# Patient Record
Sex: MALE | Race: WHITE | NOT HISPANIC OR LATINO | Employment: FULL TIME | URBAN - METROPOLITAN AREA
[De-identification: names, ages, dates, MRNs, and addresses within clinical notes are randomized per-mention and may not be internally consistent; named-entity substitution may affect disease eponyms.]

---

## 2018-01-26 ENCOUNTER — APPOINTMENT (EMERGENCY)
Dept: RADIOLOGY | Facility: HOSPITAL | Age: 58
End: 2018-01-26
Payer: COMMERCIAL

## 2018-01-26 ENCOUNTER — HOSPITAL ENCOUNTER (OUTPATIENT)
Facility: HOSPITAL | Age: 58
Setting detail: OBSERVATION
Discharge: LEFT BEFORE DISCHARGE | End: 2018-01-27
Attending: EMERGENCY MEDICINE | Admitting: HOSPITALIST
Payer: COMMERCIAL

## 2018-01-26 ENCOUNTER — APPOINTMENT (EMERGENCY)
Dept: CT IMAGING | Facility: HOSPITAL | Age: 58
End: 2018-01-26
Payer: COMMERCIAL

## 2018-01-26 DIAGNOSIS — R55 SYNCOPE: Primary | ICD-10-CM

## 2018-01-26 DIAGNOSIS — R55 SYNCOPE AND COLLAPSE: ICD-10-CM

## 2018-01-26 PROBLEM — Z72.0 TOBACCO ABUSE: Status: ACTIVE | Noted: 2018-01-26

## 2018-01-26 LAB
ALBUMIN SERPL BCP-MCNC: 4.2 G/DL (ref 3.5–5)
ALP SERPL-CCNC: 63 U/L (ref 46–116)
ALT SERPL W P-5'-P-CCNC: 25 U/L (ref 12–78)
ANION GAP SERPL CALCULATED.3IONS-SCNC: 8 MMOL/L (ref 4–13)
APAP SERPL-MCNC: <2 UG/ML (ref 10–30)
APTT PPP: 28 SECONDS (ref 23–35)
AST SERPL W P-5'-P-CCNC: 39 U/L (ref 5–45)
ATRIAL RATE: 88 BPM
BASOPHILS # BLD AUTO: 0.01 THOUSANDS/ΜL (ref 0–0.1)
BASOPHILS NFR BLD AUTO: 0 % (ref 0–1)
BILIRUB SERPL-MCNC: 0.8 MG/DL (ref 0.2–1)
BUN SERPL-MCNC: 14 MG/DL (ref 5–25)
CALCIUM SERPL-MCNC: 8.5 MG/DL (ref 8.3–10.1)
CHLORIDE SERPL-SCNC: 105 MMOL/L (ref 100–108)
CO2 SERPL-SCNC: 29 MMOL/L (ref 21–32)
CREAT SERPL-MCNC: 1 MG/DL (ref 0.6–1.3)
EOSINOPHIL # BLD AUTO: 0.03 THOUSAND/ΜL (ref 0–0.61)
EOSINOPHIL NFR BLD AUTO: 1 % (ref 0–6)
ERYTHROCYTE [DISTWIDTH] IN BLOOD BY AUTOMATED COUNT: 13.7 % (ref 11.6–15.1)
ETHANOL SERPL-MCNC: <3 MG/DL (ref 0–3)
GFR SERPL CREATININE-BSD FRML MDRD: 84 ML/MIN/1.73SQ M
GLUCOSE SERPL-MCNC: 100 MG/DL (ref 65–140)
HCT VFR BLD AUTO: 39.3 % (ref 36.5–49.3)
HGB BLD-MCNC: 14.2 G/DL (ref 12–17)
INR PPP: 1.17 (ref 0.86–1.16)
LYMPHOCYTES # BLD AUTO: 0.69 THOUSANDS/ΜL (ref 0.6–4.47)
LYMPHOCYTES NFR BLD AUTO: 11 % (ref 14–44)
MCH RBC QN AUTO: 31.3 PG (ref 26.8–34.3)
MCHC RBC AUTO-ENTMCNC: 36.1 G/DL (ref 31.4–37.4)
MCV RBC AUTO: 87 FL (ref 82–98)
MONOCYTES # BLD AUTO: 0.57 THOUSAND/ΜL (ref 0.17–1.22)
MONOCYTES NFR BLD AUTO: 9 % (ref 4–12)
NEUTROPHILS # BLD AUTO: 5.11 THOUSANDS/ΜL (ref 1.85–7.62)
NEUTS SEG NFR BLD AUTO: 79 % (ref 43–75)
P AXIS: 70 DEGREES
PLATELET # BLD AUTO: 165 THOUSANDS/UL (ref 149–390)
PMV BLD AUTO: 10.2 FL (ref 8.9–12.7)
POTASSIUM SERPL-SCNC: 3.8 MMOL/L (ref 3.5–5.3)
PR INTERVAL: 182 MS
PROLACTIN SERPL-MCNC: 51.9 NG/ML (ref 2.5–17.4)
PROT SERPL-MCNC: 7.7 G/DL (ref 6.4–8.2)
PROTHROMBIN TIME: 14.7 SECONDS (ref 12.1–14.4)
QRS AXIS: 67 DEGREES
QRSD INTERVAL: 100 MS
QT INTERVAL: 398 MS
QTC INTERVAL: 481 MS
RBC # BLD AUTO: 4.53 MILLION/UL (ref 3.88–5.62)
SALICYLATES SERPL-MCNC: 3.1 MG/DL (ref 3–20)
SODIUM SERPL-SCNC: 142 MMOL/L (ref 136–145)
T WAVE AXIS: 65 DEGREES
TROPONIN I SERPL-MCNC: <0.02 NG/ML
VENTRICULAR RATE: 88 BPM
WBC # BLD AUTO: 6.41 THOUSAND/UL (ref 4.31–10.16)

## 2018-01-26 PROCEDURE — 85025 COMPLETE CBC W/AUTO DIFF WBC: CPT | Performed by: EMERGENCY MEDICINE

## 2018-01-26 PROCEDURE — 84146 ASSAY OF PROLACTIN: CPT | Performed by: EMERGENCY MEDICINE

## 2018-01-26 PROCEDURE — 36415 COLL VENOUS BLD VENIPUNCTURE: CPT | Performed by: EMERGENCY MEDICINE

## 2018-01-26 PROCEDURE — 80053 COMPREHEN METABOLIC PANEL: CPT | Performed by: EMERGENCY MEDICINE

## 2018-01-26 PROCEDURE — 93010 ELECTROCARDIOGRAM REPORT: CPT | Performed by: INTERNAL MEDICINE

## 2018-01-26 PROCEDURE — 85610 PROTHROMBIN TIME: CPT | Performed by: EMERGENCY MEDICINE

## 2018-01-26 PROCEDURE — 85730 THROMBOPLASTIN TIME PARTIAL: CPT | Performed by: EMERGENCY MEDICINE

## 2018-01-26 PROCEDURE — 96360 HYDRATION IV INFUSION INIT: CPT

## 2018-01-26 PROCEDURE — 80320 DRUG SCREEN QUANTALCOHOLS: CPT | Performed by: EMERGENCY MEDICINE

## 2018-01-26 PROCEDURE — 71045 X-RAY EXAM CHEST 1 VIEW: CPT

## 2018-01-26 PROCEDURE — 70450 CT HEAD/BRAIN W/O DYE: CPT

## 2018-01-26 PROCEDURE — 80329 ANALGESICS NON-OPIOID 1 OR 2: CPT | Performed by: EMERGENCY MEDICINE

## 2018-01-26 PROCEDURE — 96361 HYDRATE IV INFUSION ADD-ON: CPT

## 2018-01-26 PROCEDURE — 84484 ASSAY OF TROPONIN QUANT: CPT | Performed by: EMERGENCY MEDICINE

## 2018-01-26 PROCEDURE — 99220 PR INITIAL OBSERVATION CARE/DAY 70 MINUTES: CPT | Performed by: HOSPITALIST

## 2018-01-26 PROCEDURE — 99285 EMERGENCY DEPT VISIT HI MDM: CPT

## 2018-01-26 PROCEDURE — 93005 ELECTROCARDIOGRAM TRACING: CPT

## 2018-01-26 PROCEDURE — 84484 ASSAY OF TROPONIN QUANT: CPT | Performed by: HOSPITALIST

## 2018-01-26 RX ORDER — THIAMINE MONONITRATE (VIT B1) 100 MG
100 TABLET ORAL ONCE
Status: COMPLETED | OUTPATIENT
Start: 2018-01-26 | End: 2018-01-26

## 2018-01-26 RX ORDER — NICOTINE 21 MG/24HR
1 PATCH, TRANSDERMAL 24 HOURS TRANSDERMAL DAILY
Status: DISCONTINUED | OUTPATIENT
Start: 2018-01-27 | End: 2018-01-27 | Stop reason: HOSPADM

## 2018-01-26 RX ORDER — LORAZEPAM 2 MG/ML
1 INJECTION INTRAMUSCULAR ONCE
Status: COMPLETED | OUTPATIENT
Start: 2018-01-26 | End: 2018-01-26

## 2018-01-26 RX ORDER — LORAZEPAM 2 MG/ML
1 INJECTION INTRAMUSCULAR EVERY 4 HOURS PRN
Status: DISCONTINUED | OUTPATIENT
Start: 2018-01-26 | End: 2018-01-27 | Stop reason: HOSPADM

## 2018-01-26 RX ADMIN — SODIUM CHLORIDE 1000 ML: 0.9 INJECTION, SOLUTION INTRAVENOUS at 15:00

## 2018-01-26 RX ADMIN — LORAZEPAM 1 MG: 2 INJECTION INTRAMUSCULAR; INTRAVENOUS at 17:41

## 2018-01-26 RX ADMIN — Medication 100 MG: at 17:38

## 2018-01-26 NOTE — H&P
H&P- Aimee Mcgrath 1960, 65 y o  male MRN: [id number]    Unit/Bed#: [address] [place] [address] Encounter: [id number]    Primary Care Provider: Celeste Patel MD   Date and time admitted to hospital: 1/26/2018  2:56 PM        Tobacco abuse   Assessment & Plan    -nicotine 14 mg patch  -patient counseled for 5 minutes on smoking cessation        * Syncope and collapse   Assessment & Plan    -monitor on telemetry  -trend troponin  -consult Cardiology  -check echocardiogram  -based on the history this does not look like a seizure but I will order an EEG          VTE Prophylaxis: Enoxaparin (Lovenox)  / sequential compression device   Code Status: FULL  POLST: POLST is not applicable to this patient      Anticipated Length of Stay:  Patient will be admitted on an Observation basis with an anticipated length of stay of  < 2 midnights  Justification for Hospital Stay: syncope    Total Time for Visit, including Counseling / Coordination of Care: 30 minutes  Greater than 50% of this total time spent on direct patient counseling and coordination of care  Chief Complaint:   Syncope and collapse    History of Present Illness:    Aimee Mcgrath is a 65 y o  male who presents with a syncopal episode earlier today  The patient was at work  He had no prodrome will symptoms  Patient passed out for approximately 5 minutes  Witnesses noted some twitching but no gross tonic-clonic activity  There was no bowel or bladder incontinence  Patient denies any chest pain or shortness of breath  He denies palpitations  He does note that last night after sleeping he woke up and had bitten his tongue  Patient denies cough, nausea, vomiting, diarrhea, abdominal pain, fevers, chills, night sweats, headache, visual disturbances, neck stiffness, new focal neurologic deficit, rash, dysuria, heat or cold intolerance, significant weight gain or loss        Review of Systems:    Review of Systems   All other systems reviewed and are negative  Past Medical and Surgical History:     Past Medical History:   Diagnosis Date    Cancer [facility])        Past Surgical History:   Procedure Laterality Date    RECTAL BIOPSY      RECTAL SURGERY         Meds/Allergies:    Prior to Admission medications    Not on File     I have reviewed home medications with patient personally  Allergies: No Known Allergies    Social History:     Marital Status:      Substance Use History:   History   Alcohol Use    Yes     Comment: states about 2 dozen between every saturday and sunday     History   Smoking Status    Current Every Day Smoker    Packs/day: 1 50   Smokeless Tobacco    Never Used     History   Drug Use No       Family History:    non-contributory    Physical Exam:     Vitals:   Blood Pressure: 140/76 (01/26/18 1800)  Pulse: 73 (01/26/18 1800)  Temperature: 99 6 °F (37 6 °C) (01/26/18 1505)  Temp Source: Oral (01/26/18 1505)  Respirations: 22 (01/26/18 1800)  Height: 5' 6" (167 6 cm) (01/26/18 1505)  Weight - Scale: 85 7 kg (189 lb) (01/26/18 1505)  SpO2: 100 % (01/26/18 1800)    Physical Exam    Gen: NAD, AAOx3, well developed, well nourished  Eyes: EOMI, PERRLA, no scleral icterus  ENMT:  Oropharynx clear of erythema or exudates, no nasal discharge, no otic discharge, moist mucous membranes  Neck:  Supple  Lymph:  No anterior or posterior cervical or supraclavicular lymphadenopathy  Cardiovascular:  Regular rate and rhythm, normal S1-S2, no murmurs, rubs, or gallops  Lungs:  Clear to auscultation bilaterally, no wheezes, or rales, or rhonchi  Abdomen:  Positive bowel sounds, soft, nontender, nondistended, no palpable organomegaly   Skin:  Intact, no obvious lesions or rashes, no edema  Neuro: Cranial nerves 2-12 are intact, non-focal, 5/5 strength in all 4 extremities      Additional Data:     Lab Results: I have personally reviewed pertinent reports          Results from last 7 days  Lab Units 01/26/18  1612   WBC Thousand/uL 6 41 HEMOGLOBIN g/dL 14 2   HEMATOCRIT % 39 3   PLATELETS Thousands/uL 165   NEUTROS PCT % 79*   LYMPHS PCT % 11*   MONOS PCT % 9   EOS PCT % 1       Results from last 7 days  Lab Units 01/26/18  1612   SODIUM mmol/L 142   POTASSIUM mmol/L 3 8   CHLORIDE mmol/L 105   CO2 mmol/L 29   BUN mg/dL 14   CREATININE mg/dL 1 00   CALCIUM mg/dL 8 5   TOTAL PROTEIN g/dL 7 7   BILIRUBIN TOTAL mg/dL 0 80   ALK PHOS U/L 63   ALT U/L 25   AST U/L 39   GLUCOSE RANDOM mg/dL 100       Results from last 7 days  Lab Units 01/26/18  1612   INR  1 17*       Imaging: I have personally reviewed pertinent reports  (read by me)    XR chest 1 view portable   Final Result by Lisseth Butterfield MD (01/26 1631)      No active pulmonary disease  Workstation performed: [id number]         CT head without contrast   Final Result by Lisseth Butterfield MD (01/26 1630)      No acute intracranial abnormality  Mild chronic small vessel ischemic changes  Workstation performed: [id number]             EKG, Pathology, and Other Studies Reviewed on Admission:   · EKG:  Read by me  Sinus rhythm at a rate of 88, normal axis and intervals, no acute ST or T-wave abnormalities  Allscripts / Epic Records Reviewed: Yes     ** Please Note: This note has been constructed using a voice recognition system   **

## 2018-01-26 NOTE — ED PROVIDER NOTES
History  Chief Complaint   Patient presents with    Syncope     Pt presents to ED via EMS with syncope  Pt passed out 40 mins ago at work and was seen "twitching " The episode lasted 5 mins  Pt is awake, alert, and oriented at this time  This is a 65year old man who presents for evaluation a syncopal episode that happened while he was at work today he does not remember the incident  There is no incontinence urine or stool he did wake and was oriented right after the episode  There was some generalize twitching of his body noted by bystanders without any definite seizure  He denies any headache chest pain shortness of breath or abdominal pain  Currently he is awake alert orient x3 with a [place] coma Scale of 15 and NIH stroke scale of 0  He does smoke denies any alcohol or drug use  He did bite his tongue last evening before this episode but does not remember how that happened  The episode today lasted for approximately 5 minutes  History provided by:  Patient and relative  Syncope   Episode history:  Single  Most recent episode: Today  Duration:  5 minutes  Chronicity:  New  Context comment:  Standing at work  Witnessed: yes    Associated symptoms: no confusion, no difficulty breathing, no headaches, no seizures and no shortness of breath        None       Past Medical History:   Diagnosis Date    Cancer [facility])        Past Surgical History:   Procedure Laterality Date    RECTAL BIOPSY      RECTAL SURGERY         History reviewed  No pertinent family history  I have reviewed and agree with the history as documented  Social History   Substance Use Topics    Smoking status: Current Every Day Smoker     Packs/day: 2 00    Smokeless tobacco: Never Used    Alcohol use Not on file        Review of Systems   Respiratory: Negative for shortness of breath  Cardiovascular: Positive for syncope  Neurological: Positive for syncope  Negative for seizures and headaches     Psychiatric/Behavioral: Negative for confusion  All other systems reviewed and are negative  Physical Exam  ED Triage Vitals   Temperature Pulse Respirations Blood Pressure SpO2   01/26/18 1505 01/26/18 1455 01/26/18 1500 01/26/18 1455 01/26/18 1500   99 6 °F (37 6 °C) 80 (!) 29 164/85 98 %      Temp Source Heart Rate Source Patient Position - Orthostatic VS BP Location FiO2 (%)   01/26/18 1505 01/26/18 1500 01/26/18 1455 01/26/18 1455 --   Oral Monitor Lying - Orthostatic VS Right arm       Pain Score       01/26/18 1505       No Pain           Orthostatic Vital Signs  Vitals:    01/26/18 1630 01/26/18 1645 01/26/18 1700 01/26/18 1715   BP: (!) 174/83 159/91 163/84    Pulse: 75 74 72 76   Patient Position - Orthostatic VS:  Sitting         Physical Exam   Constitutional: He is oriented to person, place, and time  He appears well-developed and well-nourished  No distress  HENT:   Head: Normocephalic and atraumatic  Right Ear: External ear normal    Left Ear: External ear normal    Bite boby to the left lateral side of his tongue   Eyes: Conjunctivae and EOM are normal  Pupils are equal, round, and reactive to light  No scleral icterus  Neck: Normal range of motion  Neck supple  No JVD present  No tracheal deviation present  Cardiovascular: Normal rate, regular rhythm and intact distal pulses  Exam reveals no gallop and no friction rub  No murmur heard  Pulmonary/Chest: Effort normal and breath sounds normal  No stridor  No respiratory distress  He has no wheezes  He has no rales  Abdominal: Soft  Bowel sounds are normal  He exhibits no distension  There is no tenderness  Musculoskeletal: Normal range of motion  He exhibits no edema, tenderness or deformity  Neurological: He is alert and oriented to person, place, and time  No cranial nerve deficit or sensory deficit  He exhibits normal muscle tone  Coordination normal    Skin: Skin is warm and dry  No rash noted  He is not diaphoretic     Psychiatric: He has a normal mood and affect  His behavior is normal  Thought content normal    Nursing note and vitals reviewed  ED Medications  Medications   thiamine (VITAMIN B1) tablet 100 mg (not administered)   LORazepam (ATIVAN) 2 mg/mL injection 1 mg (not administered)   sodium chloride 0 9 % bolus 1,000 mL (0 mL Intravenous Stopped 1/26/18 1700)       Diagnostic Studies  Results Reviewed     Procedure Component Value Units Date/Time    Acetaminophen level [[id number]]  (Abnormal) Collected:  01/26/18 1612    Lab Status:  Final result Specimen:  Blood from Arm, Left Updated:  01/26/18 1644     Acetaminophen Level <2 (L) ug/mL     Comprehensive metabolic panel [[id number]] Collected:  01/26/18 1612    Lab Status:  Final result Specimen:  Blood from Arm, Left Updated:  01/26/18 1643     Sodium 142 mmol/L      Potassium 3 8 mmol/L      Chloride 105 mmol/L      CO2 29 mmol/L      Anion Gap 8 mmol/L      BUN 14 mg/dL      Creatinine 1 00 mg/dL      Glucose 100 mg/dL      Calcium 8 5 mg/dL      AST 39 U/L      ALT 25 U/L      Alkaline Phosphatase 63 U/L      Total Protein 7 7 g/dL      Albumin 4 2 g/dL      Total Bilirubin 0 80 mg/dL      eGFR 84 ml/min/1 73sq m     Narrative:         National Kidney Disease Education Program recommendations are as follows:  GFR calculation is accurate only with a steady state creatinine  Chronic Kidney disease less than 60 ml/min/1 73 sq  meters  Kidney failure less than 15 ml/min/1 73 sq  meters      Salicylate level [[id number]]  (Normal) Collected:  01/26/18 1612    Lab Status:  Final result Specimen:  Blood from Arm, Left Updated:  40/05/57 5690     Salicylate Lvl 3 1 mg/dL     Troponin I [[id number]]  (Normal) Collected:  01/26/18 1612    Lab Status:  Final result Specimen:  Blood from Arm, Left Updated:  01/26/18 1641     Troponin I <0 02 ng/mL     Narrative:         [employer] Chemistry analyzer 99% cutoff is > 0 04 ng/mL in network labs    o cTnI 99% cutoff is useful only when applied to patients in the clinical setting of myocardial ischemia  o cTnI 99% cutoff should be interpreted in the context of clinical history, ECG findings and possibly cardiac imaging to establish correct diagnosis  o cTnI 99% cutoff may be suggestive but clearly not indicative of a coronary event without the clinical setting of myocardial ischemia  Protime-INR [[id number]]  (Abnormal) Collected:  01/26/18 1612    Lab Status:  Final result Specimen:  Blood from Arm, Left Updated:  01/26/18 1635     Protime 14 7 (H) seconds      INR 1 17 (H)    APTT [[id number]]  (Normal) Collected:  01/26/18 1612    Lab Status:  Final result Specimen:  Blood from Arm, Left Updated:  01/26/18 1635     PTT 28 seconds     Narrative: Therapeutic Heparin Range = 60-90 seconds    Ethanol [[id number]]  (Normal) Collected:  01/26/18 1612    Lab Status:  Final result Specimen:  Blood from Arm, Left Updated:  01/26/18 1634     Ethanol Lvl <3 mg/dL     CBC and differential [[id number]]  (Abnormal) Collected:  01/26/18 1612    Lab Status:  Final result Specimen:  Blood from Arm, Left Updated:  01/26/18 1624     WBC 6 41 Thousand/uL      RBC 4 53 Million/uL      Hemoglobin 14 2 g/dL      Hematocrit 39 3 %      MCV 87 fL      MCH 31 3 pg      MCHC 36 1 g/dL      RDW 13 7 %      MPV 10 2 fL      Platelets 650 Thousands/uL      Neutrophils Relative 79 (H) %      Lymphocytes Relative 11 (L) %      Monocytes Relative 9 %      Eosinophils Relative 1 %      Basophils Relative 0 %      Neutrophils Absolute 5 11 Thousands/µL      Lymphocytes Absolute 0 69 Thousands/µL      Monocytes Absolute 0 57 Thousand/µL      Eosinophils Absolute 0 03 Thousand/µL      Basophils Absolute 0 01 Thousands/µL     Prolactin [[id number]] Collected:  01/26/18 1612    Lab Status:   In process Specimen:  Blood from Arm, Left Updated:  01/26/18 1619    POCT urinalysis dipstick [[id number]]     Lab Status:  No result Specimen:  Urine     Rapid drug screen, urine [[id number]]     Lab Status:  No result Specimen:  Urine                  XR chest 1 view portable   Final Result by Janine Calderon MD (01/26 1631)      No active pulmonary disease  Workstation performed: [id number]         CT head without contrast   Final Result by Janine Calderon MD (01/26 1630)      No acute intracranial abnormality  Mild chronic small vessel ischemic changes  Workstation performed: [id number]                    Procedures  ECG 12 Lead Documentation  Date/Time: 1/26/2018 4:56 PM  Performed by: Isaak Sue  Authorized by: Isaak Sue     ECG reviewed by me, the ED Provider: yes    Patient location:  ED  Rhythm:     Rhythm: sinus rhythm    T waves:     T waves: normal    Other findings:     Other findings: prolonged qTc interval             Phone Contacts  ED Phone Contact    ED Course  ED Course                                MDM  Number of Diagnoses or Management Options  Diagnosis management comments:  Loss of consciousness differential includes syncopal episode versus seizure will check CT scan labs and EKG for further evaluation       Amount and/or Complexity of Data Reviewed  Clinical lab tests: ordered  Tests in the radiology section of CPT®: ordered      CritCare Time    Disposition  Final diagnoses:   Syncope - vasovagal versus seizure activity or alcohol withdrawal     Time reflects when diagnosis was documented in both MDM as applicable and the Disposition within this note     Time User Action Codes Description Comment    1/26/2018  5:29 PM Courtney Whittaker [R55] Syncope     1/26/2018  5:29 PM Rodger Molina [R55] Syncope vasovagal versus seizure activity or alcohol withdrawal      ED Disposition     ED Disposition Condition Comment    Admit  Case was discussed with *Dr Samuel Hill** and the patient's admission status was agreed to be Admission Status: observation status to the service of Dr Samuel Hill           Follow-up Information    None       Patient's Medications    No medications on file     No discharge procedures on file      ED Provider  Electronically Signed by           Jennifer Frias DO  01/26/18 6829

## 2018-01-26 NOTE — ED NOTES
Family at the bedside  Patient is awake and sitting up in bed  [place] provided and lights dimmed to promote comfort  No change in LOC or speech  Pt is cooperative and calm        Jack Gross RN  01/26/18 9801

## 2018-01-26 NOTE — ED NOTES
Pt appears dazed and agitated  Pt feels nauseous  Pt staring straight ahead and slow to respond  Pt took off blood pressure cuff  Family at the bedside  Discussed pt's condition with family       Panda Olivo RN  01/26/18 480 Yamilet Quezada,Chadwick Serra, RN  01/26/18 0414

## 2018-01-26 NOTE — ASSESSMENT & PLAN NOTE
-monitor on telemetry  -trend troponin  -consult Cardiology  -check echocardiogram  -based on the history this does not look like a seizure but I will order an EEG

## 2018-01-26 NOTE — ED NOTES
Spoke to pt about medications to be administered  Asked pt if he remembered feeling nauseous or taking off his BP cuff  Pt replied "I don't remember   I don't remember that at all"     Romi Harman RN  01/26/18 5871

## 2018-01-26 NOTE — ED NOTES
Family no longer at bedside  Pt is sleeping    Family left contacts    Daughter De[employer]: [phone]  Beena Forte [phone]     [facility]  01/26/18 2502

## 2018-01-27 VITALS
WEIGHT: 182 LBS | SYSTOLIC BLOOD PRESSURE: 166 MMHG | RESPIRATION RATE: 18 BRPM | OXYGEN SATURATION: 97 % | HEIGHT: 66 IN | TEMPERATURE: 98.1 F | BODY MASS INDEX: 29.25 KG/M2 | HEART RATE: 63 BPM | DIASTOLIC BLOOD PRESSURE: 91 MMHG

## 2018-01-27 LAB
AMPHETAMINES SERPL QL SCN: NEGATIVE
BARBITURATES UR QL: NEGATIVE
BENZODIAZ UR QL: NEGATIVE
COCAINE UR QL: NEGATIVE
METHADONE UR QL: NEGATIVE
OPIATES UR QL SCN: NEGATIVE
PCP UR QL: NEGATIVE
THC UR QL: POSITIVE
TROPONIN I SERPL-MCNC: <0.02 NG/ML

## 2018-01-27 PROCEDURE — 99244 OFF/OP CNSLTJ NEW/EST MOD 40: CPT | Performed by: INTERNAL MEDICINE

## 2018-01-27 PROCEDURE — 84484 ASSAY OF TROPONIN QUANT: CPT | Performed by: HOSPITALIST

## 2018-01-27 PROCEDURE — 99217 PR OBSERVATION CARE DISCHARGE MANAGEMENT: CPT | Performed by: INTERNAL MEDICINE

## 2018-01-27 PROCEDURE — 80307 DRUG TEST PRSMV CHEM ANLYZR: CPT | Performed by: EMERGENCY MEDICINE

## 2018-01-27 RX ORDER — NICOTINE 21 MG/24HR
1 PATCH, TRANSDERMAL 24 HOURS TRANSDERMAL DAILY
Qty: 28 PATCH | Refills: 0 | Status: SHIPPED | OUTPATIENT
Start: 2018-01-28

## 2018-01-27 RX ADMIN — ENOXAPARIN SODIUM 40 MG: 40 INJECTION SUBCUTANEOUS at 08:15

## 2018-01-27 RX ADMIN — Medication 400 MG: at 14:00

## 2018-01-27 NOTE — PROGRESS NOTES
Pt  Requesting to leave AMA  SLIM, Dr Dae Wagner notified of Pt  Wanting to leave AMA  Awaiting SLIM to talk to Pt  Pt  Was found earlier at the elevator approx  20 mins  Ago trying to leave  PCA & RN re-directed Pt  Back to room  Pt 's mother and friend also present in room and RN explained AMA process to all present  AMA paper to be signed by Pt  & WALESKA

## 2018-01-27 NOTE — PLAN OF CARE
Problem: Potential for Falls  Goal: Patient will remain free of falls  INTERVENTIONS:  - Assess patient frequently for physical needs  -  Identify cognitive and physical deficits and behaviors that affect risk of falls  -  [place] fall precautions as indicated by assessment   - Educate patient/family on patient safety including physical limitations  - Instruct patient to call for assistance with activity based on assessment  - Modify environment to reduce risk of injury  - Consider OT/PT consult to assist with strengthening/mobility   Outcome: Progressing      Problem: PAIN - ADULT  Goal: Verbalizes/displays adequate comfort level or baseline comfort level  Interventions:  - Encourage patient to monitor pain and request assistance  - Assess pain using appropriate pain scale  - Administer analgesics based on type and severity of pain and evaluate response  - Implement non-pharmacological measures as appropriate and evaluate response  - Consider cultural and social influences on pain and pain management  - Notify physician/advanced practitioner if interventions unsuccessful or patient reports new pain  Outcome: Progressing      Problem: SAFETY ADULT  Goal: Patient will remain free of falls  INTERVENTIONS:  - Assess patient frequently for physical needs  -  Identify cognitive and physical deficits and behaviors that affect risk of falls    -  [place] fall precautions as indicated by assessment   - Educate patient/family on patient safety including physical limitations  - Instruct patient to call for assistance with activity based on assessment  - Modify environment to reduce risk of injury  - Consider OT/PT consult to assist with strengthening/mobility   Outcome: Progressing      Problem: DISCHARGE PLANNING  Goal: Discharge to home or other facility with appropriate resources  INTERVENTIONS:  - Identify barriers to discharge w/patient and caregiver  - Arrange for needed discharge resources and transportation as appropriate  - Identify discharge learning needs (meds, wound care, etc )  - Arrange for interpretive services to assist at discharge as needed  - Refer to Case Management Department for coordinating discharge planning if the patient needs post-hospital services based on physician/advanced practitioner order or complex needs related to functional status, cognitive ability, or social support system  Outcome: Progressing      Problem: Knowledge Deficit  Goal: Patient/family/caregiver demonstrates understanding of disease process, treatment plan, medications, and discharge instructions  Complete learning assessment and assess knowledge base  Interventions:  - Provide teaching at level of understanding  - Provide teaching via preferred learning methods  Outcome: Progressing      Problem: NEUROSENSORY - ADULT  Goal: Achieves stable or improved neurological status  INTERVENTIONS  - Monitor and report changes in neurological status  - Initiate measures to prevent increased intracranial pressure  - Maintain blood pressure and fluid volume within ordered parameters to optimize cerebral perfusion  - Monitor temperature, glucose, and sodium or any other associated labs   Initiate appropriate interventions as ordered  - Monitor for seizure activity   - Administer anti-seizure medications as ordered  Outcome: Progressing    Goal: Absence of seizures  INTERVENTIONS  - Monitor for seizure activity  - Administer anti-seizure medications as ordered  - Monitor neurological status  Outcome: Progressing      Problem: CARDIOVASCULAR - ADULT  Goal: Maintains optimal cardiac output and hemodynamic stability  INTERVENTIONS:  - Monitor I/O, vital signs and rhythm  - Monitor for S/S and trends of decreased cardiac output i e  bleeding, hypotension  - Administer and titrate ordered vasoactive medications to optimize hemodynamic stability  - Assess quality of pulses, skin color and temperature  - Assess for signs of decreased coronary artery perfusion - ex   Angina  - Instruct patient to report change in severity of symptoms  Outcome: Progressing    Goal: Absence of cardiac dysrhythmias or at baseline rhythm  INTERVENTIONS:  - Continuous cardiac monitoring, monitor vital signs, obtain 12 lead EKG if indicated  - Administer antiarrhythmic and heart rate control medications as ordered  - Monitor electrolytes and administer replacement therapy as ordered  Outcome: Progressing      Problem: SKIN/TISSUE INTEGRITY - ADULT  Goal: Skin integrity remains intact  INTERVENTIONS  - Identify patients at risk for skin breakdown  - Assess and monitor skin integrity  - Assess and monitor nutrition and hydration status  - Monitor labs (i e  albumin)  - Assess for incontinence   - Turn and reposition patient  - Assist with mobility/ambulation  - Relieve pressure over bony prominences  - Avoid friction and shearing  - Provide appropriate hygiene as needed including keeping skin clean and dry  - Evaluate need for skin moisturizer/barrier cream  - Collaborate with interdisciplinary team (i e  Nutrition, Rehabilitation, etc )   - Patient/family teaching  Outcome: Progressing      Problem: MUSCULOSKELETAL - ADULT  Goal: Maintain or return mobility to safest level of function  INTERVENTIONS:  - Assess patient's ability to carry out ADLs; assess patient's baseline for ADL function and identify physical deficits which impact ability to perform ADLs (bathing, care of mouth/teeth, toileting, grooming, dressing, etc )  - Assess/evaluate cause of self-care deficits   - Assess range of motion  - Assess patient's mobility; develop plan if impaired  - Assess patient's need for assistive devices and provide as appropriate  - Encourage maximum independence but intervene and supervise when necessary  - Involve family in performance of ADLs  - Assess for home care needs following discharge   - Request OT consult to assist with ADL evaluation and planning for discharge  - Provide patient education as appropriate  Outcome: Progressing      Problem: SUBSTANCE USE/ABUSE  Goal: By discharge, will develop insight into their chemical dependency and sustain motivation to continue in recovery  INTERVENTIONS:  - Attends all daily group sessions and scheduled AA groups  - Actively practices coping skills through participation in the therapeutic community and adherence to program rules  - Reviews and completes assignments from individual treatment plan  - Assist patient development of understanding of their personal cycle of addiction and relapse triggers  Outcome: Progressing    Goal: By discharge, patient will have ongoing treatment plan addressing chemical dependency  INTERVENTIONS:  - Assist patient with resources and/or appointments for ongoing recovery based living  Outcome: Progressing      Problem: SUBSTANCE USE/ABUSE  Goal: Will have no detox symptoms and will verbalize plan for changing substance-related behavior  INTERVENTIONS:  - Monitor physical status and assess for symptoms of withdrawal  - Administer medication as ordered  - Provide emotional support with 1 on 1 interaction with staff  - Encourage recovery focused program/ addiction education  - Assess for verbalization of changing behaviors related to substance abuse  - Initiate consults and referrals as appropriate (Case Management, Spiritual Care, etc )  Outcome: Progressing

## 2018-01-27 NOTE — CONSULTS
Consultation - Cardiology   Kristin Resendez 65 y o  male MRN: [id number]  Unit/Bed#: [address] [place] [address] [id number] Encounter: [id number]    Assessment/Plan     Assessment:  Syncope      Plan:    Sudden onset syncope with no prodrome  No prior cardiac history and no family history of sudden cardiac death  Recommend echocardiogram  He does have prolong QTc on his EKG today  Repeat EKG tomorrow as well  Further recommendations after echocardiogram      History of Present Illness   Physician Requesting Consult: Ping Brennan MD  Reason for Consult / Principal Problem: Syncope  HPI: Kristin Resendez is a 65y o  year old male who presents with sudden syncope  He was at work and passed out suddenly  No prodrome  He was feeling like his usual self prior to this episode  He was brought to the ER and subsequently admitted  He has been in sinus rhythm on telemetry  He has no current chest pain, no dyspnea, no palpitations  He has no prior history of CAD, CHF, or prior arrhythmia  Consults    Review of Systems   Constitutional: Negative  HENT: Negative  Eyes: Negative  Respiratory: Negative  Cardiovascular: Negative  Gastrointestinal: Negative  Endocrine: Negative  Genitourinary: Negative  Musculoskeletal: Negative  Skin: Negative  Allergic/Immunologic: Negative  Neurological: Positive for syncope  Hematological: Negative  Psychiatric/Behavioral: Negative  Historical Information   Past Medical History:   Diagnosis Date    Cancer [facility])      Past Surgical History:   Procedure Laterality Date    RECTAL BIOPSY      RECTAL SURGERY       History   Alcohol Use    Yes     Comment: states about 2 dozen between every saturday and sunday     History   Drug Use No     History   Smoking Status    Current Every Day Smoker    Packs/day: 1 50   Smokeless Tobacco    Never Used     Family History: History reviewed  No pertinent family history      Meds/Allergies   current meds:   Current Facility-Administered Medications   Medication Dose Route Frequency    enoxaparin (LOVENOX) subcutaneous injection 40 mg  40 mg Subcutaneous Daily    influenza inactivated quadrivalent vaccine (FLULAVAL) IM injection 0 5 mL  0 5 mL Intramuscular Prior to discharge    LORazepam (ATIVAN) 2 mg/mL injection 1 mg  1 mg Intravenous Q4H PRN    nicotine (NICODERM CQ) 14 mg/24hr TD 24 hr patch 1 patch  1 patch Transdermal Daily     No Known Allergies    Objective   Vitals: Blood pressure 166/91, pulse 63, temperature 98 1 °F (36 7 °C), temperature source Oral, resp  rate 18, height 5' 6" (1 676 m), weight 82 6 kg (182 lb), SpO2 97 %  Orthostatic Blood Pressures    Flowsheet Row Most Recent Value   Blood Pressure  166/91 filed at 01/27/2018 0725   Patient Position - Orthostatic VS  Lying filed at 01/27/2018 0725            Intake/Output Summary (Last 24 hours) at 01/27/18 1335  Last data filed at 01/26/18 1700   Gross per 24 hour   Intake             2000 ml   Output                0 ml   Net             2000 ml       Invasive Devices     Peripheral Intravenous Line            Peripheral IV 01/27/18 Right;Upper Forearm less than 1 day                Physical Exam   Constitutional: He is oriented to person, place, and time  No distress  HENT:   Mouth/Throat: No oropharyngeal exudate  Neck: No JVD present  Cardiovascular: Normal rate and regular rhythm  No murmur heard  Pulmonary/Chest: Effort normal and breath sounds normal  No respiratory distress  He has no wheezes  He has no rales  Abdominal: Soft  Bowel sounds are normal  He exhibits no distension  There is no tenderness  There is no rebound  Musculoskeletal: He exhibits no edema  Neurological: He is alert and oriented to person, place, and time  Skin: Skin is warm  He is not diaphoretic  Psychiatric: He has a normal mood and affect  His behavior is normal        Lab Results:   I have personally reviewed pertinent lab results      CBC with diff: Results from last 7 days  Lab Units 01/26/18  1612   WBC Thousand/uL 6 41   RBC Million/uL 4 53   HEMOGLOBIN g/dL 14 2   HEMATOCRIT % 39 3   MCV fL 87   MCH pg 31 3   MCHC g/dL 36 1   RDW % 13 7   MPV fL 10 2   PLATELETS Thousands/uL 165     CMP:   Results from last 7 days  Lab Units 01/26/18  1612   SODIUM mmol/L 142   POTASSIUM mmol/L 3 8   CHLORIDE mmol/L 105   CO2 mmol/L 29   ANION GAP mmol/L 8   BUN mg/dL 14   CREATININE mg/dL 1 00   GLUCOSE RANDOM mg/dL 100   CALCIUM mg/dL 8 5   AST U/L 39   ALT U/L 25   ALK PHOS U/L 63   TOTAL PROTEIN g/dL 7 7   BILIRUBIN TOTAL mg/dL 0 80   EGFR ml/min/1 73sq m 84     Troponin:   0  Lab Value Date/Time   TROPONINI <0 02 01/27/2018 0018   TROPONINI <0 02 01/26/2018 2202   TROPONINI <0 02 01/26/2018 1853   TROPONINI <0 02 01/26/2018 1612     BNP:   Results from last 7 days  Lab Units 01/26/18  1612   SODIUM mmol/L 142   POTASSIUM mmol/L 3 8   CHLORIDE mmol/L 105   CO2 mmol/L 29   ANION GAP mmol/L 8   BUN mg/dL 14   CREATININE mg/dL 1 00   GLUCOSE RANDOM mg/dL 100   CALCIUM mg/dL 8 5   EGFR ml/min/1 73sq m 84     Coags:   Results from last 7 days  Lab Units 01/26/18  1612   PTT seconds 28   INR  1 17*     TSH:     Magnesium:     Lipid Profile:     Imaging: I have personally reviewed pertinent films in PACS  EKG: Normal Sinus Rhythm  Prolong QTc  Code Status: Level 1 - Full Code  Advance Directive and Living Will:      Power of :    POLST:      Counseling / Coordination of Care  Total floor / unit time spent today 45 minutes  Greater than 50% of total time was spent with the patient and / or family counseling and / or coordination of care  A description of the counseling / coordination of care

## 2018-01-27 NOTE — CASE MANAGEMENT
Initial Clinical Review    Admission: Date/Time/Statement: Observation 1/26 @ 1732    Orders Placed This Encounter   Procedures    Place in Observation (expected length of stay for this patient is less than two midnights)     Standing Status:   Standing     Number of Occurrences:   1     Order Specific Question:   Admitting Physician     Answer:   Brant Garcia [[id number]]     Order Specific Question:   Level of Care     Answer:   Med Surg [16]       ED: Date/Time/Mode of Arrival:   ED Arrival Information     Expected Arrival Acuity Means of Arrival Escorted By Service Admission Type    - 1/26/2018 14:53 [facility]ie Deutsch) General Medicine Urgent    Arrival Complaint    POSSIBLE SEIZURE          Chief Complaint:   Chief Complaint   Patient presents with    Syncope     Pt presents to ED via EMS with syncope  Pt passed out 40 mins ago at work and was seen "twitching " The episode lasted 5 mins  Pt is awake, alert, and oriented at this time  History of Illness: 65 y o  male who presents with a syncopal episode earlier today  The patient was at work  He had no prodrome will symptoms  Patient passed out for approximately 5 minutes  Witnesses noted some twitching but no gross tonic-clonic activity  There was no bowel or bladder incontinence  Patient denies any chest pain or shortness of breath  He denies palpitations  He does note that last night after sleeping he woke up and had bitten his tongue  Patient denies cough, nausea, vomiting, diarrhea, abdominal pain, fevers, chills, night sweats, headache, visual disturbances, neck stiffness, new focal neurologic deficit, rash, dysuria, heat or cold intolerance, significant weight gain or loss      ED Vital Signs:   ED Triage Vitals   Temperature Pulse Respirations Blood Pressure SpO2   01/26/18 1505 01/26/18 1455 01/26/18 1500 01/26/18 1455 01/26/18 1500   99 6 °F (37 6 °C) 80 (!) 29 164/85 98 %      Temp Source Heart Rate Source Patient Position - Orthostatic VS BP Location FiO2 (%)   01/26/18 1505 01/26/18 1500 01/26/18 1455 01/26/18 1455 --   Oral Monitor Lying - Orthostatic VS Right arm       Pain Score       01/26/18 1505       No Pain        Wt Readings from Last 1 Encounters:   01/26/18 82 6 kg (182 lb)       Vital Signs (abnormal):   Date/Time  Temp  Pulse  Resp  BP  SpO2  O2 Device  Patient Position - Orthostatic VS     01/26/18 1745  --  74   26  160/91  95 %  --  --   01/26/18 1730  --  77   26  162/86  96 %  --  --     Abnormal Labs:   01/27/18 0018     THC Urine Negative  Positive         Diagnostic Test Results: PCXR - No active pulmonary disease  CT Head - No acute intracranial abnormality      Mild chronic small vessel ischemic changes      ED Treatment:   Medication Administration from 01/26/2018 1453 to 01/26/2018 1822       Date/Time Order Dose Route Action     01/26/2018 1500 sodium chloride 0 9 % bolus 1,000 mL 1,000 mL Intravenous New Bag     01/26/2018 1738 thiamine (VITAMIN B1) tablet 100 mg 100 mg Oral Given     01/26/2018 1741 LORazepam (ATIVAN) 2 mg/mL injection 1 mg 1 mg Intravenous Given          Past Medical/Surgical History:    Active Ambulatory Problems     Diagnosis Date Noted    No Active Ambulatory Problems     Resolved Ambulatory Problems     Diagnosis Date Noted    No Resolved Ambulatory Problems     Past Medical History:   Diagnosis Date    Cancer [facility])        Admitting Diagnosis: Syncope [R55]  Seizure disorder ([facility] [address] ) [G40 909]    Age/Sex: 65 y o  male    Assessment/Plan:   Tobacco abuse   Assessment & Plan     -nicotine 14 mg patch  -patient counseled for 5 minutes on smoking cessation          * Syncope and collapse   Assessment & Plan     -monitor on telemetry  -trend troponin  -consult Cardiology  -check echocardiogram  -based on the history this does not look like a seizure but I will order an EEG             VTE Prophylaxis: Enoxaparin (Lovenox)  / sequential compression device   Code Status: FULL  POLST: POLST is not applicable to this patient      Anticipated Length of Stay:  Patient will be admitted on an Observation basis with an anticipated length of stay of  < 2 midnights     Justification for Hospital Stay: syncope      Admission Orders:  Tele monitoring  Echo  Neurological checks q4h x24h  Cardiology cons    Scheduled Meds:   enoxaparin 40 mg Subcutaneous Daily   nicotine 1 patch Transdermal Daily     Continuous Infusions:    PRN Meds: influenza vaccine    LORazepam

## 2018-01-27 NOTE — PROGRESS NOTES
AMA paper signed with Pt  & SLIM  IV and telemetry removed  Pt  Escorted to elevator with Pt 's mom & friend

## 2018-01-27 NOTE — DISCHARGE SUMMARY
Discharge Summary - Violeta Goodwin 65 y o  male MRN: [id number]  Unit/Bed#: [address] [place] [address] Encounter: [id number]    Admission Date:    1/26/2018   Discharge Date:   01/27/18   Admitting Diagnosis:   Syncope [R55]  Seizure disorder [facility]) [R88 021]  Admitting Provider:   Fidencio Londono MD  Discharge Provider:   Mary Costello MD     Primary Care Physician at Discharge:   Georganna Aschoff, ,[phone]    HPI:   76-year-old male who presented to the emergency department with syncopal episode  Patient was at work and was witnessed to have some twitching  For a detailed HPI please refer to the admission note  Procedures Performed:   Orders Placed This Encounter   Procedures    ED ECG Documentation Only       Hospital Course:   Patient was admitted to med surgical floor on telemetry with syncope under observation status  Patient had serial troponin done which were all negative  Patient blood work was unremarkable  Patient's CT head was also unremarkable  He had echocardiogram and EEG ordered  Patient was seen by Cardiology who recommended that patient needs repeat EKG in a m  He recommended echocardiogram   He started the patient on magnesium  Smoking cessation counseling was given and patient was started on nicotine patch  Patient called RN and stated that he wants to go home  I discussed at length with patient but he is insistent on signing out against medical advice  He was explained at length the risk of signing against medical advice including further seizure episode, arrhythmias, respiratory failure and not possible death  Patient is oriented x3 and verbalized the understanding of the instructions and still signed against medical advice  Patient was given prescription for magnesium and nicotine  He is advised at length to-  Follow-up with PCP ASAP  Follow-up with Neurology as outpatient  Outpatient EEG    Patient needs to get an echocardiogram and follow up with cardiologist ASAP   Return to ER with any alarming symptoms    Consulting Providers   Cardiology    Complications:  None    Labs:   Lab Results   Component Value Date    WBC 6 41 01/26/2018    RBC 4 53 01/26/2018    HGB 14 2 01/26/2018    HCT 39 3 01/26/2018    MCV 87 01/26/2018    MCH 31 3 01/26/2018    RDW 13 7 01/26/2018     01/26/2018     Lab Results   Component Value Date    CREATININE 1 00 01/26/2018    BUN 14 01/26/2018     01/26/2018    K 3 8 01/26/2018     01/26/2018    CO2 29 01/26/2018    GLUCOSE 100 01/26/2018    PROT 7 7 01/26/2018    ALKPHOS 63 01/26/2018    ALT 25 01/26/2018    AST 39 01/26/2018       Treatments:  Nicotine, magnesium oxide, Lovenox, vitamin B1    Discharge Diagnosis:   Principal Problem:    Syncope and collapse  Active Problems:    Tobacco abuse  Resolved Problems:    * No resolved hospital problems  *      Condition at Discharge:   Good     Code Status: Level 1 - Full Code  Advance Directive and Living Will: <no information>  Power of :    POLST:      Discharge instructions/Information to patient and family:   See after visit summary for information provided to patient and family  Provisions for Follow-Up Care:  See after visit summary for information related to follow-up care and any pertinent home health orders  Disposition:   Left against medical advice    Planned Readmission:   No    Discharge Statement   I spent 35 minutes discharging the patient  This time was spent on the day of discharge  I had direct contact with the patient on the day of discharge  Greater than 50% of the total time was spent examining patient, answering all patient questions, arranging and discussing plan of care with patient as well as directly providing post-discharge instructions  Additional time then spent on discharge activities  Discharge Medications:  See after visit summary for reconciled discharge medications provided to patient and family        "This note has been constructed using a voice recognition system"    Leslie Mccarthy MD  1/27/2018,3:28 PM

## 2018-01-27 NOTE — PROGRESS NOTES
Progress Note - Emeka Bowers 65 y o  male MRN: [id number]    Unit/Bed#: [address] [place] [address]02 Encounter: [id number]      Assessment:  syncope    Plan:  Syncope and collapse  Assessment & Plan     -monitor on telemetry  -troponin x3 negative  -awaiting cardiology consult  -awaiting echo echocardiogram  -awaiting EEG  If all negative , possible discharge tomorrow       Tobacco abuse  Assessment & Plan     -nicotine 14 mg patch  -patient counseled for 5 minutes on smoking cessation        dispo --  Possible discharge tomorrow with cardiology consult completed and echo done    Subjective:   Comfortable , denies any complaints    Objective:     Vitals: Blood pressure 166/91, pulse 63, temperature 98 1 °F (36 7 °C), temperature source Oral, resp  rate 18, height 5' 6" (1 676 m), weight 82 6 kg (182 lb), SpO2 97 %  ,Body mass index is 29 38 kg/m²        Intake/Output Summary (Last 24 hours) at 01/27/18 1208  Last data filed at 01/26/18 1700   Gross per 24 hour   Intake             2000 ml   Output                0 ml   Net             2000 ml       Physical Exam: /91 (BP Location: Right arm)   Pulse 63   Temp 98 1 °F (36 7 °C) (Oral)   Resp 18   Ht 5' 6" (1 676 m)   Wt 82 6 kg (182 lb)   SpO2 97%   BMI 29 38 kg/m²     General Appearance:    Alert, cooperative, no distress, appears stated age   Head:    Normocephalic, without obvious abnormality, atraumatic   Eyes:    PERRL, conjunctiva/corneas clear, EOM's intact, fundi     benign, both eyes        Ears:    Normal TM's and external ear canals, both ears   Nose:   Nares normal, septum midline, mucosa normal, no drainage    or sinus tenderness   Throat:   Lips, mucosa, and tongue normal; teeth and gums normal   Neck:   Supple, symmetrical, trachea midline, no adenopathy;        thyroid:  No enlargement/tenderness/nodules; no carotid    bruit or JVD   Back:     Symmetric, no curvature, ROM normal, no CVA tenderness   Lungs:     Clear to auscultation bilaterally, respirations unlabored   Chest wall:    No tenderness or deformity   Heart:    Regular rate and rhythm, S1 and S2 normal, no murmur, rub   or gallop   Abdomen:     Soft, non-tender, bowel sounds active all four quadrants,     no masses, no organomegaly   Genitalia:    Normal male without lesion, discharge or tenderness   Rectal:    Normal tone, normal prostate, no masses or tenderness;    guaiac negative stool   Extremities:   Extremities normal, atraumatic, no cyanosis or edema   Pulses:   2+ and symmetric all extremities   Skin:   Skin color, texture, turgor normal, no rashes or lesions   Lymph nodes:   Cervical, supraclavicular, and axillary nodes normal   Neurologic:   CNII-XII intact  Normal strength, sensation and reflexes       throughout        Invasive Devices     Peripheral Intravenous Line            Peripheral IV 01/27/18 Right;Upper Forearm less than 1 day                Lab, Imaging and other studies: I have personally reviewed pertinent reports      VTE Pharmacologic Prophylaxis: Enoxaparin (Lovenox)  VTE Mechanical Prophylaxis: sequential compression device

## 2020-01-06 ENCOUNTER — TELEPHONE (OUTPATIENT)
Dept: GASTROENTEROLOGY | Facility: CLINIC | Age: 60
End: 2020-01-06

## 2020-01-06 NOTE — TELEPHONE ENCOUNTER
Tried calling listed numbers-home number listed in [facility] disconnected and cell number listed in Epic/[employer] goes to a business   Was unable to reach pt

## 2020-01-08 NOTE — TELEPHONE ENCOUNTER
Called pt's listed pcp, only able to give an alternate work number ([phone]) for pt  Tried calling and line never rang, just dead air  Dr Tarik Rogers, unable to reach pt by phone, ok to send final letter?

## 2021-03-22 NOTE — DISCHARGE INSTRUCTIONS
Follow-up with PCP ASAP  Follow-up with Neurology as outpatient  Outpatient EEG  Patient needs to get an echocardiogram and follow up with cardiologist ASAP    Return to ER with any alarming symptoms Where Do You Want The Question To Include Opioid Counseling Located?: Case Summary Tab

## 2024-10-29 ENCOUNTER — APPOINTMENT (EMERGENCY)
Dept: CT IMAGING | Facility: HOSPITAL | Age: 64
End: 2024-10-29
Payer: COMMERCIAL

## 2024-10-29 ENCOUNTER — APPOINTMENT (EMERGENCY)
Dept: RADIOLOGY | Facility: HOSPITAL | Age: 64
End: 2024-10-29
Payer: COMMERCIAL

## 2024-10-29 ENCOUNTER — HOSPITAL ENCOUNTER (EMERGENCY)
Facility: HOSPITAL | Age: 64
End: 2024-10-29
Attending: EMERGENCY MEDICINE | Admitting: EMERGENCY MEDICINE
Payer: COMMERCIAL

## 2024-10-29 ENCOUNTER — HOSPITAL ENCOUNTER (INPATIENT)
Facility: HOSPITAL | Age: 64
LOS: 8 days | DRG: 064 | End: 2024-11-06
Attending: ANESTHESIOLOGY | Admitting: ANESTHESIOLOGY
Payer: COMMERCIAL

## 2024-10-29 VITALS
OXYGEN SATURATION: 93 % | RESPIRATION RATE: 17 BRPM | DIASTOLIC BLOOD PRESSURE: 58 MMHG | TEMPERATURE: 99.1 F | SYSTOLIC BLOOD PRESSURE: 111 MMHG | BODY MASS INDEX: 32.53 KG/M2 | HEART RATE: 72 BPM | HEIGHT: 66 IN | WEIGHT: 202.38 LBS

## 2024-10-29 DIAGNOSIS — I63.9 CVA (CEREBRAL VASCULAR ACCIDENT) (HCC): ICD-10-CM

## 2024-10-29 DIAGNOSIS — I71.9 PENETRATING ATHEROSCLEROTIC ULCER OF AORTA (HCC): Primary | ICD-10-CM

## 2024-10-29 DIAGNOSIS — N17.9 AKI (ACUTE KIDNEY INJURY) (HCC): ICD-10-CM

## 2024-10-29 DIAGNOSIS — F10.10 ALCOHOL ABUSE: ICD-10-CM

## 2024-10-29 DIAGNOSIS — R29.898 WEAKNESS OF RIGHT LOWER EXTREMITY: Primary | ICD-10-CM

## 2024-10-29 DIAGNOSIS — I71.9 PENETRATING ATHEROSCLEROTIC ULCER OF AORTA (HCC): ICD-10-CM

## 2024-10-29 DIAGNOSIS — S27.892A MEDIASTINAL HEMATOMA, INITIAL ENCOUNTER: ICD-10-CM

## 2024-10-29 DIAGNOSIS — I10 HTN (HYPERTENSION): ICD-10-CM

## 2024-10-29 DIAGNOSIS — R91.1 PULMONARY NODULE: ICD-10-CM

## 2024-10-29 LAB
2HR DELTA HS TROPONIN: 1 NG/L
4HR DELTA HS TROPONIN: -1 NG/L
ALBUMIN SERPL BCG-MCNC: 4.3 G/DL (ref 3.5–5)
ALP SERPL-CCNC: 52 U/L (ref 34–104)
ALT SERPL W P-5'-P-CCNC: 21 U/L (ref 7–52)
ANION GAP SERPL CALCULATED.3IONS-SCNC: 10 MMOL/L (ref 4–13)
APTT PPP: 26 SECONDS (ref 23–34)
AST SERPL W P-5'-P-CCNC: 27 U/L (ref 13–39)
ATRIAL RATE: 68 BPM
ATRIAL RATE: 69 BPM
ATRIAL RATE: 77 BPM
BILIRUB SERPL-MCNC: 1.17 MG/DL (ref 0.2–1)
BUN SERPL-MCNC: 18 MG/DL (ref 5–25)
CALCIUM SERPL-MCNC: 9.1 MG/DL (ref 8.4–10.2)
CARDIAC TROPONIN I PNL SERPL HS: 10 NG/L
CARDIAC TROPONIN I PNL SERPL HS: 11 NG/L
CARDIAC TROPONIN I PNL SERPL HS: 12 NG/L
CHLORIDE SERPL-SCNC: 102 MMOL/L (ref 96–108)
CO2 SERPL-SCNC: 24 MMOL/L (ref 21–32)
CREAT SERPL-MCNC: 1.59 MG/DL (ref 0.6–1.3)
ERYTHROCYTE [DISTWIDTH] IN BLOOD BY AUTOMATED COUNT: 13 % (ref 11.6–15.1)
GFR SERPL CREATININE-BSD FRML MDRD: 45 ML/MIN/1.73SQ M
GLUCOSE SERPL-MCNC: 130 MG/DL (ref 65–140)
GLUCOSE SERPL-MCNC: 132 MG/DL (ref 65–140)
HCT VFR BLD AUTO: 41.2 % (ref 36.5–49.3)
HGB BLD-MCNC: 14.1 G/DL (ref 12–17)
INR PPP: 1.2 (ref 0.85–1.19)
MCH RBC QN AUTO: 29.1 PG (ref 26.8–34.3)
MCHC RBC AUTO-ENTMCNC: 34.2 G/DL (ref 31.4–37.4)
MCV RBC AUTO: 85 FL (ref 82–98)
P AXIS: 69 DEGREES
P AXIS: 70 DEGREES
P AXIS: 70 DEGREES
PLATELET # BLD AUTO: 207 THOUSANDS/UL (ref 149–390)
PMV BLD AUTO: 9.7 FL (ref 8.9–12.7)
POTASSIUM SERPL-SCNC: 4.1 MMOL/L (ref 3.5–5.3)
PR INTERVAL: 204 MS
PR INTERVAL: 206 MS
PR INTERVAL: 212 MS
PROT SERPL-MCNC: 7.3 G/DL (ref 6.4–8.4)
PROTHROMBIN TIME: 15.7 SECONDS (ref 12.3–15)
QRS AXIS: 60 DEGREES
QRS AXIS: 62 DEGREES
QRS AXIS: 67 DEGREES
QRSD INTERVAL: 90 MS
QRSD INTERVAL: 92 MS
QRSD INTERVAL: 98 MS
QT INTERVAL: 384 MS
QT INTERVAL: 430 MS
QT INTERVAL: 440 MS
QTC INTERVAL: 434 MS
QTC INTERVAL: 457 MS
QTC INTERVAL: 471 MS
RBC # BLD AUTO: 4.84 MILLION/UL (ref 3.88–5.62)
SODIUM SERPL-SCNC: 136 MMOL/L (ref 135–147)
T WAVE AXIS: 247 DEGREES
T WAVE AXIS: 252 DEGREES
T WAVE AXIS: 263 DEGREES
VENTRICULAR RATE: 68 BPM
VENTRICULAR RATE: 69 BPM
VENTRICULAR RATE: 77 BPM
WBC # BLD AUTO: 5.12 THOUSAND/UL (ref 4.31–10.16)

## 2024-10-29 PROCEDURE — 70496 CT ANGIOGRAPHY HEAD: CPT

## 2024-10-29 PROCEDURE — 71275 CT ANGIOGRAPHY CHEST: CPT

## 2024-10-29 PROCEDURE — 72128 CT CHEST SPINE W/O DYE: CPT

## 2024-10-29 PROCEDURE — 80053 COMPREHEN METABOLIC PANEL: CPT | Performed by: EMERGENCY MEDICINE

## 2024-10-29 PROCEDURE — 36415 COLL VENOUS BLD VENIPUNCTURE: CPT | Performed by: EMERGENCY MEDICINE

## 2024-10-29 PROCEDURE — 96375 TX/PRO/DX INJ NEW DRUG ADDON: CPT

## 2024-10-29 PROCEDURE — 82948 REAGENT STRIP/BLOOD GLUCOSE: CPT

## 2024-10-29 PROCEDURE — 96365 THER/PROPH/DIAG IV INF INIT: CPT

## 2024-10-29 PROCEDURE — 99245 OFF/OP CONSLTJ NEW/EST HI 55: CPT

## 2024-10-29 PROCEDURE — 93010 ELECTROCARDIOGRAM REPORT: CPT | Performed by: INTERNAL MEDICINE

## 2024-10-29 PROCEDURE — 85027 COMPLETE CBC AUTOMATED: CPT | Performed by: EMERGENCY MEDICINE

## 2024-10-29 PROCEDURE — 93005 ELECTROCARDIOGRAM TRACING: CPT

## 2024-10-29 PROCEDURE — 70498 CT ANGIOGRAPHY NECK: CPT

## 2024-10-29 PROCEDURE — 99291 CRITICAL CARE FIRST HOUR: CPT | Performed by: EMERGENCY MEDICINE

## 2024-10-29 PROCEDURE — 99223 1ST HOSP IP/OBS HIGH 75: CPT | Performed by: PHYSICIAN ASSISTANT

## 2024-10-29 PROCEDURE — 96366 THER/PROPH/DIAG IV INF ADDON: CPT

## 2024-10-29 PROCEDURE — 85610 PROTHROMBIN TIME: CPT | Performed by: EMERGENCY MEDICINE

## 2024-10-29 PROCEDURE — 72131 CT LUMBAR SPINE W/O DYE: CPT

## 2024-10-29 PROCEDURE — 71045 X-RAY EXAM CHEST 1 VIEW: CPT

## 2024-10-29 PROCEDURE — 84484 ASSAY OF TROPONIN QUANT: CPT | Performed by: EMERGENCY MEDICINE

## 2024-10-29 PROCEDURE — 74174 CTA ABD&PLVS W/CONTRAST: CPT

## 2024-10-29 PROCEDURE — 85730 THROMBOPLASTIN TIME PARTIAL: CPT | Performed by: EMERGENCY MEDICINE

## 2024-10-29 PROCEDURE — 99285 EMERGENCY DEPT VISIT HI MDM: CPT

## 2024-10-29 RX ORDER — ASPIRIN 325 MG
325 TABLET ORAL ONCE
Status: COMPLETED | OUTPATIENT
Start: 2024-10-29 | End: 2024-10-29

## 2024-10-29 RX ORDER — LABETALOL HYDROCHLORIDE 5 MG/ML
10 INJECTION, SOLUTION INTRAVENOUS EVERY 4 HOURS PRN
Status: DISCONTINUED | OUTPATIENT
Start: 2024-10-29 | End: 2024-10-30

## 2024-10-29 RX ORDER — CARVEDILOL 6.25 MG/1
6.25 TABLET ORAL 2 TIMES DAILY WITH MEALS
Status: DISCONTINUED | OUTPATIENT
Start: 2024-10-29 | End: 2024-10-30

## 2024-10-29 RX ORDER — LABETALOL HYDROCHLORIDE 5 MG/ML
20 INJECTION, SOLUTION INTRAVENOUS ONCE
Status: COMPLETED | OUTPATIENT
Start: 2024-10-29 | End: 2024-10-29

## 2024-10-29 RX ORDER — ATORVASTATIN CALCIUM 80 MG/1
80 TABLET, FILM COATED ORAL
Status: DISCONTINUED | OUTPATIENT
Start: 2024-10-30 | End: 2024-11-06 | Stop reason: HOSPADM

## 2024-10-29 RX ORDER — CHLORHEXIDINE GLUCONATE ORAL RINSE 1.2 MG/ML
15 SOLUTION DENTAL EVERY 12 HOURS SCHEDULED
Status: DISCONTINUED | OUTPATIENT
Start: 2024-10-29 | End: 2024-11-06 | Stop reason: HOSPADM

## 2024-10-29 RX ORDER — ASPIRIN 325 MG
TABLET, DELAYED RELEASE (ENTERIC COATED) ORAL
Status: DISCONTINUED
Start: 2024-10-29 | End: 2024-10-29 | Stop reason: WASHOUT

## 2024-10-29 RX ORDER — CLOPIDOGREL BISULFATE 75 MG/1
300 TABLET ORAL ONCE
Status: COMPLETED | OUTPATIENT
Start: 2024-10-29 | End: 2024-10-29

## 2024-10-29 RX ORDER — METOPROLOL TARTRATE 1 MG/ML
5 INJECTION, SOLUTION INTRAVENOUS ONCE
Status: COMPLETED | OUTPATIENT
Start: 2024-10-29 | End: 2024-10-29

## 2024-10-29 RX ORDER — CLOPIDOGREL BISULFATE 75 MG/1
TABLET ORAL
Status: COMPLETED
Start: 2024-10-29 | End: 2024-10-29

## 2024-10-29 RX ADMIN — ASPIRIN 325 MG ORAL TABLET 325 MG: 325 PILL ORAL at 15:53

## 2024-10-29 RX ADMIN — IOHEXOL 100 ML: 350 INJECTION, SOLUTION INTRAVENOUS at 16:36

## 2024-10-29 RX ADMIN — CLOPIDOGREL 300 MG: 75 TABLET ORAL at 15:37

## 2024-10-29 RX ADMIN — METOPROLOL TARTRATE 5 MG: 5 INJECTION INTRAVENOUS at 17:42

## 2024-10-29 RX ADMIN — SODIUM CHLORIDE 2 MG/MIN: 0.9 INJECTION, SOLUTION INTRAVENOUS at 17:03

## 2024-10-29 RX ADMIN — CARVEDILOL 6.25 MG: 6.25 TABLET, FILM COATED ORAL at 21:10

## 2024-10-29 RX ADMIN — LABETALOL HYDROCHLORIDE 20 MG: 5 INJECTION, SOLUTION INTRAVENOUS at 16:38

## 2024-10-29 RX ADMIN — NICARDIPINE HYDROCHLORIDE 10 MG/HR: 2.5 INJECTION, SOLUTION INTRAVENOUS at 20:43

## 2024-10-29 RX ADMIN — SODIUM CHLORIDE 4 MG/MIN: 0.9 INJECTION, SOLUTION INTRAVENOUS at 17:28

## 2024-10-29 RX ADMIN — CLOPIDOGREL BISULFATE 300 MG: 75 TABLET ORAL at 15:37

## 2024-10-29 RX ADMIN — SODIUM CHLORIDE 5 MG/HR: 0.9 INJECTION, SOLUTION INTRAVENOUS at 17:04

## 2024-10-29 NOTE — ED PROVIDER NOTES
Time reflects when diagnosis was documented in both MDM as applicable and the Disposition within this note       Time User Action Codes Description Comment    10/29/2024  2:27 PM Marcelino Hyde [R29.898] Weakness of right lower extremity     10/29/2024  6:01 PM Marcelino Hyde [I71.9] Penetrating atherosclerotic ulcer of aorta (HCC)     10/29/2024  6:02 PM Marcelino Hyde [S27.892A] Mediastinal hematoma, initial encounter     10/29/2024  6:02 PM Marcelino Hyde [N17.9] ELZA (acute kidney injury) (HCC)     10/29/2024  6:02 PM Marcelino Hyde [R91.1] Pulmonary nodule           ED Disposition       ED Disposition   Transfer to Another Facility-In Network    Condition   --    Date/Time   Tue Oct 29, 2024  6:01 PM    Comment   Vlad Gregorio should be transferred out to [facility].               Assessment & Plan       Medical Decision Making  Patient presented with isolated right lower extremity weakness.  Initially NIH stroke scale was 0.  However, patient could not get out of bed on his own.  He was also having difficulty ambulating due to right leg weakness.  A stroke alert was called.  Neurology came down and evaluated the patient.  Recommended no tPA.  Recommended admission for stroke pathway.  Neurology ordered Plavix load and aspirin.  As patient did have a history of malignancy, imaging of the back was also done.  Without pain, a spinal cause of his symptoms would be unlikely.  Nevertheless, imaging was done and was negative for any metastasis or cord compression.  Patient had pulses in his leg.  His symptoms were not due to arterial occlusion in his right leg.    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  Unclear if this was the cause of his right leg symptoms.  Consulted with cardiothoracic surgery.  A formal CTA of the chest abdomen pelvis was also done.  No acute  intervention at this time as per cardiothoracic surgery, but recommended transfer to [place] in case patient does deteriorate.  Recommended keeping systolic blood pressure below 120.  Cardene and labetalol drips were ordered.    Consulted with critical care at [place] for transfer.  Accepted by Dr. Bello    Incidental pulmonary nodule was also found on imaging.  Patient was made aware of this and the necessity of follow-up on the chance that this could be malignancy.    Amount and/or Complexity of Data Reviewed  Labs: ordered. Decision-making details documented in ED Course.  Radiology: ordered and independent interpretation performed. Decision-making details documented in ED Course.  ECG/medicine tests: ordered and independent interpretation performed. Decision-making details documented in ED Course.  Discussion of management or test interpretation with external provider(s): Neurology, critical care, cardiothoracic surgery    Risk  Prescription drug management.  Decision regarding hospitalization.             Medications   iohexol (OMNIPAQUE) 350 MG/ML injection (MULTI-DOSE) 100 mL (has no administration in time range)   labetalol (NORMODYNE) 200 mg in sodium chloride 0.9 % 200 mL infusion (4 mg/min Intravenous New Bag 10/29/24 1728)   niCARdipine (CARDENE) 25 mg (STANDARD CONCENTRATION) in sodium chloride 0.9% 250 mL (7.5 mg/hr Intravenous Rate/Dose Change 10/29/24 1752)   aspirin tablet 325 mg (325 mg Oral Given 10/29/24 1553)   clopidogrel (PLAVIX) tablet 300 mg (300 mg Oral Given 10/29/24 1537)   labetalol (NORMODYNE) injection 20 mg (20 mg Intravenous Given 10/29/24 1638)   iohexol (OMNIPAQUE) 350 MG/ML injection (MULTI-DOSE) 100 mL (100 mL Intravenous Given 10/29/24 1636)   metoprolol (LOPRESSOR) injection 5 mg (5 mg Intravenous Given 10/29/24 1742)       ED Risk Strat Scores       Stroke Assessment       Row Name 10/29/24 1809             NIH Stroke Scale    Interval Baseline      Level of Consciousness  "(1a.) 0      LOC Questions (1b.) 0      LOC Commands (1c.) 0      Best Gaze (2.) 0      Visual (3.) 0      Facial Palsy (4.) 0      Motor Arm, Left (5a.) 0      Motor Arm, Right (5b.) 0      Motor Leg, Left (6a.) 0      Motor Leg, Right (6b.) 0      Limb Ataxia (7.) 0      Sensory (8.) 0      Best Language (9.) 0      Dysarthria (10.) 0      Extinction and Inattention (11.) (Formerly Neglect) 0      Total 0                                      SBIRT 22yo+      Flowsheet Row Most Recent Value   Initial Alcohol Screen: US AUDIT-C     1. How often do you have a drink containing alcohol? 0 Filed at: 10/29/2024 1407   2. How many drinks containing alcohol do you have on a typical day you are drinking?  0 Filed at: 10/29/2024 1407   3a. Male UNDER 65: How often do you have five or more drinks on one occasion? 0 Filed at: 10/29/2024 1407   3b. FEMALE Any Age, or MALE 65+: How often do you have 4 or more drinks on one occassion? 0 Filed at: 10/29/2024 1407   Audit-C Score 0 Filed at: 10/29/2024 1407   OMAR: How many times in the past year have you...    Used an illegal drug or used a prescription medication for non-medical reasons? Never Filed at: 10/29/2024 1407                            History of Present Illness       Chief Complaint   Patient presents with    Foot Pain     Pt coming from work d/t right foot feeling heavy. Pt states \"at about lunch time I was walking and all the sudden it felt like my foot was glued to the floor.\" Per EMS co-worker said he was shuffling both feet earlier in the day.       Past Medical History:   Diagnosis Date    Cancer (HCC)       Past Surgical History:   Procedure Laterality Date    RECTAL BIOPSY      RECTAL SURGERY        History reviewed. No pertinent family history.   Social History     Tobacco Use    Smoking status: Every Day     Current packs/day: 1.50     Types: Cigarettes    Smokeless tobacco: Never   Substance Use Topics    Alcohol use: Yes     Comment: states about 2 dozen " between every saturday and sunday    Drug use: No      E-Cigarette/Vaping      E-Cigarette/Vaping Substances      I have reviewed and agree with the history as documented.     Patient is a 64-year-old male.  Around noon he was at work when his right leg felt heavy.  He was having difficulty ambulating.  He denies numbness or paresthesias.  No history of claudication.  No paralysis.  No other extremity involvement.  No speech or visual complaints.  He does have a history of TIAs.  He is a smoker.  He does feel somewhat improved now.  No headache.  No back pain.  Patient has history of rectal cancer.  He had resection about 1 year ago.  Since then he said some degree of fecal incontinence.  Denies vertigo.        Review of Systems   Constitutional:  Negative for chills and fever.   HENT:  Negative for rhinorrhea and sore throat.    Eyes:  Negative for pain, redness and visual disturbance.   Respiratory:  Positive for wheezing. Negative for cough and shortness of breath.    Cardiovascular:  Negative for chest pain and leg swelling.   Gastrointestinal:  Negative for abdominal pain, diarrhea and vomiting.   Endocrine: Negative for polydipsia and polyuria.   Genitourinary:  Negative for dysuria, frequency and hematuria.   Musculoskeletal:  Negative for back pain and neck pain.   Skin:  Negative for rash and wound.   Allergic/Immunologic: Negative for immunocompromised state.   Neurological:  Positive for weakness. Negative for numbness and headaches.   Psychiatric/Behavioral:  Negative for hallucinations and suicidal ideas.    All other systems reviewed and are negative.          Objective       ED Triage Vitals [10/29/24 1407]   Temperature Pulse Blood Pressure Respirations SpO2 Patient Position - Orthostatic VS   99.1 °F (37.3 °C) 78 (!) 181/83 18 96 % Lying      Temp Source Heart Rate Source BP Location FiO2 (%) Pain Score    Oral Monitor Right arm -- No Pain      Vitals      Date and Time Temp Pulse SpO2 Resp BP Pain  Score FACES Pain Rating User   10/29/24 1805 -- 73 93 % 18 119/62 -- -- RN   10/29/24 1800 -- 74 94 % 18 129/63 -- -- RN   10/29/24 1755 -- 75 95 % 18 134/62 -- -- RN   10/29/24 1752 -- 75 -- -- 135/58 -- -- RN   10/29/24 1750 -- 75 95 % 20 135/58 -- -- RN   10/29/24 1745 -- 75 93 % 18 137/68 -- -- RN   10/29/24 1740 -- 74 94 % 18 141/75 -- -- RN   10/29/24 1735 -- 74 94 % 18 134/69 -- -- RN   10/29/24 1730 -- 75 94 % 18 142/69 -- -- RN   10/29/24 1728 -- 75 -- -- 150/72 -- -- RN   10/29/24 1725 -- 74 94 % 20 150/72 -- -- RN   10/29/24 1720 -- 75 95 % 20 153/77 -- -- RN   10/29/24 1718 -- 77 95 % 21 159/75 -- -- RN   10/29/24 1715 -- 72 94 % 22 159/75 -- -- RN   10/29/24 1710 -- 71 95 % 21 170/87 -- -- RN   10/29/24 1703 -- 70 -- -- 169/87 -- -- RN   10/29/24 1655 -- 70 96 % 19 183/85 -- -- RN   10/29/24 1650 -- 71 95 % 18 168/81 -- -- RN   10/29/24 1645 -- 71 95 % 18 171/79 -- -- RN   10/29/24 1640 -- 71 96 % 18 185/99 -- -- RN   10/29/24 1635 -- 71 98 % 19 185/100 -- -- RN   10/29/24 1630 -- 79 97 % 20 184/82 -- -- RN   10/29/24 1625 -- 73 97 % 19 148/77 -- -- RN   10/29/24 1615 -- 76 97 % 20 150/77 -- -- RN   10/29/24 1600 -- 77 98 % 20 182/75 -- -- RN   10/29/24 1545 -- 74 97 % 20 187/95 -- -- RN   10/29/24 1530 -- 76 96 % 18 174/90 -- -- RN   10/29/24 1515 -- 75 96 % 18 176/85 -- -- RN   10/29/24 1500 -- 77 96 % 20 178/82 -- -- RN   10/29/24 1445 -- 74 94 % 20 184/88 -- -- RN   10/29/24 1430 -- 74 96 % 20 176/87 -- -- RN   10/29/24 1407 99.1 °F (37.3 °C) 78 96 % 18 181/83 No Pain -- CM            Physical Exam  Vitals reviewed.   Constitutional:       General: He is not in acute distress.     Appearance: He is not toxic-appearing.   HENT:      Head: Normocephalic and atraumatic.      Nose: Nose normal.      Mouth/Throat:      Mouth: Mucous membranes are moist.   Eyes:      General: No visual field deficit.        Right eye: No discharge.         Left eye: No discharge.      Conjunctiva/sclera: Conjunctivae  normal.   Cardiovascular:      Rate and Rhythm: Normal rate and regular rhythm.      Pulses: Normal pulses.      Heart sounds: Normal heart sounds. No murmur heard.     No friction rub. No gallop.   Pulmonary:      Effort: Pulmonary effort is normal. No respiratory distress.      Breath sounds: No stridor. Wheezing present. No rhonchi or rales.   Abdominal:      General: Bowel sounds are normal. There is no distension.      Palpations: Abdomen is soft.      Tenderness: There is no abdominal tenderness. There is no right CVA tenderness, left CVA tenderness, guarding or rebound.   Musculoskeletal:         General: No swelling, tenderness, deformity or signs of injury. Normal range of motion.      Cervical back: Normal range of motion and neck supple. No rigidity.      Right lower leg: No edema.      Left lower leg: No edema.      Comments: No calf pain or unilateral leg swelling   Skin:     General: Skin is warm and dry.      Coloration: Skin is not jaundiced or pale.      Findings: No bruising, erythema or rash.   Neurological:      General: No focal deficit present.      Mental Status: He is alert and oriented to person, place, and time.      GCS: GCS eye subscore is 4. GCS verbal subscore is 5. GCS motor subscore is 6.      Cranial Nerves: No dysarthria or facial asymmetry.      Sensory: Sensation is intact. No sensory deficit.      Motor: Motor function is intact. No weakness.      Coordination: Finger-Nose-Finger Test normal.      Gait: Gait abnormal.      Comments: Patient has difficulty ambulating.   Psychiatric:         Mood and Affect: Mood normal.         Behavior: Behavior normal.         Results Reviewed       Procedure Component Value Units Date/Time    HS Troponin I 2hr [[id number]]  (Normal) Collected: 10/29/24 1645    Lab Status: Final result Specimen: Blood from Arm, Right Updated: 10/29/24 1718     hs TnI 2hr 12 ng/L      Delta 2hr hsTnI 1 ng/L     HS Troponin I 4hr [[id number]]     Lab Status: No  result Specimen: Blood     UA w Reflex to Microscopic w Reflex to Culture [[id number]]     Lab Status: No result Specimen: Urine     HS Troponin 0hr (reflex protocol) [[id number]]  (Normal) Collected: 10/29/24 1430    Lab Status: Final result Specimen: Blood from Arm, Right Updated: 10/29/24 1501     hs TnI 0hr 11 ng/L     Comprehensive metabolic panel [[id number]]  (Abnormal) Collected: 10/29/24 1430    Lab Status: Final result Specimen: Blood from Arm, Right Updated: 10/29/24 1454     Sodium 136 mmol/L      Potassium 4.1 mmol/L      Chloride 102 mmol/L      CO2 24 mmol/L      ANION GAP 10 mmol/L      BUN 18 mg/dL      Creatinine 1.59 mg/dL      Glucose 130 mg/dL      Calcium 9.1 mg/dL      AST 27 U/L      ALT 21 U/L      Alkaline Phosphatase 52 U/L      Total Protein 7.3 g/dL      Albumin 4.3 g/dL      Total Bilirubin 1.17 mg/dL      eGFR 45 ml/min/1.73sq m     Narrative:      National Kidney Disease Foundation guidelines for Chronic Kidney Disease (CKD):     Stage 1 with normal or high GFR (GFR > 90 mL/min/1.73 square meters)    Stage 2 Mild CKD (GFR = 60-89 mL/min/1.73 square meters)    Stage 3A Moderate CKD (GFR = 45-59 mL/min/1.73 square meters)    Stage 3B Moderate CKD (GFR = 30-44 mL/min/1.73 square meters)    Stage 4 Severe CKD (GFR = 15-29 mL/min/1.73 square meters)    Stage 5 End Stage CKD (GFR <15 mL/min/1.73 square meters)  Note: GFR calculation is accurate only with a steady state creatinine    Protime-INR [[id number]]  (Abnormal) Collected: 10/29/24 1430    Lab Status: Final result Specimen: Blood from Arm, Right Updated: 10/29/24 1451     Protime 15.7 seconds      INR 1.20    Narrative:      INR Therapeutic Range    Indication                                             INR Range      Atrial Fibrillation                                               2.0-3.0  Hypercoagulable State                                    2.0.2.3  Left Ventricular Asist Device                            2.0-3.0  Mechanical Heart  Valve                                  -    Aortic(with afib, MI, embolism, HF, LA enlargement,    and/or coagulopathy)                                     2.0-3.0 (2.5-3.5)     Mitral                                                             2.5-3.5  Prosthetic/Bioprosthetic Heart Valve               2.0-3.0  Venous thromboembolism (VTE: VT, PE        2.0-3.0    APTT [[id number]]  (Normal) Collected: 10/29/24 1430    Lab Status: Final result Specimen: Blood from Arm, Right Updated: 10/29/24 1451     PTT 26 seconds     CBC and Platelet [[id number]]  (Normal) Collected: 10/29/24 1430    Lab Status: Final result Specimen: Blood from Arm, Right Updated: 10/29/24 1437     WBC 5.12 Thousand/uL      RBC 4.84 Million/uL      Hemoglobin 14.1 g/dL      Hematocrit 41.2 %      MCV 85 fL      MCH 29.1 pg      MCHC 34.2 g/dL      RDW 13.0 %      Platelets 207 Thousands/uL      MPV 9.7 fL     Fingerstick Glucose (POCT) [[id number]]  (Normal) Collected: 10/29/24 1432    Lab Status: Final result Specimen: Blood Updated: 10/29/24 1432     POC Glucose 132 mg/dl             CTA dissection protocol chest/abdomen/pelvis   Final Interpretation by Kolton Dodson MD (10/29 1716)      Again noted along the undersurface of the posterior aortic arch is a contour abnormality in the wall as seen on images 605/93 and 606/103 with surrounding fluid most consistent with penetrating ulcer with focal contained rupture. No active extravasation    on the current exam and no worsening since the study from 2 hours prior. No aneurysm or dissection.      7 mm right apical nodule. No prior studies. Based on current Fleischner Society 2017 Guidelines on incidental pulmonary nodule, follow-up non-contrast CT is recommended at 6-12 months from the initial examination and, if stable at that time, an    additional follow-up is recommended for 18-24 months from the initial examination.      Findings consistent with chronic bronchitis.      Soft tissue stranding  surrounding the left adrenal gland and left ureter. This could be posttraumatic versus infection/inflammation.            Does the patient have a history of recent trauma?         Workstation performed: [id number]         CT stroke alert brain   Final Interpretation by Kehinde Boss MD (10/29 5861)      New hypodense areas involving the bilateral basal ganglia, which may be related to prior infarcts, or worsening chronic microangiopathic changes, but more recent infarcts in these regions cannot be excluded. Recommend further evaluation with brain MRI    without contrast.      Encephalomalacia involving the right occipital lobe related to prior infarct. Old thalamic lacunar infarct again demonstrated.      Mild to moderate chronic microangiopathic white matter changes.      Findings were directly discussed with Terrance Bill at approximately 2:55 p.m on 10/29/2024.      Workstation performed: [id number]         CTA stroke alert (head/neck)   Final Interpretation by Kehinde Boss MD (10/29 8195)      Irregularity/outpouching along the undersurface of the aortic arch, just distal to the origin of the left subclavian artery, with eccentric adjacent soft tissue density and stranding, extending anteriorly and medially, adjacent to the outpouching in the    mediastinum, and eccentric soft tissue density extending along the proximal left subclavian artery, and additional stranding extending into the anterior mediastinum. No visible dissection flap is identified. Given the appearance, this is most concerning    for a penetrating atherosclerotic ulcer, with associated rupture given the adjacent mediastinal soft tissue density/hematoma. Recommend dedicated CT angiogram of the aorta with contrast.      No large vessel occlusion, high-grade stenosis, or intracranial aneurysm identified on CT angiogram of the head.      Approximately 40 to 50% stenosis of the right cervical internal carotid artery due to atherosclerotic plaque at the  right carotid bulb/bifurcation. No stenosis of the left cervical internal carotid artery. No stenosis of the bilateral cervical vertebral    arteries.      Multiple pulmonary nodules involving the visualized right lung, including 9 mm right lung apical pulmonary nodule, with adjacent 8 mm irregular pulmonary nodule in the right lung apex and a 6 mm posterior right upper lobe pulmonary nodule. Using 2017    Fleischner guidelines, recommend follow-up low-dose noncontrast chest CT in 3 months time to document stability.         Findings regarding the CT angiogram were directly discussed with Terrance Bill at 3:00 p.m. on 10/29/2024.. Findings were also discussed with Dr. Draper, on 10/29/2024 at 3:37 p.m. including the pulmonary nodules.      Workstation performed: [id number]         X-ray chest 1 view portable   ED Interpretation by Sergio Draper MD (10/29 1258)   No infiltrates.  No CHF.      Final Interpretation by Zachariah Alvarado MD (10/29 0997)      Findings suggestive of interstitial pulmonary edema.            Resident: EMBER River I, the attending radiologist, have reviewed the images and agree with the final report above.      Workstation performed: [id number]         CT spine thoracic and lumbar wo contrast   Final Interpretation by Kolton Villarreal MD (10/29 7027)   Addendum (preliminary) 1 of 1 by Kolton Villarreal MD (10/29 1604)   ADDENDUM:         I personally provided preliminary results of this study with SERGIO DRAPER on 10/29/2024 3:45 PM.            Final         1. Grade 1 anterolisthesis of L5 on S1 secondary to chronic appearing bilateral spondylolysis.   2. Mild scattered multilevel spondylosis without acute fracture.   3. Semisolid right upper lobe nodule versus tree-in-bud nodularity. Surveillance imaging with repeat CT of the chest in 3 months is recommended to assess for resolution. Underlying malignancy is the diagnosis of exclusion.   4. Small amount of secretions in  the right lower lobe bronchus indicative of aspiration.   5. Thickening of the adrenal glands bilaterally possibly on the basis of adenomatous hyperplasia. Further clinical assessment advised.   6. Nonspecific perinephric and periureteral strandy densities on the left without hydronephrosis. Recently passed calculus or nonvisualized distal ureteral calculus in the differential diagnosis as well as left-sided pyelonephritis. Correlation with    urinalysis and clinical and laboratory values advised.            Workstation performed: [id number]             ECG 12 Lead Documentation Only    Date/Time: 10/29/2024 2:34 PM    Performed by: Marcelino Hyde MD  Authorized by: Marcelino Hyde MD    ECG reviewed by me, the ED Provider: yes    Patient location:  ED  Comments:      Normal sinus rhythm with first-degree AV block.  ST and T wave abnormality inferiorly and laterally.  Abnormal EKG.  CriticalCare Time    Date/Time: 10/29/2024 6:04 PM    Performed by: Marcelino Hyde MD  Authorized by: Marcelino Hyde MD    Critical care provider statement:     Critical care time (minutes):  90    Critical care time was exclusive of:  Separately billable procedures and treating other patients    Critical care was necessary to treat or prevent imminent or life-threatening deterioration of the following conditions:  CNS failure or compromise    Critical care was time spent personally by me on the following activities:  Obtaining history from patient or surrogate, development of treatment plan with patient or surrogate, discussions with consultants, evaluation of patient's response to treatment, examination of patient, ordering and performing treatments and interventions, ordering and review of laboratory studies, ordering and review of radiographic studies and re-evaluation of patient's condition    I assumed direction of critical care for this patient from another provider in my specialty: no        ED Medication and Procedure  Management   Prior to Admission Medications   Prescriptions Last Dose Informant Patient Reported? Taking?   magnesium oxide (MAG-OX) 400 mg   No No   Sig: Take 1 tablet (400 mg total) by mouth daily for 30 days   nicotine (NICODERM CQ) 14 mg/24hr TD 24 hr patch   No No   Sig: Place 1 patch on the skin daily      Facility-Administered Medications: None     Patient's Medications   Discharge Prescriptions    No medications on file     No discharge procedures on file.  ED SEPSIS DOCUMENTATION   Time reflects when diagnosis was documented in both MDM as applicable and the Disposition within this note       Time User Action Codes Description Comment    10/29/2024  2:27 PM Marcelino Hyde [R29.898] Weakness of right lower extremity     10/29/2024  6:01 PM Marcelino Hyde [I71.9] Penetrating atherosclerotic ulcer of aorta ([facility])     10/29/2024  6:02 PM Marcelino Hyde [S27.892A] Mediastinal hematoma, initial encounter     10/29/2024  6:02 PM Marcelino Hyde Add [N17.9] ELZA (acute kidney injury) ([facility])     10/29/2024  6:02 PM Marcelino Hyde [R91.1] Pulmonary nodule                  Marcelino Hyde MD  10/29/24 1804

## 2024-10-29 NOTE — EMTALA/ACUTE CARE TRANSFER
[facility] EMERGENCY DEPARTMENT  [address] [facility] MORENOH[facility] [address]  Dept: [phone]      EMTALA TRANSFER CONSENT    NAME Vlad RHODES 1960                              MRN [id number]    I have been informed of my rights regarding examination, treatment, and transfer   by Dr. Marcelino Hyde MD    Benefits: Specialized equipment and/or services available at the receiving facility (Include comment)________________________ (Cardiothoracic surgery)    Risks: Potential for delay in receiving treatment, Potential deterioration of medical condition, Loss of IV, Increased discomfort during transfer, Possible worsening of condition or death during transfer      Consent for Transfer:  I acknowledge that my medical condition has been evaluated and explained to me by the emergency department physician or other qualified medical person and/or my attending physician, who has recommended that I be transferred to the service of  Accepting Physician: Ace at Accepting Facility Name, City & State : [facility]. The above potential benefits of such transfer, the potential risks associated with such transfer, and the probable risks of not being transferred have been explained to me, and I fully understand them.  The doctor has explained that, in my case, the benefits of transfer outweigh the risks.  I agree to be transferred.    I authorize the performance of emergency medical procedures and treatments upon me in both transit and upon arrival at the receiving facility.  Additionally, I authorize the release of any and all medical records to the receiving facility and request they be transported with me, if possible.  I understand that the safest mode of transportation during a medical emergency is an ambulance and that the Hospital advocates the use of this mode of transport. Risks of traveling to the receiving facility by car, including  absence of medical control, life sustaining equipment, such as oxygen, and medical personnel has been explained to me and I fully understand them.    (DARLENE CORRECT BOX BELOW)  [  ]  I consent to the stated transfer and to be transported by ambulance/helicopter.  [  ]  I consent to the stated transfer, but refuse transportation by ambulance and accept full responsibility for my transportation by car.  I understand the risks of non-ambulance transfers and I exonerate the Hospital and its staff from any deterioration in my condition that results from this refusal.    X___________________________________________    DATE  10/29/24  TIME________  Signature of patient or legally responsible individual signing on patient behalf           RELATIONSHIP TO PATIENT_________________________          Provider Certification    NAME Vlad Gregorio                                         1960                              MRN [id number]    A medical screening exam was performed on the above named patient.  Based on the examination:    Condition Necessitating Transfer The primary encounter diagnosis was Weakness of right lower extremity. Diagnoses of Penetrating atherosclerotic ulcer of aorta (HCC), Mediastinal hematoma, initial encounter, ELZA (acute kidney injury) (HCC), and Pulmonary nodule were also pertinent to this visit.    Patient Condition: The patient has been stabilized such that within reasonable medical probability, no material deterioration of the patient condition or the condition of the unborn child(chase) is likely to result from the transfer    Reason for Transfer: Level of Care needed not available at this facility    Transfer Requirements: Facility [facility]   Space available and qualified personnel available for treatment as acknowledged by    Agreed to accept transfer and to provide appropriate medical treatment as acknowledged by       Ace  Appropriate medical records of the examination and  treatment of the patient are provided at the time of transfer   STAFF INITIAL WHEN COMPLETED _______  Transfer will be performed by qualified personnel from    and appropriate transfer equipment as required, including the use of necessary and appropriate life support measures.    Provider Certification: I have examined the patient and explained the following risks and benefits of being transferred/refusing transfer to the patient/family:  Unanticipated needs of medical equipment and personnel during transport, General risk, such as traffic hazards, adverse weather conditions, rough terrain or turbulence, possible failure of equipment (including vehicle or aircraft), or consequences of actions of persons outside the control of the transport personnel, Risk of worsening condition, The possibility of a transport vehicle being unavailable      Based on these reasonable risks and benefits to the patient and/or the unborn child(chase), and based upon the information available at the time of the patient’s examination, I certify that the medical benefits reasonably to be expected from the provision of appropriate medical treatments at another medical facility outweigh the increasing risks, if any, to the individual’s medical condition, and in the case of labor to the unborn child, from effecting the transfer.    X____________________________________________ DATE 10/29/24        TIME_______      ORIGINAL - SEND TO MEDICAL RECORDS   COPY - SEND WITH PATIENT DURING TRANSFER

## 2024-10-29 NOTE — ED NOTES
[facility] transport crew arrived to transport pt to [facility]. This RN gave report to transport crew and handed them appropriate paperwork.        Gerri Wiley RN  10/29/24 9091

## 2024-10-29 NOTE — ED NOTES
RN attempted to call report at this time. This RN called number given by [phone]. This RN called PAC and informed them that the number to call report is not working. PAC instructed this RN to call [phone]. This RN called second number provided by PAC and received no answer. Call back number for this RN is [phone].     Gerri Wiley RN  10/29/24 0760

## 2024-10-29 NOTE — ED NOTES
Pt BP decreased to 106/58 and pt reported that he started feeling dizzy and lightheaded. This RN notified ED provider and ED provider instructed this RN not to change gtt.      Gerri Wiley, RN  10/29/24 4619

## 2024-10-29 NOTE — ED NOTES
ED provider instructed this RN to administer Lopressor and then to wait 10 min before starting to titrate Cardene if needed.      Gerri Wiley RN  10/29/24 9371

## 2024-10-29 NOTE — ASSESSMENT & PLAN NOTE
- Family reports patient drinks ~1 case/weekend, may occasionally drink throughout the week  - CIWA protocol per primary team

## 2024-10-29 NOTE — CONSULTS
Consultation - Neurology   Name: Vlad Gregorio 64 y.o. male I MRN: [id number]  Unit/Bed#: ED 01 I Date of Admission: 10/29/2024   Date of Service: 10/29/2024 I Hospital Day: 0   Consult to Neurology  Consult performed by: ANDREI Lu  Consult ordered by: Marcelino Hyde MD        Physician Requesting Evaluation: Marcelino Hyde MD   Reason for Evaluation / Principal Problem: Weakness of RLE    Assessment & Plan  Right leg weakness  64 y.o. male with history of colon cancer, tobacco use (2 packs/day), alcohol abuse and TIA (2023, noncompliant with aspirin) who presents with right leg weakness.  Patient was at work when he noticed his right leg felt like it had sandbags in it at around 12 PM.  He denied RUE involvement, focal numbness, speech or visual disturbances, dizziness, headache, recent fall/injury.    BP on arrival 181/83. [facility] 1 for orientation. Initial CT imaging concerning for new hypodensities involving bilateral basal ganglia, could not rule out acute infarcts.  By the time of neurology evaluation in the ED, patient reported his right leg weakness returned back to baseline. Not a tNK candidate due to resolving symptoms.    Imaging:  - CTH   New hypodense areas involving the bilateral basal ganglia, which may be related to prior infarcts, or worsening chronic microangiopathic changes, but more recent infarcts in these regions cannot be excluded.   Encephalomalacia involving the right occipital lobe related to prior infarct. Old thalamic lacunar infarct again demonstrated.  Mild to moderate chronic microangiopathic white matter changes.  - CTA head/neck: No LVO seen, final read per radiologist pending  - MRI brain wo pending  - Echo pending  - Lipid panel: pending  - Hemoglobin A1c pending    Plan:  - Stroke pathway  - Will follow above mentioned workup  - Loaded with Aspirin 325 mg x 1 and Plavix 300 mg x 1 on 10/29. Continue with Aspirin 81 mg and Plavix 75 mg daily beginning 10/30  - Initiate  Atorvastatin 40 mg daily  - Maintain normotension as patient is back to baseline  - Euglycemic, normothermic goal  - Continue telemetry  - PT/OT/ST  - Secondary risk factor modification. Smoking cessation advised  - Stroke education  - Frequent neuro checks. Continue to monitor and notify neurology with any changes.  - STAT CT head for any acute change in neuro exam  - Medical management and supportive care per primary team. Correction of any metabolic or infectious disturbances.     Plan discussed with Attending Neurologist, please see attestation for further input/recommendations.   Alcohol abuse  - Family reports patient drinks ~1 case/weekend, may occasionally drink throughout the week  - CIWA protocol per primary team  Tobacco abuse  - Family reports patient smokes ~ 2 packs/day  - Consider nicotine patch while inpatient      Thrombolytic Decision: Patient not a candidate. Symptoms resolved/clearly non disabling.    Recommendations for outpatient neurological follow up have yet to be determined.    History of Present Illness   Hx and PE limited by: N/A, history obtained by patient and family members  Patient last known well: 12 PM 10/29/2024  Stroke alert called: 2:27 PM  Neurology time of arrival: 2:28 PM  HPI: Vlad Gregorio is a 64 y.o.  male with history of colon cancer, tobacco use (2 packs/day), alcohol abuse and TIA (2023, noncompliant with aspirin) who presents with right leg weakness.  Patient was at work when he noticed his right leg felt like it had sandbags in it at around 12 PM.  He denied RUE involvement, focal numbness, speech or visual disturbances, dizziness, headache, recent fall/injury.    BP on arrival 181/83. [facility] (did not know the hospital name/town). Initial CT imaging concerning for new hypodensities involving bilateral basal ganglia, could not rule out acute infarcts.  By the time of neurology evaluation in the ED, patient reported his right leg weakness returned back to  baseline.    Patient's family at bedside reports he may have Wernicke's encephalopathy secondary to alcohol use.  Family reports he drinks 1 case every weekend, however may occasionally drink during the week.  He does get intermittently confused.  His son stated his dad was at his friends house on Saturday, and called his son to tell him he was at his own house which was incorrect.  Patient also did not remember the color of his son's truck.  Patient also asked to go grocery shopping which is not a normal request from him.     Patient reported he takes a baby aspirin after he was told he had a mini stroke in 2023 (unable to review records at this time).  He states he only takes it during the week and does not want to take it on the weekends.  He does not take additional blood thinners at baseline.      Review of Systems   Unable to perform ROS: Acuity of condition     Historical Information   Past Medical History:   Diagnosis Date    Cancer (HCC)      Past Surgical History:   Procedure Laterality Date    RECTAL BIOPSY      RECTAL SURGERY       Social History     Tobacco Use    Smoking status: Every Day     Current packs/day: 1.50     Types: Cigarettes    Smokeless tobacco: Never   Substance and Sexual Activity    Alcohol use: Yes     Comment: states about 2 dozen between every saturday and sunday    Drug use: No    Sexual activity: Not on file     E-Cigarette/Vaping     E-Cigarette/Vaping Substances       Social History     Tobacco Use    Smoking status: Every Day     Current packs/day: 1.50     Types: Cigarettes    Smokeless tobacco: Never   Substance and Sexual Activity    Alcohol use: Yes     Comment: states about 2 dozen between every saturday and sunday    Drug use: No    Sexual activity: Not on file       Current Facility-Administered Medications:     aspirin (ECOTRIN) 325 mg EC tablet **ADS Override Pull**,     aspirin tablet 325 mg, Once    clopidogrel (PLAVIX) tablet 300 mg, Once    iohexol (OMNIPAQUE) 350  MG/ML injection (MULTI-DOSE) 100 mL, Once in imaging  Prior to Admission Medications   Prescriptions Last Dose Informant Patient Reported? Taking?   magnesium oxide (MAG-OX) 400 mg   No No   Sig: Take 1 tablet (400 mg total) by mouth daily for 30 days   nicotine (NICODERM CQ) 14 mg/24hr TD 24 hr patch   No No   Sig: Place 1 patch on the skin daily      Facility-Administered Medications: None     Patient has no known allergies.    Objective :  Temp:  [99.1 °F (37.3 °C)] 99.1 °F (37.3 °C)  HR:  [74-78] 74  BP: (176-184)/(83-88) 184/88  Resp:  [18-20] 20  SpO2:  [94 %-96 %] 94 %  O2 Device: None (Room air)    Physical Exam  Vitals reviewed. Chaperone present: Daughter and son present at bedside.   Constitutional:       Comments: Patient is alert, well-nourished however unkempt appearing.  Does not appear to be in acute distress during evaluation   HENT:      Head: Normocephalic and atraumatic.      Right Ear: External ear normal.      Left Ear: External ear normal.      Nose: Nose normal.      Mouth/Throat:      Mouth: Mucous membranes are moist.   Eyes:      General:         Right eye: No discharge.         Left eye: No discharge.      Extraocular Movements: Extraocular movements intact and EOM normal.      Conjunctiva/sclera: Conjunctivae normal.      Pupils: Pupils are equal, round, and reactive to light.   Cardiovascular:      Rate and Rhythm: Normal rate.   Pulmonary:      Effort: Pulmonary effort is normal. No respiratory distress.   Musculoskeletal:         General: Normal range of motion.      Right lower leg: No edema.      Left lower leg: No edema.   Skin:     General: Skin is warm and dry.      Coloration: Skin is not jaundiced or pale.      Comments: Dried dirt noticed on all extremities   Neurological:      Motor: Motor strength is normal.     Coordination: Finger-Nose-Finger Test and Heel to Shin Test normal.      Comments: See below       Neurologic Exam     Mental Status     Patient is alert. Oriented  to person, month, day, year and situation.  Stated he was at [facility], did not know the town he was in.  Able to follow two-step commands.  Normal attention/concentration.  Speech is normal, no aphasia or dysarthria noted.  He is able to name objects, read and repeat phrases.     Cranial Nerves     CN II   Visual fields full to confrontation.     CN III, IV, VI   Pupils are equal, round, and reactive to light.  Extraocular motions are normal.   Nystagmus: none   Diplopia: none  Conjugate gaze: present    CN V   Facial sensation intact.     CN VII   Facial expression full, symmetric.     CN VIII   CN VIII normal.     CN XII   CN XII normal.     Motor Exam   Muscle bulk: normal  Right arm pronator drift: absent  Left arm pronator drift: absent    Strength   Strength 5/5 throughout.     Sensory Exam   Light touch normal.     Reported symmetric decrease pinprick sensation throughout all extremities     Gait, Coordination, and Reflexes     Coordination   Finger to nose coordination: normal  Heel to shin coordination: normal    Tremor   Resting tremor: absent  Intention tremor: absent        NIHSS:  1a.Level of Consciousness: 0 = Alert   1b. LOC Questions: 1 = Answers one correctly   1c. LOC Commands: 0 = Obeys both correctly   2. Best Gaze: 0 = Normal   3. Visual: 0 = No visual field loss   4. Facial Palsy: 0=Normal symmetric movement   5a. Motor Right Arm: 0=No drift, limb holds 90 (or 45) degrees for full 10 seconds   5b. Motor Left Arm: 0=No drift, limb holds 90 (or 45) degrees for full 10 seconds   6a. Motor Right Le=No drift, limb holds 90 (or 45) degrees for full 10 seconds   6b. Motor Left Le=No drift, limb holds 90 (or 45) degrees for full 10 seconds   7. Limb Ataxia:  0=Absent   8. Sensory: 0=Normal; no sensory loss   9. Best Language:  0=No aphasia, normal   10. Dysarthria: 0=Normal articulation   11. Extinction and Inattention (formerly Neglect): 0=No abnormality   Total Score: 1     Time  NIHSS was completed: After CT imaging    Modified Basia Score:  0 (No baseline symptoms/disability)      Lab Results: I have reviewed the following results:CBC/BMP:   .     10/29/24  1430   WBC 5.12   HGB 14.1   HCT 41.2      SODIUM 136   K 4.1      CO2 24   BUN 18   CREATININE 1.59*   GLUC 130        Imaging Results Review: CT head, CTA head/neck      VTE Prophylaxis: VTE covered by:    None        Code Status: Prior  Advance Directive and Living Will:      Power of :    POLST:

## 2024-10-29 NOTE — ASSESSMENT & PLAN NOTE
64 y.o. male with history of colon cancer, tobacco use (2 packs/day), alcohol abuse and TIA (2023, noncompliant with aspirin) who presents with right leg weakness.  Patient was at work when he noticed his right leg felt like it had sandbags in it at around 12 PM.  He denied RUE involvement, focal numbness, speech or visual disturbances, dizziness, headache, recent fall/injury.    BP on arrival 181/83. [facility] 1 for orientation. Initial CT imaging concerning for new hypodensities involving bilateral basal ganglia, could not rule out acute infarcts.  By the time of neurology evaluation in the ED, patient reported his right leg weakness returned back to baseline. Not a tNK candidate due to resolving symptoms.    Imaging:  - CTH   New hypodense areas involving the bilateral basal ganglia, which may be related to prior infarcts, or worsening chronic microangiopathic changes, but more recent infarcts in these regions cannot be excluded.   Encephalomalacia involving the right occipital lobe related to prior infarct. Old thalamic lacunar infarct again demonstrated.  Mild to moderate chronic microangiopathic white matter changes.  - CTA head/neck: No LVO seen, final read per radiologist pending  - MRI brain wo pending  - Echo pending  - Lipid panel: pending  - Hemoglobin A1c pending    Plan:  - Stroke pathway  - Will follow above mentioned workup  - Loaded with Aspirin 325 mg x 1 and Plavix 300 mg x 1 on 10/29. Continue with Aspirin 81 mg and Plavix 75 mg daily beginning 10/30  - Initiate Atorvastatin 40 mg daily  - Maintain normotension as patient is back to baseline  - Euglycemic, normothermic goal  - Continue telemetry  - PT/OT/ST  - Secondary risk factor modification. Smoking cessation advised  - Stroke education  - Frequent neuro checks. Continue to monitor and notify neurology with any changes.  - STAT CT head for any acute change in neuro exam  - Medical management and supportive care per primary team. Correction of any  metabolic or infectious disturbances.     Plan discussed with Attending Neurologist, please see attestation for further input/recommendations.

## 2024-10-29 NOTE — ED NOTES
ED provider instructed this RN to initiate Labetalol and Cardene gtt at the same time. ED provider instructed this RN to then titrate Labetalol first, before titration of Cardene, every 5-10min by 1mg up to a max of 4mg. ED provider then stated to this RN that once pt is maxed out on Labetalol gtt at 4mg to begin Cardene titration if needed as ordered in MAR. This RN requested ED provider to change Labetalol order to reflect his verbal titration order that was given to this RN.           Gerri Wiley RN  10/29/24 2204

## 2024-10-30 ENCOUNTER — APPOINTMENT (INPATIENT)
Dept: NON INVASIVE DIAGNOSTICS | Facility: HOSPITAL | Age: 64
DRG: 064 | End: 2024-10-30
Payer: COMMERCIAL

## 2024-10-30 DIAGNOSIS — Q25.40 ABNORMALITY OF AORTIC ARCH: ICD-10-CM

## 2024-10-30 DIAGNOSIS — I71.9 PENETRATING ULCER OF AORTA (HCC): Primary | ICD-10-CM

## 2024-10-30 LAB
ALBUMIN SERPL BCG-MCNC: 3.9 G/DL (ref 3.5–5)
ALP SERPL-CCNC: 52 U/L (ref 34–104)
ALT SERPL W P-5'-P-CCNC: 16 U/L (ref 7–52)
ANION GAP SERPL CALCULATED.3IONS-SCNC: 10 MMOL/L (ref 4–13)
AORTIC ROOT: 4.5 CM
APICAL FOUR CHAMBER EJECTION FRACTION: 67 %
ASCENDING AORTA: 3.7 CM
AST SERPL W P-5'-P-CCNC: 22 U/L (ref 13–39)
BILIRUB SERPL-MCNC: 1.14 MG/DL (ref 0.2–1)
BSA FOR ECHO PROCEDURE: 2 M2
BUN SERPL-MCNC: 13 MG/DL (ref 5–25)
CA-I BLD-SCNC: 1.1 MMOL/L (ref 1.12–1.32)
CALCIUM SERPL-MCNC: 8.4 MG/DL (ref 8.4–10.2)
CHLORIDE SERPL-SCNC: 104 MMOL/L (ref 96–108)
CHOLEST SERPL-MCNC: 123 MG/DL
CO2 SERPL-SCNC: 21 MMOL/L (ref 21–32)
CREAT SERPL-MCNC: 1.01 MG/DL (ref 0.6–1.3)
DOP CALC LVOT AREA: 3.46 CM2
DOP CALC LVOT DIAMETER: 2.1 CM
E WAVE DECELERATION TIME: 181 MS
E/A RATIO: 1.11
ERYTHROCYTE [DISTWIDTH] IN BLOOD BY AUTOMATED COUNT: 13.2 % (ref 11.6–15.1)
EST. AVERAGE GLUCOSE BLD GHB EST-MCNC: 111 MG/DL
FRACTIONAL SHORTENING: 35 (ref 28–44)
GFR SERPL CREATININE-BSD FRML MDRD: 78 ML/MIN/1.73SQ M
GLUCOSE SERPL-MCNC: 106 MG/DL (ref 65–140)
GLUCOSE SERPL-MCNC: 110 MG/DL (ref 65–140)
GLUCOSE SERPL-MCNC: 116 MG/DL (ref 65–140)
HBA1C MFR BLD: 5.5 %
HCT VFR BLD AUTO: 37.6 % (ref 36.5–49.3)
HDLC SERPL-MCNC: 52 MG/DL
HGB BLD-MCNC: 13.1 G/DL (ref 12–17)
INR PPP: 1.15 (ref 0.85–1.19)
INTERVENTRICULAR SEPTUM IN DIASTOLE (PARASTERNAL SHORT AXIS VIEW): 1.2 CM
INTERVENTRICULAR SEPTUM: 1.2 CM (ref 0.6–1.1)
LAAS-AP2: 21.1 CM2
LAAS-AP4: 18.7 CM2
LDLC SERPL CALC-MCNC: 61 MG/DL (ref 0–100)
LEFT ATRIUM SIZE: 3.4 CM
LEFT ATRIUM VOLUME (MOD BIPLANE): 61 ML
LEFT INTERNAL DIMENSION IN SYSTOLE: 3 CM (ref 2.1–4)
LEFT VENTRICULAR INTERNAL DIMENSION IN DIASTOLE: 4.6 CM (ref 3.5–6)
LEFT VENTRICULAR POSTERIOR WALL IN END DIASTOLE: 1.3 CM
LEFT VENTRICULAR STROKE VOLUME: 64 ML
LVSV (TEICH): 64 ML
MAGNESIUM SERPL-MCNC: 2.1 MG/DL (ref 1.9–2.7)
MCH RBC QN AUTO: 29.7 PG (ref 26.8–34.3)
MCHC RBC AUTO-ENTMCNC: 34.8 G/DL (ref 31.4–37.4)
MCV RBC AUTO: 85 FL (ref 82–98)
MV E'TISSUE VEL-LAT: 7 CM/S
MV E'TISSUE VEL-SEP: 7 CM/S
MV PEAK A VEL: 0.55 M/S
MV PEAK E VEL: 61 CM/S
MV STENOSIS PRESSURE HALF TIME: 53 MS
MV VALVE AREA P 1/2 METHOD: 4.15
PHOSPHATE SERPL-MCNC: 3.5 MG/DL (ref 2.3–4.1)
PLATELET # BLD AUTO: 182 THOUSANDS/UL (ref 149–390)
PMV BLD AUTO: 9.4 FL (ref 8.9–12.7)
POTASSIUM SERPL-SCNC: 4 MMOL/L (ref 3.5–5.3)
PROT SERPL-MCNC: 6.5 G/DL (ref 6.4–8.4)
PROTHROMBIN TIME: 15 SECONDS (ref 12.3–15)
RA PRESSURE ESTIMATED: 5 MMHG
RBC # BLD AUTO: 4.41 MILLION/UL (ref 3.88–5.62)
RIGHT ATRIUM AREA SYSTOLE A4C: 13.4 CM2
RIGHT VENTRICLE ID DIMENSION: 3.4 CM
SINOTUBULAR JUNCTION: 3.2 CM
SL CV LEFT ATRIUM LENGTH A2C: 5 CM
SL CV LV EF: 65
SL CV PED ECHO LEFT VENTRICLE DIASTOLIC VOLUME (MOD BIPLANE) 2D: 99 ML
SL CV PED ECHO LEFT VENTRICLE SYSTOLIC VOLUME (MOD BIPLANE) 2D: 35 ML
SL CV SINUS OF VALSALVA 2D: 4.2 CM
SODIUM SERPL-SCNC: 135 MMOL/L (ref 135–147)
STJ: 3.2 CM
TRICUSPID ANNULAR PLANE SYSTOLIC EXCURSION: 2.3 CM
TRIGL SERPL-MCNC: 52 MG/DL
WBC # BLD AUTO: 5.41 THOUSAND/UL (ref 4.31–10.16)

## 2024-10-30 PROCEDURE — 83036 HEMOGLOBIN GLYCOSYLATED A1C: CPT | Performed by: PHYSICIAN ASSISTANT

## 2024-10-30 PROCEDURE — 85027 COMPLETE CBC AUTOMATED: CPT | Performed by: PHYSICIAN ASSISTANT

## 2024-10-30 PROCEDURE — 99233 SBSQ HOSP IP/OBS HIGH 50: CPT | Performed by: PHYSICIAN ASSISTANT

## 2024-10-30 PROCEDURE — 80061 LIPID PANEL: CPT | Performed by: PHYSICIAN ASSISTANT

## 2024-10-30 PROCEDURE — 82330 ASSAY OF CALCIUM: CPT | Performed by: PHYSICIAN ASSISTANT

## 2024-10-30 PROCEDURE — 85610 PROTHROMBIN TIME: CPT | Performed by: PHYSICIAN ASSISTANT

## 2024-10-30 PROCEDURE — 83735 ASSAY OF MAGNESIUM: CPT | Performed by: PHYSICIAN ASSISTANT

## 2024-10-30 PROCEDURE — 99255 IP/OBS CONSLTJ NEW/EST HI 80: CPT | Performed by: PHYSICIAN ASSISTANT

## 2024-10-30 PROCEDURE — 93306 TTE W/DOPPLER COMPLETE: CPT | Performed by: INTERNAL MEDICINE

## 2024-10-30 PROCEDURE — 84100 ASSAY OF PHOSPHORUS: CPT | Performed by: PHYSICIAN ASSISTANT

## 2024-10-30 PROCEDURE — 82948 REAGENT STRIP/BLOOD GLUCOSE: CPT

## 2024-10-30 PROCEDURE — 93306 TTE W/DOPPLER COMPLETE: CPT

## 2024-10-30 PROCEDURE — 99233 SBSQ HOSP IP/OBS HIGH 50: CPT | Performed by: STUDENT IN AN ORGANIZED HEALTH CARE EDUCATION/TRAINING PROGRAM

## 2024-10-30 PROCEDURE — 80053 COMPREHEN METABOLIC PANEL: CPT | Performed by: PHYSICIAN ASSISTANT

## 2024-10-30 PROCEDURE — NC001 PR NO CHARGE: Performed by: PHYSICIAN ASSISTANT

## 2024-10-30 RX ORDER — HEPARIN SODIUM 5000 [USP'U]/ML
5000 INJECTION, SOLUTION INTRAVENOUS; SUBCUTANEOUS EVERY 8 HOURS SCHEDULED
Status: DISCONTINUED | OUTPATIENT
Start: 2024-10-30 | End: 2024-11-06 | Stop reason: HOSPADM

## 2024-10-30 RX ORDER — ASPIRIN 81 MG/1
81 TABLET ORAL DAILY
Status: DISCONTINUED | OUTPATIENT
Start: 2024-10-30 | End: 2024-11-06 | Stop reason: HOSPADM

## 2024-10-30 RX ORDER — CLOPIDOGREL BISULFATE 75 MG/1
75 TABLET ORAL DAILY
Status: DISCONTINUED | OUTPATIENT
Start: 2024-10-30 | End: 2024-11-06 | Stop reason: HOSPADM

## 2024-10-30 RX ORDER — LANOLIN ALCOHOL/MO/W.PET/CERES
100 CREAM (GRAM) TOPICAL DAILY
Status: DISCONTINUED | OUTPATIENT
Start: 2024-10-30 | End: 2024-11-01

## 2024-10-30 RX ORDER — LABETALOL HYDROCHLORIDE 5 MG/ML
10 INJECTION, SOLUTION INTRAVENOUS EVERY 4 HOURS PRN
Status: DISCONTINUED | OUTPATIENT
Start: 2024-10-30 | End: 2024-10-31

## 2024-10-30 RX ORDER — LORAZEPAM 2 MG/ML
4 INJECTION INTRAMUSCULAR ONCE
Status: COMPLETED | OUTPATIENT
Start: 2024-10-30 | End: 2024-10-30

## 2024-10-30 RX ORDER — CARVEDILOL 6.25 MG/1
6.25 TABLET ORAL ONCE
Status: COMPLETED | OUTPATIENT
Start: 2024-10-30 | End: 2024-10-30

## 2024-10-30 RX ORDER — CARVEDILOL 12.5 MG/1
12.5 TABLET ORAL 2 TIMES DAILY WITH MEALS
Status: DISCONTINUED | OUTPATIENT
Start: 2024-10-30 | End: 2024-11-06 | Stop reason: HOSPADM

## 2024-10-30 RX ADMIN — THIAMINE HCL TAB 100 MG 100 MG: 100 TAB at 09:44

## 2024-10-30 RX ADMIN — LABETALOL HYDROCHLORIDE 10 MG: 5 INJECTION, SOLUTION INTRAVENOUS at 14:32

## 2024-10-30 RX ADMIN — ATORVASTATIN CALCIUM 80 MG: 80 TABLET, FILM COATED ORAL at 15:36

## 2024-10-30 RX ADMIN — LABETALOL HYDROCHLORIDE 10 MG: 5 INJECTION, SOLUTION INTRAVENOUS at 23:43

## 2024-10-30 RX ADMIN — ASPIRIN 81 MG: 81 TABLET, COATED ORAL at 15:36

## 2024-10-30 RX ADMIN — CARVEDILOL 6.25 MG: 6.25 TABLET, FILM COATED ORAL at 08:10

## 2024-10-30 RX ADMIN — HEPARIN SODIUM 5000 UNITS: 5000 INJECTION INTRAVENOUS; SUBCUTANEOUS at 14:06

## 2024-10-30 RX ADMIN — HEPARIN SODIUM 5000 UNITS: 5000 INJECTION INTRAVENOUS; SUBCUTANEOUS at 00:48

## 2024-10-30 RX ADMIN — PHENOBARBITAL SODIUM 650 MG: 65 INJECTION INTRAMUSCULAR at 23:54

## 2024-10-30 RX ADMIN — NICARDIPINE HYDROCHLORIDE 10 MG/HR: 2.5 INJECTION, SOLUTION INTRAVENOUS at 09:33

## 2024-10-30 RX ADMIN — CHLORHEXIDINE GLUCONATE 0.12% ORAL RINSE 15 ML: 1.2 LIQUID ORAL at 20:36

## 2024-10-30 RX ADMIN — CLOPIDOGREL BISULFATE 75 MG: 75 TABLET, FILM COATED ORAL at 15:36

## 2024-10-30 RX ADMIN — NICARDIPINE HYDROCHLORIDE 2.5 MG/HR: 2.5 INJECTION, SOLUTION INTRAVENOUS at 13:00

## 2024-10-30 RX ADMIN — NICARDIPINE HYDROCHLORIDE 7.5 MG/HR: 2.5 INJECTION, SOLUTION INTRAVENOUS at 06:32

## 2024-10-30 RX ADMIN — LORAZEPAM 4 MG: 2 INJECTION INTRAMUSCULAR; INTRAVENOUS at 23:15

## 2024-10-30 RX ADMIN — CHLORHEXIDINE GLUCONATE 0.12% ORAL RINSE 15 ML: 1.2 LIQUID ORAL at 08:10

## 2024-10-30 RX ADMIN — HEPARIN SODIUM 5000 UNITS: 5000 INJECTION INTRAVENOUS; SUBCUTANEOUS at 05:33

## 2024-10-30 RX ADMIN — CARVEDILOL 12.5 MG: 12.5 TABLET, FILM COATED ORAL at 15:36

## 2024-10-30 RX ADMIN — CARVEDILOL 6.25 MG: 6.25 TABLET, FILM COATED ORAL at 11:40

## 2024-10-30 RX ADMIN — HEPARIN SODIUM 5000 UNITS: 5000 INJECTION INTRAVENOUS; SUBCUTANEOUS at 22:33

## 2024-10-30 NOTE — PROGRESS NOTES
Progress Note - Critical Care/ICU   Name: Vlad Gregorio 64 y.o. male I MRN: [id number]  Unit/Bed#: [facility]PHP-313-01 I Date of Admission: 10/29/2024   Date of Service: 10/30/2024 I Hospital Day: 1      Assessment & Plan  Penetrating atherosclerotic ulcer of aorta (HCC)  Presented to [facility] on 10/29 with R leg weakness   Found to have AZ on imaging with contained rupture  CTA C/A/P 10/29: Again noted along the undersurface of the posterior aortic arch is a contour abnormality in the wall as seen on images 605/93 and 606/103 with surrounding fluid most consistent with penetrating ulcer with focal contained rupture. No active extravasation on the current exam and no worsening since the study from 2 hours prior. No aneurysm or dissection  Hypertensive on arrival - initiated on labetalol gtt  Risk factors:  Likely undiagnosed HTN/HLD  Life-long smoker  ETOH use    Plan:  CTS consult  SBP goal <120mmHg  Cardene gtt currently at 2.5mg/hr - wean to off  Started on carvedilol 6.25 overnight, continue as tolerated  Q1hr Neurovasc checks  Timing for repeat imaging per CTS  Right leg weakness  Initially presented with acute RLE weakness - stroke alert negative for LVO, no TNK given s/s improvement in RLE symptoms  CTH/CTA 10/29:  New hypodense areas involving the bilateral basal ganglia, which may be related to prior infarcts, or worsening chronic microangiopathic changes, but more recent infarcts in these regions cannot be excluded. Recommend further evaluation with brain MRI without contrast.   Encephalomalacia involving the right occipital lobe related to prior infarct. Old thalamic lacunar infarct again demonstrated.  Negative for LVO  Loaded with asa/plavix    Plan:  Unclear is symptoms are related to AZ/PAD vs previous/Acute on chronic CVA's  Appreciate neuro consult  Hold ASA/plavix until cleared by CTS  Stroke pathway  MRI brain w/o  TTE echo  Lipis panel  A1C  Start high dose statin  Disposition: Stepdown Level 1    ICU  Core Measures     A: Assess, Prevent, and Manage Pain Has pain been assessed? Yes  Need for changes to pain regimen? No   B: Both SAT/SAT  N/A   C: Choice of Sedation RASS Goal: 0 Alert and Calm  Need for changes to sedation or analgesia regimen? No   D: Delirium CAM-ICU: Negative   E: Early Mobility  Plan for early mobility? Yes   F: Family Engagement Plan for family engagement today? Yes         Prophylaxis:  VTE Contraindicated secondary to: n/a - HSQ ordered   Stress Ulcer  not ordered         24 Hour Events : Started on carvedilol overnight, patient weaned significantly on cardene gtt, almost off, no complaints. RLE weakness stable.     Subjective   Review of Systems: See HPI for Review of Systems    Objective :                   Vitals I/O      Most Recent Min/Max in 24hrs   Temp 98.3 °F (36.8 °C) Temp  Min: 98.3 °F (36.8 °C)  Max: 99.1 °F (37.3 °C)   Pulse 65 Pulse  Min: 65  Max: 79   Resp 17 Resp  Min: 17  Max: 29   /53 BP  Min: 104/53  Max: 187/95   O2 Sat 94 % SpO2  Min: 93 %  Max: 98 %      Intake/Output Summary (Last 24 hours) at 10/30/2024 0034  Last data filed at 10/29/2024 2300  Gross per 24 hour   Intake 348.33 ml   Output 100 ml   Net 248.33 ml       Diet Regular; Regular House    Invasive Monitoring           Physical Exam   Physical Exam  Vitals and nursing note reviewed.   Eyes:      Extraocular Movements: Extraocular movements intact.      Pupils: Pupils are equal, round, and reactive to light.   Skin:     General: Skin is warm and dry.   HENT:      Head: Normocephalic and atraumatic.   Neck:      Vascular: No JVD.   Cardiovascular:      Rate and Rhythm: Normal rate and regular rhythm.      Pulses: Normal pulses. Pulses are Palpable PT/DP bilateral LE.      Heart sounds: Normal heart sounds.   Musculoskeletal:      Right lower leg: Trace Edema present.      Left lower leg: Trace Edema present.   Abdominal:      Palpations: Abdomen is soft.      Tenderness: There is no abdominal tenderness.  There is no guarding.   Constitutional:       General: He is not in acute distress.     Appearance: He is ill-appearing. He is not toxic-appearing.   Pulmonary:      Effort: Pulmonary effort is normal. No respiratory distress.      Breath sounds: No stridor. Wheezing present. No rhonchi or rales.   Neurological:      General: No focal deficit present.      Mental Status: He is alert and oriented to person, place and time.      Motor: Strength full and intact in all extremities.          Diagnostic Studies        Lab Results: I have reviewed the following results:     Medications:  Scheduled PRN   atorvastatin, 80 mg, Daily With Dinner  carvedilol, 6.25 mg, BID With Meals  chlorhexidine, 15 mL, Q12H SHEA  heparin (porcine), 5,000 Units, Q8H SHEA      labetalol, 10 mg, Q4H PRN       Continuous    niCARdipine, 1-15 mg/hr, Last Rate: 2.5 mg/hr (10/29/24 2336)         Labs:   CBC    Recent Labs     10/29/24  1430   WBC 5.12   HGB 14.1   HCT 41.2        BMP    Recent Labs     10/29/24  1430   SODIUM 136   K 4.1      CO2 24   AGAP 10   BUN 18   CREATININE 1.59*   CALCIUM 9.1       Coags    Recent Labs     10/29/24  1430   INR 1.20*   PTT 26        Additional Electrolytes  No recent results       Blood Gas    No recent results  No recent results LFTs  Recent Labs     10/29/24  1430   ALT 21   AST 27   ALKPHOS 52   ALB 4.3   TBILI 1.17*       Infectious  No recent results  Glucose  Recent Labs     10/29/24  1430   GLUC 130

## 2024-10-30 NOTE — UTILIZATION REVIEW
"Initial Clinical Review    Admission: Date/Time/Statement:   Admission Orders (From admission, onward)       Ordered        10/29/24 1943  Inpatient Admission  Once                          Orders Placed This Encounter   Procedures    Inpatient Admission     Standing Status:   Standing     Number of Occurrences:   1     Order Specific Question:   Level of Care     Answer:   Critical Care [15]     Order Specific Question:   Estimated length of stay     Answer:   More than 2 Midnights     Order Specific Question:   Certification     Answer:   I certify that inpatient services are medically necessary for this patient for a duration of greater than two midnights. See H&P and MD Progress Notes for additional information about the patient's course of treatment.         No chief complaint on file.      Initial Presentation: 64 y.o. male presented to [facility] Emergency Department,transferred to [facility] Cardiac ICU as inpatient for penetrating atherosclerotic ulcer of aorta.PMHx of rectal cancer s/p LAR in 2016 who presents to OSH for new onset RLE weakness. Patient states weakness began suddenly around noon today, not associated with any numbness/tingling, states it was a heavyness that prevented him from being able to walk.  CT/CTA was negative for acute LVO but possible subacute/chronic infarcts were seen on CTH. CTA head/neck did identify abnormality of the aortic arch and a dedicated CTA C/A/P showed a AZ with a focal contained rupture. The patient's initial SBP was in the 180's, he was started on a nicardipine gtt and transferred to [facility] for CTS consultation and higher level of care. On arrival, the patient currently has no complaints. He states that his R leg is better but still feels heavy. He denies any chest pain, sob, FREITAS, N/V, Abd pain, lightheadedness, or dizziness. He states that he does not see a doctor on a regular basis. He has smoked 0.5 PPD for \"my whole life.\" He states " "that he drinks a case of beer over the weekend but does not drink during the week. On exam  Palpable PT/DP bilateral LE.  B/l trace lower leg edema, ill appearing (+) wheezing Plan IV Cardizem gtt atorvastatin, continuous cardio-pulmonary and pulse ox neuro vascular checks q 1 hr and supportive care      Date: 10-30-24   Day 2: remains on continuous cardizem gtt neuro vascular checks q1 hr, (+) RLE weakness remains ill appearing, wheezing trace b/l lower leg edema continuous cardio-pulmonary and pulse ox medications carvedilo ASA/plavix until cleared by CTS   Neurology consult:  Etiology for transient right lower extremity weakness remains unclear, but cannot fully rule out TIA/stroke.  Patient continues to have subjective tingling in the right foot, stating \"it doesn't work right.\"  He does have vibratory sensory loss in bilateral distal lower extremities and brisk reflexes throughout, but no objective weakness.   Plan  MRI brain wo contrast pending  Echo pending  Lipid Panel: LDL 61, Cholesterol 123  Hemoglobin A1c pending  S/p aspirin 325 mg and Plavix 300 mg once  At this time, aspirin/Plavix on hold until cleared by CT surgery  Patient states he was taking aspirin 81 mg only Monday-Friday, but not on the weekends  Start Atorvastatin 80 mg daily (statin naive PTA)  Ideally recommend permissive HTN, but given penetrating atherosclerotic ulcer with rupture, will defer BP goal to CT surgery.  Avoid hypotension  Euglycemic, normothermic goal  Continue telemetry  PT/OT/ST  Stroke education  Frequent neuro checks. Continue to monitor and notify neurology with any changes.  STAT CT head for any acute change in neuro exam    Cardiac Surgery consult:  AZ aortic arch w/ concern for posterior stranding     Plan:  Medical Therapy at this time. See surgeon attestation for further details. No concern for aortic rupture at this time or immediate plans for need to go to the operating room.  Repeat imaging in the near future " unless worsening neurological exam.  HTN control with BP close to 120/80 and MAP 60s.  Case discussed w/ CC.  Education was provided in regards to the followin.)  Maintaining good blood pressure control and taking antihypertensive medications as prescribed.  2.)  No strenuous activity involving lifting more than 50lbs.  3.)  Awareness of the warning symptoms of aortic dissection such as chest pain, back pain, shortness of breath, lightheadedness or dizziness and to seek immediate medical attention at the nearest ER.  4.)  The importance of continued surveillance with visits in the Aortic Disease clinic and obtaining CT Scans as recommended.  5.) Importance of avoiding tobacco products.         Scheduled Medications:  atorvastatin, 80 mg, Oral, Daily With Dinner  carvedilol, 12.5 mg, Oral, BID With Meals  chlorhexidine, 15 mL, Mouth/Throat, Q12H SHEA  heparin (porcine), 5,000 Units, Subcutaneous, Q8H SHEA  thiamine, 100 mg, Oral, Daily      Continuous IV Infusions:  niCARdipine, 1-15 mg/hr, Intravenous, Titrated      PRN Meds:  labetalol, 10 mg, Intravenous, Q4H PRN      Admitting Vitals [10/29/24 1944]   Temperature Pulse Respirations Blood Pressure SpO2 Pain Score   98.3 °F (36.8 °C) 70 (!) 25 138/67 93 % No Pain     Weight (last 2 days)       Date/Time Weight    10/30/24 0850 90.2 (198.86)    10/30/24 0535 90.2 (198.86)    10/29/24 194 90.1 (198.63)            Vital Signs (last 3 days)       Date/Time Temp Pulse Resp BP MAP (mmHg) SpO2 O2 Device Patient Position - Orthostatic VS [place] Coma Scale Score CIWA-Ar Total Pain    10/30/24 1140 -- 70 -- 107/54 -- -- -- -- -- -- --    10/30/24 1100 -- -- -- -- -- -- -- -- 15 -- --    10/30/24 1000 -- 63 16 124/58 83 94 % -- -- 15 -- --    10/30/24 0900 -- 65 20 119/57 82 92 % -- -- 15 -- --    10/30/24 0850 -- 65 -- 119/63 -- -- -- -- -- -- --    10/30/24 0810 -- 65 -- 119 -- -- -- -- -- -- --    10/30/24 0800 97.8 °F (36.6 °C) 65 20 108/56 79 94 % None (Room air)  Lying 15 -- No Pain    10/30/24 0700 -- 64 16 125/60 86 94 % -- -- -- -- --    10/30/24 0630 -- 63 20 116/59 83 95 % -- -- -- -- --    10/30/24 0600 -- 63 16 128/65 92 96 % -- -- -- -- --    10/30/24 0500 98.5 °F (36.9 °C) 75 22 118/65 85 97 % -- -- -- -- --    10/30/24 0400 -- 64 16 128/60 87 95 % -- -- 15 4 --    10/30/24 0300 -- 69 22 104/61 77 95 % -- -- -- -- --    10/30/24 0230 -- 69 18 115/56 80 95 % -- -- -- -- --    10/30/24 0200 -- 65 20 130/67 93 94 % -- -- -- -- --    10/30/24 0100 98 °F (36.7 °C) 67 23 121/58 84 94 % -- -- -- -- --    10/30/24 0030 -- -- -- -- -- -- -- -- 15 -- --    10/30/24 0000 -- 64 17 107/54 78 95 % -- -- -- -- --    10/29/24 2300 -- 65 17 104/53 77 94 % -- -- -- -- --    10/29/24 2200 -- 70 25 110/54 78 93 % -- -- -- -- --    10/29/24 2100 -- 71 24 113/58 83 94 % -- -- -- -- --    10/29/24 2030 -- -- -- -- -- -- -- -- 15 -- --    10/29/24 2000 -- 70 29 120/62 85 93 % -- -- -- -- --    10/29/24 1944 98.3 °F (36.8 °C) 70 25 138/67 96 93 % -- -- -- -- No Pain           CIWA-Ar Score       Row Name 10/30/24 0400             CIWA-Ar    Nausea and Vomiting 0      Tactile Disturbances 0      Tremor 0      Auditory Disturbances 0      Paroxysmal Sweats 4      Visual Disturbances 0      Anxiety 0      Headache, Fullness in Head 0      Agitation 0      Orientation and Clouding of Sensorium 0      CIWA-Ar Total 4                      Pertinent Labs/Diagnostic Test Results:   Radiology:  MRI inpatient order    (Results Pending)     Cardiology:  Echo complete w/ contrast if indicated   Final Result by Ronan Rivera MD (10/30 7448)        Left Ventricle: Left ventricular cavity size is normal. Wall thickness    is moderately increased. There is mild concentric hypertrophy. The left    ventricular ejection fraction is 65%. Systolic function is normal. Wall    motion is normal. Diastolic function is mildly abnormal, consistent with    grade I (abnormal) relaxation.     Aorta: The aortic root  is moderately dilated. The ascending aorta is    normal in size. The aortic root is 4.50 cm. The ascending aorta is 3.7 cm.         ECG 12 lead   Final Result by Steven Kincaid MD (10/29 2112)   Normal sinus rhythm   ST & T wave abnormality, consider inferior ischemia   ST & T wave abnormality, consider anterolateral ischemia   Abnormal ECG   When compared with ECG of 29-OCT-2024 19:38, (unconfirmed)   No significant change was found   Confirmed by Steven Kincaid ([id number]) on 10/29/2024 9:12:47 PM      ECG 12 lead   Final Result by Steven Kincaid MD (10/29 2115)   Normal sinus rhythm   Cannot rule out Inferior infarct , age undetermined   ST & T wave abnormality, consider anterolateral ischemia   Abnormal ECG   When compared with ECG of 29-OCT-2024 14:09,   No significant change was found   Confirmed by Steven Kincaid ([id number]) on 10/29/2024 9:15:28 PM        GI:  No orders to display           Results from last 7 days   Lab Units 10/30/24  0447 10/29/24  1430   WBC Thousand/uL 5.41 5.12   HEMOGLOBIN g/dL 13.1 14.1   HEMATOCRIT % 37.6 41.2   PLATELETS Thousands/uL 182 207         Results from last 7 days   Lab Units 10/30/24  0447 10/29/24  1430   SODIUM mmol/L 135 136   POTASSIUM mmol/L 4.0 4.1   CHLORIDE mmol/L 104 102   CO2 mmol/L 21 24   ANION GAP mmol/L 10 10   BUN mg/dL 13 18   CREATININE mg/dL 1.01 1.59*   EGFR ml/min/1.73sq m 78 45   CALCIUM mg/dL 8.4 9.1   CALCIUM, IONIZED mmol/L 1.10*  --    MAGNESIUM mg/dL 2.1  --    PHOSPHORUS mg/dL 3.5  --      Results from last 7 days   Lab Units 10/30/24  0447 10/29/24  1430   AST U/L 22 27   ALT U/L 16 21   ALK PHOS U/L 52 52   TOTAL PROTEIN g/dL 6.5 7.3   ALBUMIN g/dL 3.9 4.3   TOTAL BILIRUBIN mg/dL 1.14* 1.17*     Results from last 7 days   Lab Units 10/30/24  0726 10/30/24  0446 10/29/24  1432   POC GLUCOSE mg/dl 106 116 132     Results from last 7 days   Lab Units 10/30/24  0447 10/29/24  1430   GLUCOSE RANDOM mg/dL 110 130         Results from last 7  days   Lab Units 10/30/24  0447   HEMOGLOBIN A1C % 5.5   EAG mg/dl 111     Results from last 7 days   Lab Units 10/29/24  1849 10/29/24  1645 10/29/24  1430   HS TNI 0HR ng/L  --   --  11   HS TNI 2HR ng/L  --  12  --    HSTNI D2 ng/L  --  1  --    HS TNI 4HR ng/L 10  --   --    HSTNI D4 ng/L -1  --   --      Results from last 7 days   Lab Units 10/30/24  0447 10/29/24  1430   PROTIME seconds 15.0 15.7*   INR  1.15 1.20*   PTT seconds  --  26     Past Medical History:   Diagnosis Date    Alcohol use     no known liver dysfunction    Carotid stenosis, asymptomatic, right     40-50% JOSE at bifurcation/bulb    Chronic bronchitis (HCC)     Current tobacco use     History of CVA (cerebrovascular accident)     thalmic, right occipital, b/l basal ganglia    History of rectal cancer     s/p resection    Obesity     Pulmonary nodules     right apical/RUL     Present on Admission:   Penetrating atherosclerotic ulcer of aorta (HCC)   Right leg weakness   Tobacco abuse   Alcohol abuse      Admitting Diagnosis: Weakness of right lower extremity [R29.898]  Age/Sex: 64 y.o. male    Network Utilization Review Department  ATTENTION: Please call with any questions or concerns to [phone] and carefully listen to the prompts so that you are directed to the right person. All voicemails are confidential.   For Discharge needs, contact Care Management DC Support Team at [phone] opt. [phone]  Send all requests for admission clinical reviews, approved or denied determinations and any other requests to dedicated fax number below belonging to the campus where the patient is receiving treatment. List of dedicated fax numbers for the Facilities:  FACILITY NAME UR FAX NUMBER   ADMISSION DENIALS (Administrative/Medical Necessity) [phone]   DISCHARGE SUPPORT TEAM (NETWORK) [phone]   PARENT CHILD HEALTH (Maternity/NICU/Pediatrics) [phone]   [facility] [phone]   [facility]  [place] [phone]   [facility] [phone]   [facility] [phone]   [facility] [phone]   [facility] [phone]   [facility] [phone]   [facility] [phone]   [facility] [phone]   [facility] [phone]   [facility] [phone]   [facility] [phone]

## 2024-10-30 NOTE — PROGRESS NOTES
"Progress Note - Neurology   Name: Vlad Gregorio 64 y.o. male I MRN: [id number]  Unit/Bed#: [facility]-313-01 I Date of Admission: 10/29/2024   Date of Service: 10/30/2024 I Hospital Day: 1    Assessment & Plan  Right leg weakness  64 y.o. male with prior strokes/TIAs, history of colon cancer, tobacco use, and alcohol abuse who presented to [facility] on 10/29/2024 as a stroke alert for RLE weakness.  Patient now reports that it was R foot tingling.    Upon arrival to the ED, /83.    NIHSS 1 (orientation questions).  CT head:   New hypodense areas involving the bilateral basal ganglia, which may represent prior infarcts or worsening chronic microangiopathic changes, but cannot fully rule out recent infarcts.  Encephalomalacia involving the right occipital lobe related to prior infarct.  Old thalamic lacunar infarct.  CTA head/neck:  No LVO or significant stenosis  Irregularity/outpouching along the undersurface of the aortic arch concerning for penetrating atherosclerotic ulcer with associated rupture given adjacent mediastinal soft tissue density/hematoma  40 to 50% stenosis of the right cervical ICA due to atherosclerotic plaque at the right carotid bulb/bifurcation  Multiple pulmonary nodules involving the visualized right lung  CTA dissection protocol:  Again noted possible penetrating ulcer with focal contained rupture, but no active extravasation.  No aneurysm or dissection.  Patient was not a TNK candidate due to symptoms resolving.  Patient was loaded with aspirin 325 mg and Plavix 300 mg.  Patient was transferred to [facility] for cardiothoracic surgery evaluation.    Etiology for transient right lower extremity weakness remains unclear, but cannot fully rule out TIA/stroke.  Patient continues to have subjective tingling in the right foot, stating \"it doesn't work right.\"  He does have vibratory sensory loss in bilateral distal lower extremities and brisk reflexes throughout (however symmetric), but no " "objective weakness.    Plan:  - Stroke pathway  MRI brain wo contrast pending  Echo pending  Lipid Panel: LDL 61, Cholesterol 123  Hemoglobin A1c pending  S/p aspirin 325 mg and Plavix 300 mg once  At this time, aspirin/Plavix on hold until cleared by CT surgery  Patient states he was taking aspirin 81 mg only Monday-Friday, but not on the weekends  Start Atorvastatin 80 mg daily (statin naive PTA)  Ideally recommend permissive HTN, but given penetrating atherosclerotic ulcer with rupture, will defer BP goal to CT surgery.  Avoid hypotension  Euglycemic, normothermic goal  Continue telemetry  PT/OT/ST  Stroke education  Frequent neuro checks. Continue to monitor and notify neurology with any changes.  STAT CT head for any acute change in neuro exam  - Medical management and supportive care per primary team. Correction of any metabolic or infectious disturbances.   Penetrating atherosclerotic ulcer of aorta (HCC)  - Imaging showed contour abnormality in the undersurface of the posterior aortic arch consistent with penetrating ulcer with focal contained rupture.  No active extravasation, aneurysm, or dissection.  - Patient was transferred to [facility] for cardiothoracic surgery evaluation and possible intervention  - CT surgery consulted  - BP management per critical care  Tobacco abuse  - Counseled on smoking cessation  - Nicotine patch  Alcohol abuse  - Patient states he drinks a case of beers every weekend (roughly 30 beers per case)  - [facility] protocol  - Thiamine supplementation    Recommendations for outpatient neurological follow up have yet to be determined.      Subjective   Patient seen and examined at bedside.  Patient states that his right foot/leg still \"does not work right.\"  When asked to elaborate, he describes a tingling sensation in the right foot.  This is similar to yesterday.  No objective weakness in the right lower extremity.  Denies any headache, visual disturbances, numbness, chest pain, shortness of " breath, or abdominal pain.    Patient states that he smokes approximately half pack of cigarettes per day.  He takes aspirin only Monday through Friday, but not on the weekends.  He does not take any cholesterol medications at baseline.  He drinks approximately a case of beer every weekend.  He states that there are approximately 30 beers in each case.    Review of Systems   Neurological:  Positive for weakness and numbness.   All other systems reviewed and are negative.        Objective :  Temp:  [98 °F (36.7 °C)-99.1 °F (37.3 °C)] 98.5 °F (36.9 °C)  HR:  [63-79] 64  BP: (104-187)/() 125/60  Resp:  [16-29] 16  SpO2:  [93 %-98 %] 94 %  O2 Device: None (Room air)    Physical Exam  Vitals and nursing note reviewed.   Constitutional:       Appearance: Normal appearance.      Comments: Patient lying in bed in no acute distress   HENT:      Head: Normocephalic and atraumatic.      Mouth/Throat:      Mouth: Mucous membranes are moist.      Pharynx: Oropharynx is clear.   Eyes:      Extraocular Movements: Extraocular movements intact.      Conjunctiva/sclera: Conjunctivae normal.      Pupils: Pupils are equal, round, and reactive to light.   Pulmonary:      Effort: Pulmonary effort is normal.   Musculoskeletal:         General: Normal range of motion.   Skin:     General: Skin is warm and dry.   Neurological:      Mental Status: He is alert.      Deep Tendon Reflexes:      Reflex Scores:       Bicep reflexes are 3+ on the right side and 3+ on the left side.       Brachioradialis reflexes are 3+ on the right side and 3+ on the left side.       Patellar reflexes are 3+ on the right side and 3+ on the left side.    Neurological Exam  Mental Status  Alert.  Patient awake and alert.  Oriented to person, place, month, and year.  No dysarthria or aphasia.  Able to follow simple midline and appendicular commands..    Cranial Nerves  CN III, IV, VI: Extraocular movements intact bilaterally. Pupils equal round and reactive to  light bilaterally.  Pupils 3 mm, round, reactive to light bilaterally.  EOMs intact without nystagmus.  No visual field deficits.  Facial sensation to light touch intact throughout.  Facial expressions full and symmetric.  Tongue midline.  Unable to visualize soft palate  Hearing grossly intact..    Motor  Normal muscle bulk throughout. Normal muscle tone. No pronator drift.  5/5 strength in bilateral upper and lower extremities..    Sensory  Sensation to light touch and temperature intact throughout.  Absent vibration bilateral great toes, absent vibration left medial malleoli, diminished vibration right medial malleoli, and normal vibratory sense bilateral patellas  Decreased pinprick distal to bilateral shoulders and distal to bilateral patellas  Normal proprioception in the hands and toes.    Reflexes                                            Right                      Left  Brachioradialis                    3+                         3+  Biceps                                 3+                         3+  Patellar                                3+                         3+  No resting or action tremor  No ankle clonus bilaterally.  Downgoing toes bilaterally.    Coordination    No ataxia with finger to nose bilateerally.    Gait    Deferred for patient's safety.        Lab Results: I have reviewed the following results:  Imaging Results Review: I personally reviewed the following image studies in PACS and associated radiology reports: CT head, CTA head/neck, CTA dissection protocol. My interpretation of the radiology images/reports is: as above.      VTE Pharmacologic Prophylaxis: VTE covered by:  heparin (porcine), Subcutaneous, 5,000 Units at 10/30/24 0533    and Sequential compression device (Venodyne)

## 2024-10-30 NOTE — DISCHARGE INSTRUCTIONS
Education was provided in regards to the followin.)  Maintaining good blood pressure control and taking antihypertensive medications as prescribed.  2.)  No strenuous activity involving lifting more than 50lbs.  3.)  Awareness of the warning symptoms of aortic dissection such as chest pain, back pain, shortness of breath, lightheadedness or dizziness and to seek immediate medical attention at the nearest ER.  4.)  The importance of continued surveillance with visits in the Aortic Disease clinic and obtaining CT Scans as recommended.  5.) Importance of avoiding tobacco products.

## 2024-10-30 NOTE — CASE MANAGEMENT
Case Management Assessment    Patient name Vlad Gregorio  [facility] [id number]  : 1960 Date 10/30/2024       Current Admission Date: 10/29/2024  Current Admission Diagnosis:Penetrating atherosclerotic ulcer of aorta (HCC)   Patient Active Problem List    Diagnosis Date Noted Date Diagnosed    Right leg weakness 10/29/2024     Alcohol abuse 10/29/2024     Penetrating atherosclerotic ulcer of aorta (HCC) 10/29/2024     Syncope and collapse 2018     Tobacco abuse 2018       LOS (days): 1  Geometric Mean LOS (GMLOS) (days): 2.1  Days to GMLOS:1.3     OBJECTIVE:  Risk of Unplanned Readmission Score: 8.94    Current admission status: Inpatient     Preferred Pharmacy:   UNKNOWN - FOLLOW UP PRIOR TO DISCHARGE TO E-PRESCRIBE  No address on file    Primary Care Provider: Arthur Watts MD    Primary Insurance: [employer]  Secondary Insurance:     ASSESSMENT:  Active Health Care Proxies    There are no active Health Care Proxies on file.        Patient Information  Admitted from:: Home  Mental Status: Alert  During Assessment patient was accompanied by: Daughter, Son  Assessment information provided by:: Patient  Support Systems: Self, Son, Daughter  County of Residence: [place]  What city do you live in?: [place]  Home entry access options. Select all that apply.: Stairs  Type of Current Residence: 2 story home  Living Arrangements: Lives Alone  Is patient a ?: No    Activities of Daily Living Prior to Admission  Functional Status: Independent  Completes ADLs independently?: Yes  Ambulates independently?: Yes  Does patient use assisted devices?: No  Does patient currently own DME?: No  Does the patient have a history of Short-Term Rehab?: No  Does patient have a history of HHC?: No  Does patient currently have HHC?: No     Patient Information Continued  Income Source: Employed (full-time at [employer])  Does patient have prescription coverage?:  Yes  Does patient receive dialysis treatments?: No    Means of Transportation  Means of Transport to Appts:: Drives Self    Social Determinants of Health (SDOH)      Flowsheet Row Most Recent Value   Housing Stability    In the last 12 months, was there a time when you were not able to pay the mortgage or rent on time? N   In the past 12 months, how many times have you moved where you were living? 0   At any time in the past 12 months, were you homeless or living in a shelter (including now)? N   Transportation Needs    In the past 12 months, has lack of transportation kept you from medical appointments or from getting medications? no   In the past 12 months, has lack of transportation kept you from meetings, work, or from getting things needed for daily living? No   Food Insecurity    Within the past 12 months, you worried that your food would run out before you got the money to buy more. Never true   Within the past 12 months, the food you bought just didn't last and you didn't have money to get more. Never true   Utilities    In the past 12 months has the electric, gas, oil, or water company threatened to shut off services in your home? No

## 2024-10-30 NOTE — H&P
H&P - Critical Care/ICU   Name: Vlad Gregorio 64 y.o. male I MRN: [id number]  Unit/Bed#: [facility]-313-01 I Date of Admission: 10/29/2024   Date of Service: 10/29/2024 I Hospital Day: 0       Assessment & Plan  Penetrating atherosclerotic ulcer of aorta (HCC)  Presented to [facility] on 10/29 with R leg weakness   Found to have AZ on imaging with contained rupture  CTA C/A/P 10/29: Again noted along the undersurface of the posterior aortic arch is a contour abnormality in the wall as seen on images 605/93 and 606/103 with surrounding fluid most consistent with penetrating ulcer with focal contained rupture. No active extravasation on the current exam and no worsening since the study from 2 hours prior. No aneurysm or dissection  Hypertensive on arrival - initiated on labetalol gtt  Risk factors:  Likely undiagnosed HTN/HLD  Life-long smoker  ETOH use    Plan:  CTS consult  SBP goal <120mmHg  Cardene gtt currently at 10mg/hr  Will initiate 25mg lopressor PO BID  Q1hr Neurovasc checks  Timing for repeat imaging per CTS  Right leg weakness  Initially presented with acute RLE weakness - stroke alert negative for LVO, no TNK given s/s improvement in RLE symptoms  CTH/CTA 10/29:  New hypodense areas involving the bilateral basal ganglia, which may be related to prior infarcts, or worsening chronic microangiopathic changes, but more recent infarcts in these regions cannot be excluded. Recommend further evaluation with brain MRI without contrast.   Encephalomalacia involving the right occipital lobe related to prior infarct. Old thalamic lacunar infarct again demonstrated.  Negative for LVO  Loaded with asa/plavix    Plan:  Unclear is symptoms are related to AZ/PAD vs previous/Acute on chronic CVA's  Appreciate neuro consult  Hold ASA/plavix until cleared by CTS  Stroke pathway  MRI brain w/o  TTE echo  Lipis panel  A1C  Start high dose statin  Disposition: Stepdown Level 1    History of Present Illness   Vlad Gregorio is a 64 y.o.  "with a PMHx of rectal cancer s/p LAR in 2016 who presents to OSH for new onset RLE weakness. Patient states weakness began suddenly around noon today, not associated with any numbness/tingling, states it was a heavyness that prevented him from being able to walk. Symptoms were getting worse so patient presented to ED as a stroke alert. CT/CTA was negative for acute LVO but possible subacute/chronic infarcts were seen on CTH. CTA head/neck did identify abnormality of the aortic arch and a dedicated CTA C/A/P showed a AZ with a focal contained rupture. The patient's initial SBP was in the 180's, he was started on a nicardipine gtt and transferred to [facility] for CTS consultation and higher level of care.     On arrival, the patient currently has no complaints. He states that his R leg is better but still feels heavy. He denies any chest pain, sob, FREITAS, N/V, Abd pain, lightheadedness, or dizziness. He states that he does not see a doctor on a regular basis. He has smoked 0.5 PPD for \"my whole life.\" He states that he drinks a case of beer over the weekend but does not drink during the week. He states he would like to be a full code and his daughter would be his decision maker should he be unable to make his decisions.     History obtained from chart review and the patient.  Review of Systems: See HPI for Review of Systems    Historical Information   Past Medical History:  No date: Cancer (HCC) Past Surgical History:  No date: RECTAL BIOPSY  No date: RECTAL SURGERY   Current Outpatient Medications   Medication Instructions    magnesium oxide (MAG-OX) 400 mg, Oral, Daily    nicotine (NICODERM CQ) 14 mg/24hr TD 24 hr patch 1 patch, Transdermal, Daily    No Known Allergies   Social History     Tobacco Use    Smoking status: Every Day     Current packs/day: 0.50     Average packs/day: 0.5 packs/day for 49.8 years (24.9 ttl pk-yrs)     Types: Cigarettes     Start date: 1975    Smokeless tobacco: Never   Substance Use Topics    " Alcohol use: Yes     Comment: states about 2 dozen between every saturday and sunday    Drug use: No    History reviewed. No pertinent family history.       Objective :                   Vitals I/O      Most Recent Min/Max in 24hrs   Temp 98.3 °F (36.8 °C) Temp  Min: 98.3 °F (36.8 °C)  Max: 99.1 °F (37.3 °C)   Pulse 70 Pulse  Min: 70  Max: 79   Resp (!) 29 Resp  Min: 17  Max: 29   /62 BP  Min: 106/58  Max: 187/95   O2 Sat 93 % SpO2  Min: 93 %  Max: 98 %    No intake or output data in the 24 hours ending 10/29/24 2150    Diet Regular; Regular House    Invasive Monitoring           Physical Exam   Physical Exam  Vitals and nursing note reviewed.   Eyes:      Extraocular Movements: Extraocular movements intact.      Pupils: Pupils are equal, round, and reactive to light.   Skin:     General: Skin is warm and dry.   HENT:      Head: Normocephalic and atraumatic.   Neck:      Vascular: No JVD.   Cardiovascular:      Rate and Rhythm: Normal rate and regular rhythm.      Pulses: Normal pulses. Pulses are Palpable PT/DP bilateral LE.      Heart sounds: Normal heart sounds.   Musculoskeletal:      Right lower leg: Trace Edema present.      Left lower leg: Trace Edema present.   Abdominal:      Palpations: Abdomen is soft.      Tenderness: There is no abdominal tenderness. There is no guarding.   Constitutional:       General: He is not in acute distress.     Appearance: He is ill-appearing. He is not toxic-appearing.   Pulmonary:      Effort: Pulmonary effort is normal. No respiratory distress.      Breath sounds: No stridor. Wheezing present. No rhonchi or rales.   Neurological:      General: No focal deficit present.      Mental Status: He is alert and oriented to person, place and time.      Motor: Strength full and intact in all extremities.          Diagnostic Studies        Lab Results: I have reviewed the following results:     Medications:  Scheduled PRN   [START ON 10/30/2024] atorvastatin, 80 mg, Daily With  Dinner  carvedilol, 6.25 mg, BID With Meals  chlorhexidine, 15 mL, Q12H SHEA      labetalol, 10 mg, Q4H PRN       Continuous    niCARdipine, 1-15 mg/hr, Last Rate: 10 mg/hr (10/29/24 2043)         Labs:   CBC    Recent Labs     10/29/24  1430   WBC 5.12   HGB 14.1   HCT 41.2        BMP    Recent Labs     10/29/24  1430   SODIUM 136   K 4.1      CO2 24   AGAP 10   BUN 18   CREATININE 1.59*   CALCIUM 9.1       Coags    Recent Labs     10/29/24  1430   INR 1.20*   PTT 26        Additional Electrolytes  No recent results       Blood Gas    No recent results  No recent results LFTs  Recent Labs     10/29/24  1430   ALT 21   AST 27   ALKPHOS 52   ALB 4.3   TBILI 1.17*       Infectious  No recent results  Glucose  Recent Labs     10/29/24  1430   GLUC 130

## 2024-10-30 NOTE — ASSESSMENT & PLAN NOTE
Initially presented with acute RLE weakness - stroke alert negative for LVO, no TNK given s/s improvement in RLE symptoms  CTH/CTA 10/29:  New hypodense areas involving the bilateral basal ganglia, which may be related to prior infarcts, or worsening chronic microangiopathic changes, but more recent infarcts in these regions cannot be excluded. Recommend further evaluation with brain MRI without contrast.   Encephalomalacia involving the right occipital lobe related to prior infarct. Old thalamic lacunar infarct again demonstrated.  Negative for LVO  Loaded with asa/plavix    Plan:  Unclear is symptoms are related to AZ/PAD vs previous/Acute on chronic CVA's  Appreciate neuro consult  Hold ASA/plavix until cleared by CTS  Stroke pathway  MRI brain w/o  TTE echo  Lipis panel  A1C  Start high dose statin

## 2024-10-30 NOTE — PROGRESS NOTES
Progress Note - Critical Care/ICU   Name: Vlad Gregorio 64 y.o. male I MRN: [id number]  Unit/Bed#: [facility]-313-01 I Date of Admission: 10/29/2024   Date of Service: 10/30/2024 I Hospital Day: 1       Critical Care Interval Transfer Note:    Brief Hospital Summary:   Vlad Gregorio is a 64 y.o. with a PMHx of rectal cancer s/p LAR in 2016 who presents to OSH for new onset RLE weakness. Patient presented to ED as a stroke alert. CT/CTA was negative for acute LVO but possible subacute/chronic infarcts were seen on CTH. CTA head/neck did identify abnormality of the aortic arch and a dedicated CTA C/A/P showed a possible AZ with a focal contained rupture. The patient's initial SBP was in the 180's, he was started on a nicardipine gtt and transferred to [facility] for CTS consultation and higher level of care. Patient arrived and was placed on Cardene and coreg. Cardene was successfully weaned off with coreg with BP < 120. Patient was seen by CTSx and deemed to not have an acute aortic process.  Patient was seen by Neurology and placed on stroke pathway. Patient stable for transfer to /S for completion of workup.     Barriers to discharge:   Outpatient follow-up with CTSx  Neurology- Stroke pathway.      Consults: IP CONSULT TO CASE MANAGEMENT  IP CONSULT TO CARDIOTHORACIC SURGERY    Patient seen and evaluated by Critical Care today and deemed to be appropriate for transfer to [facility]. Spoke to Dr. Toney from [facility] to accept transfer. Critical care can be contacted via SecureChat with any questions or concerns.

## 2024-10-30 NOTE — ASSESSMENT & PLAN NOTE
"64 y.o. male with prior strokes/TIAs, history of colon cancer, tobacco use, and alcohol abuse who presented to [facility] on 10/29/2024 as a stroke alert for RLE weakness.  Patient now reports that it was R foot tingling.    Upon arrival to the ED, /83.    NIHSS 1 (orientation questions).  CT head:   New hypodense areas involving the bilateral basal ganglia, which may represent prior infarcts or worsening chronic microangiopathic changes, but cannot fully rule out recent infarcts.  Encephalomalacia involving the right occipital lobe related to prior infarct.  Old thalamic lacunar infarct.  CTA head/neck:  No LVO or significant stenosis  Irregularity/outpouching along the undersurface of the aortic arch concerning for penetrating atherosclerotic ulcer with associated rupture given adjacent mediastinal soft tissue density/hematoma  40 to 50% stenosis of the right cervical ICA due to atherosclerotic plaque at the right carotid bulb/bifurcation  Multiple pulmonary nodules involving the visualized right lung  CTA dissection protocol:  Again noted possible penetrating ulcer with focal contained rupture, but no active extravasation.  No aneurysm or dissection.  Patient was not a TNK candidate due to symptoms resolving.  Patient was loaded with aspirin 325 mg and Plavix 300 mg.  Patient was transferred to hospitals for cardiothoracic surgery evaluation.    Etiology for transient right lower extremity weakness remains unclear, but cannot fully rule out TIA/stroke.  Patient continues to have subjective tingling in the right foot, stating \"it doesn't work right.\"  He does have vibratory sensory loss in bilateral distal lower extremities and brisk reflexes throughout (however symmetric), but no objective weakness.    Plan:  - Stroke pathway  MRI brain wo contrast pending  Echo pending  Lipid Panel: LDL 61, Cholesterol 123  Hemoglobin A1c pending  S/p aspirin 325 mg and Plavix 300 mg once  At this time, aspirin/Plavix on " hold until cleared by CT surgery  Patient states he was taking aspirin 81 mg only Monday-Friday, but not on the weekends  Start Atorvastatin 80 mg daily (statin naive PTA)  Ideally recommend permissive HTN, but given penetrating atherosclerotic ulcer with rupture, will defer BP goal to CT surgery.  Avoid hypotension  Euglycemic, normothermic goal  Continue telemetry  PT/OT/ST  Stroke education  Frequent neuro checks. Continue to monitor and notify neurology with any changes.  STAT CT head for any acute change in neuro exam  - Medical management and supportive care per primary team. Correction of any metabolic or infectious disturbances.

## 2024-10-30 NOTE — ASSESSMENT & PLAN NOTE
Presented to [facility] on 10/29 with R leg weakness   Found to have AZ on imaging with contained rupture  CTA C/A/P 10/29: Again noted along the undersurface of the posterior aortic arch is a contour abnormality in the wall as seen on images 605/93 and 606/103 with surrounding fluid most consistent with penetrating ulcer with focal contained rupture. No active extravasation on the current exam and no worsening since the study from 2 hours prior. No aneurysm or dissection  Hypertensive on arrival - initiated on labetalol gtt  Risk factors:  Likely undiagnosed HTN/HLD  Life-long smoker  ETOH use    Plan:  CTS consult  SBP goal <120mmHg  Cardene gtt currently at 10mg/hr  Will initiate 25mg lopressor PO BID  Q1hr Neurovasc checks  Timing for repeat imaging per CTS

## 2024-10-30 NOTE — ASSESSMENT & PLAN NOTE
- Patient states he drinks a case of beers every weekend (roughly 30 beers per case)  - [facility] protocol  - Thiamine supplementation

## 2024-10-30 NOTE — ASSESSMENT & PLAN NOTE
Presented to [facility] on 10/29 with R leg weakness   Found to have AZ on imaging with contained rupture  CTA C/A/P 10/29: Again noted along the undersurface of the posterior aortic arch is a contour abnormality in the wall as seen on images 605/93 and 606/103 with surrounding fluid most consistent with penetrating ulcer with focal contained rupture. No active extravasation on the current exam and no worsening since the study from 2 hours prior. No aneurysm or dissection  Hypertensive on arrival - initiated on labetalol gtt  Risk factors:  Likely undiagnosed HTN/HLD  Life-long smoker  ETOH use    Plan:  CTS consult  SBP goal <120mmHg  Cardene gtt currently at 2.5mg/hr - wean to off  Started on carvedilol 6.25 overnight, continue as tolerated  Q1hr Neurovasc checks  Timing for repeat imaging per CTS

## 2024-10-30 NOTE — CONSULTS
Consultation - Cardiac Surgery   Vlad Gregorio 64 y.o. male MRN: [id number]  Unit/Bed#: [facility]-313-01 Encounter: [id number]    Physician Requesting Consult: Arabella Bello MD    Reason for Consult / Principal Problem: penetrating aortic ulcer posterior aortic arch    Inpatient consult to Cardiac Surgery  Consult performed by: Ana Rosa Goodman PA-C  Consult ordered by: Landon Martínez PA-C        History of Present Illness: Vlad Gregorio is a 64 y.o. male presents for evaluation of AZ posterior aortic arch w/ concern for aortic rupture. Patient does not received routine medical care PTA, states last time he saw a physician as 10+ years ago, denies past cardiac history. The patient states he was in his normal state of health until yesterday when he had sudden onset of RLE heaviness in his lateral lower right leg & into his lateral right dorsal surface of his footwhile at work being active/ambulatory. Stroke alert called, CT imaging revealed new hypodense areas in the b/l basal ganglia though imaging unsure if there are prior infarcts or recent ones. CTA head/neck w/ stroke alert revealed an aortic arch abnormality to which a f/u CTA dissection protocol was completed w/ concern for AZ w/ stranding of the posterior aortic arch. Dr. Mckeon (surgeon on call) made aware & had the patient transferred for stroke w/u as wella s cardiac sx consultation w/o immediate plans for the operating room. (+) Plavix 300mg x1 10/29.    Upon examination today, Mr. Gregorio reports he is feeling well. Admits to waxing & waning RLE paresthesias to which CC is aware, he has not walked yet to elicit issues w/ ambulation. Denies CP, palpitations, SOB, FREITAS, LE edema b/l, orthopnea, PND, numbness/tinlging/paresthesias in UE or LE bilaterally, HA, lightheadeness, dizziness, presyncopal symptoms, hx syncopal events, N/V/D, hemoptysis, hematemesis, hematochezia, melena. Denies hx stroke, hx cancer w/ chest wall radiation, hx blood loss  "anemia, hx varicose veins or vein stripping.    PMH includes current tobacco use, alcohol use (no liver dysfunction), h/o rectal ca (s/p resection), obesity (BMI 32.10), encephalomalacia, h/o CVA (thalamic lacunar infarct, right occipital infarct, b/l basal ganglia infarcts), JOSE stenosis (40-50% on CTA at bulb/bifurcation), chronic bronchitis, pulm nodules (right apical/RUL). PSH includes rectal bx, LAR for rectal cancer. Denies FH of CAD/MI, HTN, HL, valvular disease, DM, aortic aneurysms, or SCD. Patient is working as a , involves labor intensive activity & frequent movement & occasional heavy lifting (50lbs). 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 Has a son/daughter locally for support. Does not use an assist device for ambulation. Drives. (+) current tobacco use (1ppd since age 14 w/ recent wean to 1/2ppd w/o intention to quit). (+) ETOH abuse (1 case of beer over the weekend for \"man years\", no liver dysfunction, no withdrawal in house). (+) drug use (marijuana on the weekend w/ his beers). NKDA. Cardiac pertinent medications include: Coreg 12.5mg, cardene gtt at 10 during encounter. Other medications can be reviewed in the patient chart.    Patient does not receive routine medical care PTA.    Past Medical History:  Past Medical History:   Diagnosis Date    Cancer (HCC)      Past Surgical History:   Past Surgical History:   Procedure Laterality Date    RECTAL BIOPSY      RECTAL SURGERY       Family History:  History reviewed. No pertinent family history.    Social History:  Social History     Substance and Sexual Activity   Alcohol Use Yes    Comment: states about 2 dozen between every saturday and sunday     Social History     Substance and Sexual Activity   Drug Use No     Social History     Tobacco Use   Smoking Status Every Day    Current packs/day: 0.50    Average packs/day: 0.5 packs/day for 49.8 years (24.9 ttl pk-yrs)    Types: Cigarettes    Start date: 1975   Smokeless Tobacco Never "     Marital Status:     Home Medications:   Prior to Admission medications    Medication Sig Start Date End Date Taking? Authorizing Provider   magnesium oxide (MAG-OX) 400 mg Take 1 tablet (400 mg total) by mouth daily for 30 days 1/28/18 2/27/18  Christian Castro MD   nicotine (NICODERM CQ) 14 mg/24hr TD 24 hr patch Place 1 patch on the skin daily 1/28/18   Christian Castro MD       Inpatient Medications:  Scheduled Meds:   Current Facility-Administered Medications   Medication Dose Route Frequency Provider Last Rate    atorvastatin  80 mg Oral Daily With Dinner Landon Martínez PA-C      carvedilol  6.25 mg Oral BID With Meals Landon Martínez PA-C      chlorhexidine  15 mL Mouth/Throat Q12H [facility] Landon Martínez PA-C      heparin (porcine)  5,000 Units Subcutaneous Q8H [facility] Landon Martínez PA-C      labetalol  10 mg Intravenous Q4H PRN Landon Martínez PA-C      niCARdipine  1-15 mg/hr Intravenous Titrated Landon Martínez PA-C 10 mg/hr (10/30/24 0933)    thiamine  100 mg Oral Daily Geovanna Pace PA-C       Continuous Infusions: niCARdipine, 1-15 mg/hr, Last Rate: 10 mg/hr (10/30/24 0933)      PRN Meds:  labetalol, 10 mg, Q4H PRN        Allergies:  No Known Allergies    Review of Systems:  Review of Systems   Constitutional: Negative.  Negative for activity change, diaphoresis, fatigue and unexpected weight change.   HENT: Negative.     Respiratory: Negative.  Negative for chest tightness, shortness of breath and wheezing.    Cardiovascular: Negative.  Negative for chest pain, palpitations and leg swelling.   Gastrointestinal: Negative.  Negative for blood in stool, constipation, diarrhea, nausea and vomiting.   Genitourinary: Negative.    Musculoskeletal: Negative.  Negative for arthralgias, back pain, gait problem and myalgias.   Skin: Negative.    Neurological:  Negative for dizziness, syncope, weakness, light-headedness, numbness and headaches.   Hematological: Negative.  Does not bruise/bleed  "easily.   All other systems reviewed and are negative.  (+) RLE lateral leg into later dosral foot paresthesias    Vital Signs:     Vitals:    10/30/24 0630 10/30/24 0700 10/30/24 0810 10/30/24 0850   BP: 116/59 125/60 119/63 119/63   Pulse: 63 64 65 65   Resp: 20 16     Temp:       TempSrc:       SpO2: 95% 94%     Weight:    90.2 kg (198 lb 13.7 oz)   Height:    5' 6\" (1.676 m)     Invasive Devices       Peripheral Intravenous Line  Duration             Peripheral IV 10/29/24 Distal;Left;Upper;Ventral (anterior) Arm <1 day    Peripheral IV 10/29/24 Right Hand <1 day                    Physical Exam:  Physical Exam  Constitutional:       General: He is not in acute distress.     Appearance: Normal appearance. He is well-developed. He is obese. He is not ill-appearing or toxic-appearing.      Comments: Laying in bed in NAD, nails w/ dirt underneath, beard   HENT:      Head: Normocephalic and atraumatic.      Mouth/Throat:      Dentition: Normal dentition. Does not have dentures.      Pharynx: Uvula midline.   Neck:      Vascular: No carotid bruit or JVD.      Trachea: Trachea normal.   Cardiovascular:      Rate and Rhythm: Normal rate and regular rhythm.      Chest Wall: PMI is not displaced.      Pulses:           Radial pulses are 2+ on the right side and 2+ on the left side.        Dorsalis pedis pulses are 2+ on the right side and 2+ on the left side.      Heart sounds: S1 normal and S2 normal. No murmur heard.     No friction rub. No S3 or S4 sounds.      Comments: No heaves/lifts on palpation  Pulmonary:      Effort: Pulmonary effort is normal. No accessory muscle usage or respiratory distress.      Breath sounds: Normal breath sounds. No wheezing, rhonchi or rales.   Abdominal:      General: Bowel sounds are normal. There is no distension.      Palpations: Abdomen is not rigid. There is no mass.      Tenderness: There is no abdominal tenderness. There is no guarding or rebound.   Musculoskeletal:      Cervical " back: Normal range of motion and neck supple.   Skin:     General: Skin is warm and dry.      Findings: No bruising, petechiae or rash.      Nails: There is no clubbing.      Comments: Trace edema b/l LE, no varicosities appreciated to b/l LE   Neurological:      Mental Status: He is alert and oriented to person, place, and time.      Cranial Nerves: No cranial nerve deficit.      Sensory: No sensory deficit.      Comments: No focal deficit appreciated, (+) subjective paresthesias in RLE lateral leg into lateral dorsal surface of the foot         Lab Results:   Hb 13.1, plt 182, Cr 1.01, A1C 5.5, lipid pane WNL, PT/INT WNL, LFTs only t bili 1.14 otherwise WNL, troponin (-), PTT WNL  Results from last 7 days   Lab Units 10/30/24  0447 10/29/24  1430   WBC Thousand/uL 5.41 5.12   HEMOGLOBIN g/dL 13.1 14.1   HEMATOCRIT % 37.6 41.2   PLATELETS Thousands/uL 182 207     Results from last 7 days   Lab Units 10/30/24  0447 10/29/24  1430   POTASSIUM mmol/L 4.0 4.1   CHLORIDE mmol/L 104 102   CO2 mmol/L 21 24   BUN mg/dL 13 18   CREATININE mg/dL 1.01 1.59*   CALCIUM mg/dL 8.4 9.1     Results from last 7 days   Lab Units 10/30/24  0447 10/29/24  1430   INR  1.15 1.20*   PTT seconds  --  26     Lab Results   Component Value Date    HGBA1C 5.5 10/30/2024     Lab Results   Component Value Date    TROPONINI <0.02 01/27/2018       Imaging Studies:     TTE: EF 65%, grade 1 DD, root 4.5cm, ascending 3.7cm    CTA CAP:AZ w/ concern for coal contained rupture w/o extravasation, 7mm right apical pulm nodule, chornic bronchitis, soft tissue stranding surrounding left adrenal gland/ureter    CTA head/neck: irregularity/outpouching along undersurgace of aortic arch just distal to origin of left SCA concerning for ZA w/ associated rupture, 40-50% right cervical ICA stenosis, multiple pulm nodules (9mm apical, 8mm apical, 6mm PUL posteriorly)    CTH w/o: new hypodense areas involving b/l basal ganglia (infarcts), encephalomalacia involving  right occipital lobe related to prior infarct, old thalmic lacunar infarct, mild to mod chronic microangiopathic white matter changes    CXR: interstitial pulm edema    CT cervical/thoracic/lumbar spine: grade 1 anterolisthesis of L5-S1, chronic b/l spondylosis, mild scattered multilevel spondylosis w/o acute fracture, semisolid RUL pulm nodule, small secretions in RLL (aspiration), thickening of adrenal glands b/l (adenomatous hyperplasic), nonspecific perinephric & periureteral strandy densities on left w/o hydronephrosis    EKG: done    Assessment:  Principal Problem:    Penetrating atherosclerotic ulcer of aorta (HCC)  Active Problems:    Tobacco abuse    Right leg weakness    Alcohol abuse    AZ aortic arch w/ concern for posterior stranding    Plan:  Medical Therapy at this time. See surgeon attestation for further details. No concern for aortic rupture at this time or immediate plans for need to go to the operating room.  Repeat imaging in the near future unless worsening neurological exam.  HTN control with BP close to 120/80 and MAP 60s.  Case discussed w/ CC.    Education was provided in regards to the followin.)  Maintaining good blood pressure control and taking antihypertensive medications as prescribed.  2.)  No strenuous activity involving lifting more than 50lbs.  3.)  Awareness of the warning symptoms of aortic dissection such as chest pain, back pain, shortness of breath, lightheadedness or dizziness and to seek immediate medical attention at the nearest ER.  4.)  The importance of continued surveillance with visits in the Aortic Disease clinic and obtaining CT Scans as recommended.  5.) Importance of avoiding tobacco products.     Vlad Gregorio was comfortable with our recommendations, and their questions were answered to their satisfaction.  We will continue to evaluate the patient daily with further recommendations as work up is completed.  Thank you for allowing us to participate in the  care of this patient.     SIGNATURE: Ana Rosa Goodman PA-C  DATE: October 30, 2024  TIME: 9:56 AM    * This note was completed in part utilizing [employer] direct voice recognition software.   Grammatical errors, random word insertion, spelling mistakes, and incomplete sentences may be an occasional consequence of the system secondary to software limitations, ambient noise and hardware issues. At the time of dictation, efforts were made to edit, clarify and /or correct errors. Please read the chart carefully and recognize, using context, where substitutions have occurred.  If you have any questions or concerns about the context, text or information contained within the body of this dictation, please contact myself, the provider, for further clarification.

## 2024-10-30 NOTE — ASSESSMENT & PLAN NOTE
- Imaging showed contour abnormality in the undersurface of the posterior aortic arch consistent with penetrating ulcer with focal contained rupture.  No active extravasation, aneurysm, or dissection.  - Patient was transferred to [facility] for cardiothoracic surgery evaluation and possible intervention  - CT surgery consulted  - BP management per critical care

## 2024-10-31 PROBLEM — N17.9 AKI (ACUTE KIDNEY INJURY) (HCC): Status: ACTIVE | Noted: 2024-10-31

## 2024-10-31 LAB
ALBUMIN SERPL BCG-MCNC: 3.8 G/DL (ref 3.5–5)
ALP SERPL-CCNC: 50 U/L (ref 34–104)
ALT SERPL W P-5'-P-CCNC: 14 U/L (ref 7–52)
ANION GAP SERPL CALCULATED.3IONS-SCNC: 7 MMOL/L (ref 4–13)
AST SERPL W P-5'-P-CCNC: 19 U/L (ref 13–39)
BILIRUB DIRECT SERPL-MCNC: 0.3 MG/DL (ref 0–0.2)
BILIRUB SERPL-MCNC: 1.05 MG/DL (ref 0.2–1)
BUN SERPL-MCNC: 14 MG/DL (ref 5–25)
CA-I BLD-SCNC: 1.16 MMOL/L (ref 1.12–1.32)
CALCIUM SERPL-MCNC: 8.4 MG/DL (ref 8.4–10.2)
CHLORIDE SERPL-SCNC: 103 MMOL/L (ref 96–108)
CO2 SERPL-SCNC: 25 MMOL/L (ref 21–32)
CREAT SERPL-MCNC: 0.94 MG/DL (ref 0.6–1.3)
ERYTHROCYTE [DISTWIDTH] IN BLOOD BY AUTOMATED COUNT: 13 % (ref 11.6–15.1)
GFR SERPL CREATININE-BSD FRML MDRD: 85 ML/MIN/1.73SQ M
GLUCOSE SERPL-MCNC: 96 MG/DL (ref 65–140)
HCT VFR BLD AUTO: 37.7 % (ref 36.5–49.3)
HGB BLD-MCNC: 12.7 G/DL (ref 12–17)
MAGNESIUM SERPL-MCNC: 2.1 MG/DL (ref 1.9–2.7)
MCH RBC QN AUTO: 29.1 PG (ref 26.8–34.3)
MCHC RBC AUTO-ENTMCNC: 33.7 G/DL (ref 31.4–37.4)
MCV RBC AUTO: 86 FL (ref 82–98)
PHOSPHATE SERPL-MCNC: 3 MG/DL (ref 2.3–4.1)
PLATELET # BLD AUTO: 175 THOUSANDS/UL (ref 149–390)
PMV BLD AUTO: 9.6 FL (ref 8.9–12.7)
POTASSIUM SERPL-SCNC: 4 MMOL/L (ref 3.5–5.3)
PROT SERPL-MCNC: 6.5 G/DL (ref 6.4–8.4)
RBC # BLD AUTO: 4.37 MILLION/UL (ref 3.88–5.62)
SODIUM SERPL-SCNC: 135 MMOL/L (ref 135–147)
WBC # BLD AUTO: 5.2 THOUSAND/UL (ref 4.31–10.16)

## 2024-10-31 PROCEDURE — 80076 HEPATIC FUNCTION PANEL: CPT | Performed by: PHYSICIAN ASSISTANT

## 2024-10-31 PROCEDURE — 80048 BASIC METABOLIC PNL TOTAL CA: CPT | Performed by: PHYSICIAN ASSISTANT

## 2024-10-31 PROCEDURE — 85027 COMPLETE CBC AUTOMATED: CPT | Performed by: PHYSICIAN ASSISTANT

## 2024-10-31 PROCEDURE — 84100 ASSAY OF PHOSPHORUS: CPT | Performed by: PHYSICIAN ASSISTANT

## 2024-10-31 PROCEDURE — 99232 SBSQ HOSP IP/OBS MODERATE 35: CPT | Performed by: STUDENT IN AN ORGANIZED HEALTH CARE EDUCATION/TRAINING PROGRAM

## 2024-10-31 PROCEDURE — 82330 ASSAY OF CALCIUM: CPT | Performed by: PHYSICIAN ASSISTANT

## 2024-10-31 PROCEDURE — 83735 ASSAY OF MAGNESIUM: CPT | Performed by: PHYSICIAN ASSISTANT

## 2024-10-31 RX ORDER — PHENOBARBITAL SODIUM 65 MG/ML
130 INJECTION, SOLUTION INTRAMUSCULAR; INTRAVENOUS ONCE
Status: COMPLETED | OUTPATIENT
Start: 2024-10-31 | End: 2024-10-31

## 2024-10-31 RX ORDER — MAGNESIUM SULFATE HEPTAHYDRATE 40 MG/ML
2 INJECTION, SOLUTION INTRAVENOUS ONCE
Status: COMPLETED | OUTPATIENT
Start: 2024-10-31 | End: 2024-10-31

## 2024-10-31 RX ORDER — HYDRALAZINE HYDROCHLORIDE 20 MG/ML
5 INJECTION INTRAMUSCULAR; INTRAVENOUS EVERY 6 HOURS PRN
Status: DISCONTINUED | OUTPATIENT
Start: 2024-10-31 | End: 2024-11-04

## 2024-10-31 RX ORDER — DEXMEDETOMIDINE HYDROCHLORIDE 4 UG/ML
.1-.7 INJECTION, SOLUTION INTRAVENOUS
Status: DISCONTINUED | OUTPATIENT
Start: 2024-10-31 | End: 2024-11-01

## 2024-10-31 RX ORDER — LABETALOL HYDROCHLORIDE 5 MG/ML
10 INJECTION, SOLUTION INTRAVENOUS EVERY 4 HOURS PRN
Status: DISCONTINUED | OUTPATIENT
Start: 2024-10-31 | End: 2024-11-06 | Stop reason: HOSPADM

## 2024-10-31 RX ORDER — HYDRALAZINE HYDROCHLORIDE 20 MG/ML
5 INJECTION INTRAMUSCULAR; INTRAVENOUS EVERY 6 HOURS PRN
Status: DISCONTINUED | OUTPATIENT
Start: 2024-10-31 | End: 2024-10-31

## 2024-10-31 RX ADMIN — HYDRALAZINE HYDROCHLORIDE 5 MG: 20 INJECTION, SOLUTION INTRAMUSCULAR; INTRAVENOUS at 17:04

## 2024-10-31 RX ADMIN — HEPARIN SODIUM 5000 UNITS: 5000 INJECTION INTRAVENOUS; SUBCUTANEOUS at 05:59

## 2024-10-31 RX ADMIN — LABETALOL HYDROCHLORIDE 10 MG: 5 INJECTION, SOLUTION INTRAVENOUS at 22:13

## 2024-10-31 RX ADMIN — CHLORHEXIDINE GLUCONATE 0.12% ORAL RINSE 15 ML: 1.2 LIQUID ORAL at 20:10

## 2024-10-31 RX ADMIN — DEXMEDETOMIDINE HYDROCHLORIDE 0.2 MCG/KG/HR: 400 INJECTION INTRAVENOUS at 17:50

## 2024-10-31 RX ADMIN — NICARDIPINE HYDROCHLORIDE 2.5 MG/HR: 2.5 INJECTION, SOLUTION INTRAVENOUS at 19:38

## 2024-10-31 RX ADMIN — DEXMEDETOMIDINE HYDROCHLORIDE 0.7 MCG/KG/HR: 400 INJECTION INTRAVENOUS at 22:06

## 2024-10-31 RX ADMIN — CARVEDILOL 12.5 MG: 12.5 TABLET, FILM COATED ORAL at 07:24

## 2024-10-31 RX ADMIN — ASPIRIN 81 MG: 81 TABLET, COATED ORAL at 08:07

## 2024-10-31 RX ADMIN — HEPARIN SODIUM 5000 UNITS: 5000 INJECTION INTRAVENOUS; SUBCUTANEOUS at 22:05

## 2024-10-31 RX ADMIN — PHENOBARBITAL SODIUM 130 MG: 65 INJECTION INTRAMUSCULAR at 13:35

## 2024-10-31 RX ADMIN — PHENOBARBITAL SODIUM 130 MG: 65 INJECTION INTRAMUSCULAR at 07:21

## 2024-10-31 RX ADMIN — LABETALOL HYDROCHLORIDE 10 MG: 5 INJECTION, SOLUTION INTRAVENOUS at 13:29

## 2024-10-31 RX ADMIN — PHENOBARBITAL SODIUM 130 MG: 65 INJECTION INTRAMUSCULAR at 10:18

## 2024-10-31 RX ADMIN — MAGNESIUM SULFATE HEPTAHYDRATE 2 G: 40 INJECTION, SOLUTION INTRAVENOUS at 14:35

## 2024-10-31 RX ADMIN — LABETALOL HYDROCHLORIDE 10 MG: 5 INJECTION, SOLUTION INTRAVENOUS at 17:47

## 2024-10-31 RX ADMIN — PHENOBARBITAL SODIUM 130 MG: 65 INJECTION INTRAMUSCULAR at 08:07

## 2024-10-31 RX ADMIN — CLOPIDOGREL BISULFATE 75 MG: 75 TABLET, FILM COATED ORAL at 08:07

## 2024-10-31 RX ADMIN — THIAMINE HCL TAB 100 MG 100 MG: 100 TAB at 08:07

## 2024-10-31 RX ADMIN — HEPARIN SODIUM 5000 UNITS: 5000 INJECTION INTRAVENOUS; SUBCUTANEOUS at 13:19

## 2024-10-31 NOTE — ASSESSMENT & PLAN NOTE
"Patient presenting to the ED for lower extremity weakness, CT head demonstrates \"New hypodense areas involving the bilateral basal ganglia, which may be related to prior infarcts, or worsening chronic microangiopathic changes, but more recent infarcts in these regions cannot be excluded\"  Neuro following and we appreciate their expertise.  Patient agitated at this time with acute alcohol withdrawal and likely will be unable to complete a MRI at this time  "

## 2024-10-31 NOTE — ASSESSMENT & PLAN NOTE
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  No plans for surgical management at this time  Tight BP management recommended, there has been some difficulty throughout the day given patient's active alcohol withdrawal, he will be evaluated for possible admission to the critical care service where we can consider IV nicardipine

## 2024-10-31 NOTE — PROGRESS NOTES
Pt started to become impulsive, getting out of bed, confused and CIWA scored at 16. Notified SLIM PA and was ordered 4mg iv ativan. Pt became more agitated and ended up with 4 pts soft restraints, diaphoretic. Ordered Phenobarbital and given. Pt eventually calmed down and fell asleep. VS normal. Monitoring closely.

## 2024-10-31 NOTE — QUICK NOTE
Notified by nursing 2255 CIWA score 14 and SBP ~ 160s. On exam patient restless and disoriented. Initiated 10mg labetalol and 650mg phenobarbital. Reached out to critical care d/t concern for strict BP control close to 120/80 per CT surgery recs. Following phenobarbital, BP stabilized 120-130s systolic, patient sleeping and CIWA improved. Will continue to monitor overnight.

## 2024-10-31 NOTE — ASSESSMENT & PLAN NOTE
On admission patient reported drinking a case of beer over several days with a course of a week.  He appears to be going through withdrawals and has since admitted that he drinks about half a case of beer a day  CIWA's have been elevated and patient was given Ativan overnight with apparent paradoxical agitation.  He has had little improvement with multiple doses of IV Ativan.  Discussed with critical care and patient will be assessed for possible admission to the crit care service for Precedex infusion

## 2024-10-31 NOTE — CERTIFIED RECOVERY SPECIALIST
"   Certified  Note    Patient name: Vlad Gregorio  Location: [facility]-313/[facility]-313-01  [place]: [facility]  Attending:  Oliver Sewell MRN [id number]  : 1960  Age: 64 y.o.    Sex: male Date 10/31/2024         Substance Use History:     Social History     Substance and Sexual Activity   Alcohol Use Yes    Comment: 1 case of beer over the weekend for \"many years\"        Social History     Substance and Sexual Activity   Drug Use Yes    Types: Marijuana     CRS attempted to connect but when checking with nurses, was asked to \"wait another day or so due to patient being agitated and on 4-point restraints.\"    CRS provided contact info and resources on patient instructions in chart.    CRS team will continue to support through this admission.               '          Nu Wilcox       "

## 2024-10-31 NOTE — ASSESSMENT & PLAN NOTE
Likely ELZA on admission with creatinine 1.5 from apparent baseline around 1.0  Likely related to hypertension and improving.  Continue to monitor and repeat BMP in the morning

## 2024-10-31 NOTE — PROGRESS NOTES
"Progress Note - Hospitalist   Name: Vlad Gregorio 64 y.o. male I MRN: [id number]  Unit/Bed#: [facility]-313-01 I Date of Admission: 10/29/2024   Date of Service: 10/31/2024 I Hospital Day: 2    Assessment & Plan  Penetrating atherosclerotic ulcer of aorta (HCC)  CT concerning for penetrating aortic arch ulcer without evidence of extravasation or dissection  Patient has been evaluated by cardiothoracic surgery and we appreciate their expertise.  No plans for surgical management at this time  Tight BP management recommended, there has been some difficulty throughout the day given patient's active alcohol withdrawal, he will be evaluated for possible admission to the critical care service where we can consider IV nicardipine  Tobacco abuse  Consider Nicotine patch  Right leg weakness  Patient presenting to the ED for lower extremity weakness, CT head demonstrates \"New hypodense areas involving the bilateral basal ganglia, which may be related to prior infarcts, or worsening chronic microangiopathic changes, but more recent infarcts in these regions cannot be excluded\"  Neuro following and we appreciate their expertise.  Patient agitated at this time with acute alcohol withdrawal and likely will be unable to complete a MRI at this time  Alcohol abuse  On admission patient reported drinking a case of beer over several days with a course of a week.  He appears to be going through withdrawals and has since admitted that he drinks about half a case of beer a day  CIWA's have been elevated and patient was given Ativan overnight with apparent paradoxical agitation.  He has had little improvement with multiple doses of IV Ativan.  Discussed with critical care and patient will be assessed for possible admission to the crit care service for Precedex infusion  ELZA (acute kidney injury) (HCC)  Likely ELZA on admission with creatinine 1.5 from apparent baseline around 1.0  Likely related to hypertension and improving.  Continue to " monitor and repeat BMP in the morning    VTE Pharmacologic Prophylaxis:   Moderate Risk (Score 3-4) - Pharmacological DVT Prophylaxis Ordered: heparin.    Mobility:   Basic Mobility Inpatient Raw Score: 22  JH-HLM Goal: 7: Walk 25 feet or more  JH-HLM Achieved: 3: Sit at edge of bed  JH-HLM Goal NOT achieved. Continue with multidisciplinary rounding and encourage appropriate mobility to improve upon JH-HLM goals.    Patient Centered Rounds: I performed bedside rounds with nursing staff today.   Discussions with Specialists or Other Care Team Provider: Crit care    Education and Discussions with Family / Patient: Updated  (daughter) at bedside.    Current Length of Stay: 2 day(s)  Current Patient Status: Inpatient   Certification Statement: The patient will continue to require additional inpatient hospital stay due to EtOH withdrawal   Discharge Plan: Anticipate discharge in 48-72 hrs to discharge location to be determined pending rehab evaluations.    Code Status: Level 1 - Full Code    Subjective   Seen and examined at bedside, patient confused and unable to provide a reliable history.  Nursing is concerned for agitation which is not adequately controlled on phenobarbital, and patient reportedly became increasingly agitated after he was given benzodiazepines overnight    Objective :  Temp:  [98.2 °F (36.8 °C)-98.4 °F (36.9 °C)] 98.2 °F (36.8 °C)  HR:  [65-86] 79  BP: ()/(58-98) 167/77  Resp:  [18-33] 28  SpO2:  [92 %-97 %] 94 %  O2 Device: None (Room air)    Body mass index is 31.6 kg/m².     Input and Output Summary (last 24 hours):     Intake/Output Summary (Last 24 hours) at 10/31/2024 1742  Last data filed at 10/31/2024 1301  Gross per 24 hour   Intake 200 ml   Output 1140 ml   Net -940 ml       Physical Exam  Vitals and nursing note reviewed.   Constitutional:       General: He is not in acute distress.     Appearance: He is well-developed. He is diaphoretic.   HENT:      Head:  Normocephalic and atraumatic.      Mouth/Throat:      Mouth: Mucous membranes are moist.   Eyes:      General: No scleral icterus.     Extraocular Movements: Extraocular movements intact.      Conjunctiva/sclera: Conjunctivae normal.   Cardiovascular:      Rate and Rhythm: Normal rate and regular rhythm.      Pulses: Normal pulses.      Heart sounds: No murmur heard.     No friction rub. No gallop.   Pulmonary:      Effort: Pulmonary effort is normal. No respiratory distress.      Breath sounds: Rhonchi present. No wheezing.      Comments: Ronchi clear with cough  Abdominal:      General: Abdomen is flat. Bowel sounds are normal. There is no distension.      Palpations: Abdomen is soft.      Tenderness: There is no abdominal tenderness. There is no guarding or rebound.   Musculoskeletal:         General: No swelling.      Right lower leg: No edema.      Left lower leg: No edema.   Skin:     General: Skin is warm.      Capillary Refill: Capillary refill takes less than 2 seconds.      Coloration: Skin is not jaundiced.      Findings: No rash.   Neurological:      Mental Status: He is alert. He is disoriented.      Sensory: No sensory deficit.      Motor: No weakness.      Comments: Not following commands   Psychiatric:         Mood and Affect: Mood normal.           Lines/Drains:              Lab Results: I have reviewed the following results:   Results from last 7 days   Lab Units 10/31/24  0604   WBC Thousand/uL 5.20   HEMOGLOBIN g/dL 12.7   HEMATOCRIT % 37.7   PLATELETS Thousands/uL 175     Results from last 7 days   Lab Units 10/31/24  0604   SODIUM mmol/L 135   POTASSIUM mmol/L 4.0   CHLORIDE mmol/L 103   CO2 mmol/L 25   BUN mg/dL 14   CREATININE mg/dL 0.94   ANION GAP mmol/L 7   CALCIUM mg/dL 8.4   ALBUMIN g/dL 3.8   TOTAL BILIRUBIN mg/dL 1.05*   ALK PHOS U/L 50   ALT U/L 14   AST U/L 19   GLUCOSE RANDOM mg/dL 96     Results from last 7 days   Lab Units 10/30/24  0447   INR  1.15     Results from last 7 days    Lab Units 10/30/24  0726 10/30/24  0446 10/29/24  1432   POC GLUCOSE mg/dl 106 116 132     Results from last 7 days   Lab Units 10/30/24  0447   HEMOGLOBIN A1C % 5.5           Recent Cultures (last 7 days):         Imaging Results Review: I reviewed radiology reports from this admission including: CT head.      Last 24 Hours Medication List:     Current Facility-Administered Medications:     aspirin (ECOTRIN LOW STRENGTH) EC tablet 81 mg, Daily    atorvastatin (LIPITOR) tablet 80 mg, Daily With Dinner    carvedilol (COREG) tablet 12.5 mg, BID With Meals    chlorhexidine (PERIDEX) 0.12 % oral rinse 15 mL, Q12H SHEA    clopidogrel (PLAVIX) tablet 75 mg, Daily    dexmedeTOMIDine (Precedex) 400 mcg in sodium chloride 0.9% 100 mL, Titrated    heparin (porcine) subcutaneous injection 5,000 Units, Q8H SHEA    hydrALAZINE (APRESOLINE) injection 5 mg, Q6H PRN    labetalol (NORMODYNE) injection 10 mg, Q4H PRN    thiamine tablet 100 mg, Daily    Administrative Statements   Today, Patient Was Seen By: Oliver Sewell      **Please Note: This note may have been constructed using a voice recognition system.**

## 2024-11-01 ENCOUNTER — APPOINTMENT (INPATIENT)
Dept: RADIOLOGY | Facility: HOSPITAL | Age: 64
DRG: 064 | End: 2024-11-01
Payer: COMMERCIAL

## 2024-11-01 PROBLEM — N17.9 AKI (ACUTE KIDNEY INJURY) (HCC): Status: RESOLVED | Noted: 2024-10-31 | Resolved: 2024-11-01

## 2024-11-01 LAB
ANION GAP SERPL CALCULATED.3IONS-SCNC: 11 MMOL/L (ref 4–13)
BASOPHILS # BLD AUTO: 0.05 THOUSANDS/ΜL (ref 0–0.1)
BASOPHILS NFR BLD AUTO: 1 % (ref 0–1)
BUN SERPL-MCNC: 17 MG/DL (ref 5–25)
CALCIUM SERPL-MCNC: 8.5 MG/DL (ref 8.4–10.2)
CHLORIDE SERPL-SCNC: 106 MMOL/L (ref 96–108)
CK SERPL-CCNC: 648 U/L (ref 39–308)
CO2 SERPL-SCNC: 19 MMOL/L (ref 21–32)
CREAT SERPL-MCNC: 0.92 MG/DL (ref 0.6–1.3)
EOSINOPHIL # BLD AUTO: 0.02 THOUSAND/ΜL (ref 0–0.61)
EOSINOPHIL NFR BLD AUTO: 0 % (ref 0–6)
ERYTHROCYTE [DISTWIDTH] IN BLOOD BY AUTOMATED COUNT: 12.8 % (ref 11.6–15.1)
GFR SERPL CREATININE-BSD FRML MDRD: 88 ML/MIN/1.73SQ M
GLUCOSE SERPL-MCNC: 90 MG/DL (ref 65–140)
HCT VFR BLD AUTO: 39.9 % (ref 36.5–49.3)
HGB BLD-MCNC: 13.4 G/DL (ref 12–17)
IMM GRANULOCYTES # BLD AUTO: 0.02 THOUSAND/UL (ref 0–0.2)
IMM GRANULOCYTES NFR BLD AUTO: 0 % (ref 0–2)
LACTATE SERPL-SCNC: 0.6 MMOL/L (ref 0.5–2)
LYMPHOCYTES # BLD AUTO: 0.54 THOUSANDS/ΜL (ref 0.6–4.47)
LYMPHOCYTES NFR BLD AUTO: 9 % (ref 14–44)
MAGNESIUM SERPL-MCNC: 2.4 MG/DL (ref 1.9–2.7)
MCH RBC QN AUTO: 28.7 PG (ref 26.8–34.3)
MCHC RBC AUTO-ENTMCNC: 33.6 G/DL (ref 31.4–37.4)
MCV RBC AUTO: 85 FL (ref 82–98)
MONOCYTES # BLD AUTO: 0.53 THOUSAND/ΜL (ref 0.17–1.22)
MONOCYTES NFR BLD AUTO: 9 % (ref 4–12)
NEUTROPHILS # BLD AUTO: 4.8 THOUSANDS/ΜL (ref 1.85–7.62)
NEUTS SEG NFR BLD AUTO: 81 % (ref 43–75)
NRBC BLD AUTO-RTO: 0 /100 WBCS
PLATELET # BLD AUTO: 182 THOUSANDS/UL (ref 149–390)
PMV BLD AUTO: 9.6 FL (ref 8.9–12.7)
POTASSIUM SERPL-SCNC: 4.2 MMOL/L (ref 3.5–5.3)
RBC # BLD AUTO: 4.67 MILLION/UL (ref 3.88–5.62)
SODIUM SERPL-SCNC: 136 MMOL/L (ref 135–147)
WBC # BLD AUTO: 5.96 THOUSAND/UL (ref 4.31–10.16)

## 2024-11-01 PROCEDURE — 70551 MRI BRAIN STEM W/O DYE: CPT

## 2024-11-01 PROCEDURE — 82550 ASSAY OF CK (CPK): CPT | Performed by: NURSE PRACTITIONER

## 2024-11-01 PROCEDURE — 99233 SBSQ HOSP IP/OBS HIGH 50: CPT | Performed by: PHYSICIAN ASSISTANT

## 2024-11-01 PROCEDURE — NC001 PR NO CHARGE: Performed by: PHYSICIAN ASSISTANT

## 2024-11-01 PROCEDURE — 85025 COMPLETE CBC W/AUTO DIFF WBC: CPT | Performed by: NURSE PRACTITIONER

## 2024-11-01 PROCEDURE — 83735 ASSAY OF MAGNESIUM: CPT | Performed by: STUDENT IN AN ORGANIZED HEALTH CARE EDUCATION/TRAINING PROGRAM

## 2024-11-01 PROCEDURE — 80048 BASIC METABOLIC PNL TOTAL CA: CPT | Performed by: STUDENT IN AN ORGANIZED HEALTH CARE EDUCATION/TRAINING PROGRAM

## 2024-11-01 PROCEDURE — 83605 ASSAY OF LACTIC ACID: CPT | Performed by: NURSE PRACTITIONER

## 2024-11-01 RX ORDER — PHENOBARBITAL SODIUM 65 MG/ML
90 INJECTION, SOLUTION INTRAMUSCULAR; INTRAVENOUS 3 TIMES DAILY
Status: COMPLETED | OUTPATIENT
Start: 2024-11-01 | End: 2024-11-02

## 2024-11-01 RX ORDER — PHENOBARBITAL SODIUM 65 MG/ML
60 INJECTION, SOLUTION INTRAMUSCULAR; INTRAVENOUS 3 TIMES DAILY
Status: COMPLETED | OUTPATIENT
Start: 2024-11-03 | End: 2024-11-04

## 2024-11-01 RX ORDER — PHENOBARBITAL SODIUM 65 MG/ML
15 INJECTION, SOLUTION INTRAMUSCULAR; INTRAVENOUS 3 TIMES DAILY
Status: DISCONTINUED | OUTPATIENT
Start: 2024-11-07 | End: 2024-11-06 | Stop reason: HOSPADM

## 2024-11-01 RX ORDER — PHENOBARBITAL SODIUM 65 MG/ML
30 INJECTION, SOLUTION INTRAMUSCULAR; INTRAVENOUS 3 TIMES DAILY
Status: DISCONTINUED | OUTPATIENT
Start: 2024-11-05 | End: 2024-11-06 | Stop reason: HOSPADM

## 2024-11-01 RX ADMIN — HEPARIN SODIUM 5000 UNITS: 5000 INJECTION INTRAVENOUS; SUBCUTANEOUS at 05:35

## 2024-11-01 RX ADMIN — PHENOBARBITAL SODIUM 90 MG: 65 INJECTION INTRAMUSCULAR at 17:22

## 2024-11-01 RX ADMIN — PHENOBARBITAL SODIUM 90 MG: 65 INJECTION INTRAMUSCULAR at 10:51

## 2024-11-01 RX ADMIN — LABETALOL HYDROCHLORIDE 10 MG: 5 INJECTION, SOLUTION INTRAVENOUS at 02:47

## 2024-11-01 RX ADMIN — HEPARIN SODIUM 5000 UNITS: 5000 INJECTION INTRAVENOUS; SUBCUTANEOUS at 17:22

## 2024-11-01 RX ADMIN — NICARDIPINE HYDROCHLORIDE 2.5 MG/HR: 2.5 INJECTION, SOLUTION INTRAVENOUS at 03:33

## 2024-11-01 RX ADMIN — HYDRALAZINE HYDROCHLORIDE 5 MG: 20 INJECTION, SOLUTION INTRAMUSCULAR; INTRAVENOUS at 07:22

## 2024-11-01 RX ADMIN — ATORVASTATIN CALCIUM 80 MG: 80 TABLET, FILM COATED ORAL at 17:22

## 2024-11-01 RX ADMIN — PHENOBARBITAL SODIUM 90 MG: 65 INJECTION INTRAMUSCULAR at 21:17

## 2024-11-01 RX ADMIN — THIAMINE HYDROCHLORIDE: 100 INJECTION, SOLUTION INTRAMUSCULAR; INTRAVENOUS at 09:35

## 2024-11-01 RX ADMIN — CLOPIDOGREL BISULFATE 75 MG: 75 TABLET, FILM COATED ORAL at 10:46

## 2024-11-01 RX ADMIN — HYDRALAZINE HYDROCHLORIDE 5 MG: 20 INJECTION, SOLUTION INTRAMUSCULAR; INTRAVENOUS at 00:17

## 2024-11-01 RX ADMIN — HEPARIN SODIUM 5000 UNITS: 5000 INJECTION INTRAVENOUS; SUBCUTANEOUS at 21:16

## 2024-11-01 RX ADMIN — ASPIRIN 81 MG: 81 TABLET, COATED ORAL at 10:46

## 2024-11-01 RX ADMIN — CARVEDILOL 12.5 MG: 12.5 TABLET, FILM COATED ORAL at 10:46

## 2024-11-01 RX ADMIN — CHLORHEXIDINE GLUCONATE 0.12% ORAL RINSE 15 ML: 1.2 LIQUID ORAL at 21:16

## 2024-11-01 RX ADMIN — CARVEDILOL 12.5 MG: 12.5 TABLET, FILM COATED ORAL at 17:22

## 2024-11-01 RX ADMIN — DEXMEDETOMIDINE HYDROCHLORIDE 0.6 MCG/KG/HR: 400 INJECTION INTRAVENOUS at 06:31

## 2024-11-01 NOTE — ASSESSMENT & PLAN NOTE
Patient reported 1 case of beer per weekend. Unclear actual intake history   10/30/24 - 10/31/24: Began undergoing acute alcohol withdrawal symptoms. Given total of 1170mg phenobarbital and 4mg IV ativan with on going symptoms.      Plan:   Thiamine 100mg daily   Folate 1mg dailly   Precedex titrated to symotoms and behavior

## 2024-11-01 NOTE — PROGRESS NOTES
Progress Note - Critical Care/ICU   Name: Vlad Gregorio 64 y.o. male I MRN: [id number]  Unit/Bed#: [facility]-313-01 I Date of Admission: 10/29/2024   Date of Service: 11/1/2024 I Hospital Day: 3       Assessment & Plan  Penetrating atherosclerotic ulcer of aorta (HCC)  10/29/24: Presented to [facility] with lower extremity weakness   Imaging:  10/29/24 CTA head/neck: Irregularity/outpouching along the undersurface of the aortic arch, just distal to the origin of the left subclavian artery, with eccentric adjacent soft tissue density and stranding, extending anteriorly and medially, adjacent to the outpouching in the mediastinum, and eccentric soft tissue density extending along the proximal left subclavian artery, and additional stranding extending into the anterior mediastinum. No visible dissection flap is identified. Given the appearance, this is most concerning for a penetrating atherosclerotic ulcer, with associated rupture given the adjacent mediastinal soft tissue density/hematoma  10/29/24 CTA C/A/P:  Again noted along the undersurface of the posterior aortic arch is a contour abnormality in the wall as seen on images 605/93 and 606/103 with surrounding fluid most consistent with penetrating ulcer with focal contained rupture. No active extravasation on the current exam and no worsening since the study from 2 hours prior. No aneurysm or dissection    Plan:   SBP goal <120mmHg  Continuous infusions;   Cardene 5mg/hr   Scheduled medications:   Currently unable to obtain while NPO  Carvedilol 12.5mg BID  Continue frequent neurovascular checks  Cardiac surgery consulted, appreciate recommendations   Outpatient repeat CTA ordered   Right leg weakness  10/29/24: Initially presented with acute RLE weakness at [facility] and was a stroke alert   Unclear if symptoms are related to PAD and AZ versus acute on chronic CVA's   Neuroimaging:   10/29/24 CT head: New hypodense areas involving the bilateral basal ganglia, which may be  related to prior infarcts, or worsening chronic microangiopathic changes, but more recent infarcts in these regions cannot be excluded. Encephalomalacia involving the right occipital lobe related to prior infarct. Old thalamic lacunar infarct again demonstrated. Mild to moderate chronic microangiopathic white matter changes  10/29/24 CTA head/neck: No large vessel occlusion, high-grade stenosis, or intracranial aneurysm identified on CT angiogram of the head. Approximately 40 to 50% stenosis of the right cervical internal carotid artery due to atherosclerotic plaque at the right carotid bulb/bifurcation. No stenosis of the left cervical internal carotid artery. No stenosis of the bilateral cervical vertebral arteries    Plan:  Neurology consulted, appreciate recommendations   ASA 81mg daily   Plavix 75mg daily   Atorvatatin 80mg qHS   MRI brain pending   Tobacco abuse  25 pack year history     Plan:   Provide cessation education when able   Alcohol abuse  Patient reported 1 case of beer per weekend. Unclear actual intake history   10/30/24 - 10/31/24: Began undergoing acute alcohol withdrawal symptoms. Given total of 1170mg phenobarbital and 4mg IV ativan with on going symptoms.      Plan:   Thiamine 100mg daily   Folate 1mg dailly   Precedex titrated to symotoms and behavior   ELZA (acute kidney injury) (HCC) (Resolved: 11/1/2024)    Disposition: Stepdown Level 1    ICU Core Measures     A: Assess, Prevent, and Manage Pain Has pain been assessed? Yes  Need for changes to pain regimen? No   B: Both SAT/SAT  N/A   C: Choice of Sedation RASS Goal: 0 Alert and Calm  Need for changes to sedation or analgesia regimen? No   D: Delirium CAM-ICU: Positive   E: Early Mobility  Plan for early mobility? Yes   F: Family Engagement Plan for family engagement today? Yes         Prophylaxis:  VTE VTE covered by:  heparin (porcine), Subcutaneous, 5,000 Units at 10/31/24 2205       Stress Ulcer  not ordered         24 Hour Events :  Patient remained relatively calm on precedex overnight. Not alert enough to receive oral medications. Cardene infusion started overnight     Subjective   Review of Systems: Review of Systems   Cardiovascular:  Negative for chest pain.   Gastrointestinal:  Negative for nausea.   Musculoskeletal:  Negative for back pain.   Neurological:  Negative for headaches.       Objective :                   Vitals I/O      Most Recent Min/Max in 24hrs   Temp 97.7 °F (36.5 °C) Temp  Min: 97.7 °F (36.5 °C)  Max: 98.3 °F (36.8 °C)   Pulse 72 Pulse  Min: 67  Max: 86   Resp 20 Resp  Min: 19  Max: 45   /75 BP  Min: 113/68  Max: 197/79   O2 Sat 94 % SpO2  Min: 92 %  Max: 98 %      Intake/Output Summary (Last 24 hours) at 11/1/2024 0444  Last data filed at 11/1/2024 0201  Gross per 24 hour   Intake 749.89 ml   Output 665 ml   Net 84.89 ml       Diet Regular; Regular House    Invasive Monitoring           Physical Exam   Physical Exam  Vitals and nursing note reviewed.   Eyes:      General: No scleral icterus.  Skin:     General: Skin is warm and moist.      Capillary Refill: Capillary refill takes less than 2 seconds.   HENT:      Head: Normocephalic and atraumatic.      Mouth/Throat:      Lips: Pink.      Mouth: Mucous membranes are dry.   Cardiovascular:      Rate and Rhythm: Normal rate and regular rhythm.      Pulses:           Radial pulses are 2+ on the right side and 2+ on the left side.        Dorsalis pedis pulses are 2+ on the right side and 2+ on the left side.      Heart sounds: Normal heart sounds.   Musculoskeletal:      Right lower leg: No edema.      Left lower leg: No edema.   Abdominal: General: Abdomen is protuberant. Bowel sounds are decreased. There is no distension.      Palpations: Abdomen is soft.      Tenderness: There is no abdominal tenderness.   Constitutional:       General: He is in acute distress.      Appearance: He is well-developed and well-nourished.   Pulmonary:      Effort: Pulmonary effort is  normal.      Breath sounds: Normal breath sounds.   Neurological:      General: No focal deficit present.      Mental Status: He is lethargic.      GCS: GCS eye subscore is 3. GCS verbal subscore is 4. GCS motor subscore is 6.      Motor: No tremor.      Comments: BLE 4/5           Diagnostic Studies        Lab Results: I have reviewed the following results:     Medications:  Scheduled PRN   aspirin, 81 mg, Daily  atorvastatin, 80 mg, Daily With Dinner  carvedilol, 12.5 mg, BID With Meals  chlorhexidine, 15 mL, Q12H SHEA  clopidogrel, 75 mg, Daily  heparin (porcine), 5,000 Units, Q8H SHEA  thiamine, 100 mg, Daily      hydrALAZINE, 5 mg, Q6H PRN  labetalol, 10 mg, Q4H PRN       Continuous    dexmedetomidine, 0.1-0.7 mcg/kg/hr, Last Rate: 0.7 mcg/kg/hr (11/01/24 0150)  niCARdipine, 1-15 mg/hr, Last Rate: 5 mg/hr (11/01/24 0359)         Labs:   CBC    Recent Labs     10/31/24  0604 11/01/24  0444   WBC 5.20 5.96   HGB 12.7 13.4   HCT 37.7 39.9    182     BMP    Recent Labs     10/30/24  0447 10/31/24  0604   SODIUM 135 135   K 4.0 4.0    103   CO2 21 25   AGAP 10 7   BUN 13 14   CREATININE 1.01 0.94   CALCIUM 8.4 8.4       Coags    Recent Labs     10/30/24  0447   INR 1.15        Additional Electrolytes  Recent Labs     10/30/24  0447 10/31/24  0604   MG 2.1 2.1   PHOS 3.5 3.0   CAIONIZED 1.10* 1.16          Blood Gas    No recent results  No recent results LFTs  Recent Labs     10/30/24  0447 10/31/24  0604   ALT 16 14   AST 22 19   ALKPHOS 52 50   ALB 3.9 3.8   TBILI 1.14* 1.05*       Infectious  No recent results  Glucose  Recent Labs     10/30/24  0447 10/31/24  0604   GLUC 110 96

## 2024-11-01 NOTE — ASSESSMENT & PLAN NOTE
10/29/24: Presented to [facility] with lower extremity weakness   Imaging:  10/29/24 CTA head/neck: Irregularity/outpouching along the undersurface of the aortic arch, just distal to the origin of the left subclavian artery, with eccentric adjacent soft tissue density and stranding, extending anteriorly and medially, adjacent to the outpouching in the mediastinum, and eccentric soft tissue density extending along the proximal left subclavian artery, and additional stranding extending into the anterior mediastinum. No visible dissection flap is identified. Given the appearance, this is most concerning for a penetrating atherosclerotic ulcer, with associated rupture given the adjacent mediastinal soft tissue density/hematoma  10/29/24 CTA C/A/P:  Again noted along the undersurface of the posterior aortic arch is a contour abnormality in the wall as seen on images 605/93 and 606/103 with surrounding fluid most consistent with penetrating ulcer with focal contained rupture. No active extravasation on the current exam and no worsening since the study from 2 hours prior. No aneurysm or dissection    Plan:   SBP goal <120mmHg  Continuous infusions;   Cardene 5mg/hr   Scheduled medications:   Currently unable to obtain while NPO  Carvedilol 12.5mg BID  Continue frequent neurovascular checks  Cardiac surgery consulted, appreciate recommendations   Outpatient repeat CTA ordered

## 2024-11-01 NOTE — ASSESSMENT & PLAN NOTE
10/29/24: Initially presented with acute RLE weakness at [facility] and was a stroke alert   Unclear if symptoms are related to PAD and AZ versus acute on chronic CVA's   Neuroimaging:   10/29/24 CT head: New hypodense areas involving the bilateral basal ganglia, which may be related to prior infarcts, or worsening chronic microangiopathic changes, but more recent infarcts in these regions cannot be excluded. Encephalomalacia involving the right occipital lobe related to prior infarct. Old thalamic lacunar infarct again demonstrated. Mild to moderate chronic microangiopathic white matter changes  10/29/24 CTA head/neck: No large vessel occlusion, high-grade stenosis, or intracranial aneurysm identified on CT angiogram of the head. Approximately 40 to 50% stenosis of the right cervical internal carotid artery due to atherosclerotic plaque at the right carotid bulb/bifurcation. No stenosis of the left cervical internal carotid artery. No stenosis of the bilateral cervical vertebral arteries    Plan:  Neurology consulted, appreciate recommendations   ASA 81mg daily   Plavix 75mg daily   Atorvatatin 80mg qHS   MRI brain pending

## 2024-11-01 NOTE — PROGRESS NOTES
Progress Note - Critical Care/ICU   Name: Vlad Gregorio 64 y.o. male I MRN: [id number]  Unit/Bed#: PPHP-313-01 I Date of Admission: 10/29/2024   Date of Service: 11/1/2024 I Hospital Day: 3      Critical Care Interval Transfer Note:    Brief Hospital Summary: Hospital Course: Vlad Gregorio is a 64 y.o. with a PMHx of rectal cancer s/p LAR in 2016 who presents to OSH for new onset RLE weakness. Patient presented to ED as a stroke alert. CT/CTA was negative for acute LVO but possible subacute/chronic infarcts were seen on CTH. CTA head/neck did identify abnormality of the aortic arch and a dedicated CTA C/A/P showed a possible AZ with a focal contained rupture. The patient's initial SBP was in the 180's, he was started on a nicardipine gtt and transferred to [facility] for CTS consultation and higher level of care. Patient arrived and was placed on Cardene and coreg. Cardene was successfully weaned off with coreg with BP < 120. Patient was seen by CTSx and deemed to not have an acute aortic process.  Patient was seen by Neurology and placed on stroke pathway.    While on the floor, he developed DTs and was given 1,170 mg of phenobarb but still needed precedex infusion so was transferred back to ICU. He was started on a phenbarb taper and precedex was discontinued. He is now stable for transfer back to [facility].     Barriers to discharge:   Ongoing stroke work up   Ongoing treatment of ETOH withdrawl     Consults: IP CONSULT TO CASE MANAGEMENT  IP CONSULT TO CARDIOTHORACIC SURGERY  CONSULT TO CERTIFIED      Patient seen and evaluated by Critical Care today and deemed to be appropriate for transfer to Med Surg. Spoke to Dr. Bowman from [facility] to accept transfer. Critical care can be contacted via SecureChat with any questions or concerns.

## 2024-11-01 NOTE — PLAN OF CARE
Problem: Prexisting or High Potential for Compromised Skin Integrity  Goal: Skin integrity is maintained or improved  Description: INTERVENTIONS:  - Identify patients at risk for skin breakdown  - Assess and monitor skin integrity  - Assess and monitor nutrition and hydration status  - Monitor labs   - Assess for incontinence   - Turn and reposition patient  - Assist with mobility/ambulation  - Relieve pressure over bony prominences  - Avoid friction and shearing  - Provide appropriate hygiene as needed including keeping skin clean and dry  - Evaluate need for skin moisturizer/barrier cream  - Collaborate with interdisciplinary team   - Patient/family teaching  - Consider wound care consult   Outcome: Progressing     Problem: PAIN - ADULT  Goal: Verbalizes/displays adequate comfort level or baseline comfort level  Description: Interventions:  - Encourage patient to monitor pain and request assistance  - Assess pain using appropriate pain scale  - Administer analgesics based on type and severity of pain and evaluate response  - Implement non-pharmacological measures as appropriate and evaluate response  - Consider cultural and social influences on pain and pain management  - Notify physician/advanced practitioner if interventions unsuccessful or patient reports new pain  Outcome: Progressing     Problem: INFECTION - ADULT  Goal: Absence or prevention of progression during hospitalization  Description: INTERVENTIONS:  - Assess and monitor for signs and symptoms of infection  - Monitor lab/diagnostic results  - Monitor all insertion sites, i.e. indwelling lines, tubes, and drains  - Monitor endotracheal if appropriate and nasal secretions for changes in amount and color  - [place] appropriate cooling/warming therapies per order  - Administer medications as ordered  - Instruct and encourage patient and family to use good hand hygiene technique  - Identify and instruct in appropriate isolation precautions for  identified infection/condition  Outcome: Progressing  Goal: Absence of fever/infection during neutropenic period  Description: INTERVENTIONS:  - Monitor WBC    Outcome: Progressing     Problem: SAFETY ADULT  Goal: Patient will remain free of falls  Description: INTERVENTIONS:  - Educate patient/family on patient safety including physical limitations  - Instruct patient to call for assistance with activity   - Consult OT/PT to assist with strengthening/mobility   - Keep Call bell within reach  - Keep bed low and locked with side rails adjusted as appropriate  - Keep care items and personal belongings within reach  - Initiate and maintain comfort rounds  - Make Fall Risk Sign visible to staff  - Offer Toileting every 2 Hours, in advance of need  - Initiate/Maintain bed alarm  - Obtain necessary fall risk management equipment  - Apply yellow socks and bracelet for high fall risk patients  - Consider moving patient to room near nurses station  Outcome: Progressing  Goal: Maintain or return to baseline ADL function  Description: INTERVENTIONS:  -  Assess patient's ability to carry out ADLs; assess patient's baseline for ADL function and identify physical deficits which impact ability to perform ADLs (bathing, care of mouth/teeth, toileting, grooming, dressing, etc.)  - Assess/evaluate cause of self-care deficits   - Assess range of motion  - Assess patient's mobility; develop plan if impaired  - Assess patient's need for assistive devices and provide as appropriate  - Encourage maximum independence but intervene and supervise when necessary  - Involve family in performance of ADLs  - Assess for home care needs following discharge   - Consider OT consult to assist with ADL evaluation and planning for discharge  - Provide patient education as appropriate  Outcome: Progressing  Goal: Maintains/Returns to pre admission functional level  Description: INTERVENTIONS:  - Perform AM-PAC 6 Click Basic Mobility/ Daily Activity  assessment daily.  - Set and communicate daily mobility goal to care team and patient/family/caregiver.   - Collaborate with rehabilitation services on mobility goals if consulted  - Perform Range of Motion 3 times a day.  - Reposition patient every 2 hours.  - Dangle patient 3 times a day  - Stand patient 3 times a day  - Ambulate patient 3 times a day  - Out of bed to chair 3 times a day   - Out of bed for meals 3 times a day  - Out of bed for toileting  - Record patient progress and toleration of activity level   Outcome: Progressing     Problem: DISCHARGE PLANNING  Goal: Discharge to home or other facility with appropriate resources  Description: INTERVENTIONS:  - Identify barriers to discharge w/patient and caregiver  - Arrange for needed discharge resources and transportation as appropriate  - Identify discharge learning needs (meds, wound care, etc.)  - Arrange for interpretive services to assist at discharge as needed  - Refer to Case Management Department for coordinating discharge planning if the patient needs post-hospital services based on physician/advanced practitioner order or complex needs related to functional status, cognitive ability, or social support system  Outcome: Progressing     Problem: Knowledge Deficit  Goal: Patient/family/caregiver demonstrates understanding of disease process, treatment plan, medications, and discharge instructions  Description: Complete learning assessment and assess knowledge base.  Interventions:  - Provide teaching at level of understanding  - Provide teaching via preferred learning methods  Outcome: Progressing     Problem: SAFETY,RESTRAINT: NV/NON-SELF DESTRUCTIVE BEHAVIOR  Goal: Remains free of harm/injury (restraint for non violent/non self-detsructive behavior)  Description: INTERVENTIONS:  - Instruct patient/family regarding restraint use   - Assess and monitor physiologic and psychological status   - Provide interventions and comfort measures to meet  assessed patient needs   - Identify and implement measures to help patient regain control  - Assess readiness for release of restraint   Outcome: Progressing  Goal: Returns to optimal restraint-free functioning  Description: INTERVENTIONS:  - Assess the patient's behavior and symptoms that indicate continued need for restraint  - Identify and implement measures to help patient regain control  - Assess readiness for release of restraint   Outcome: Progressing

## 2024-11-01 NOTE — PLAN OF CARE
Problem: Prexisting or High Potential for Compromised Skin Integrity  Goal: Skin integrity is maintained or improved  Description: INTERVENTIONS:  - Identify patients at risk for skin breakdown  - Assess and monitor skin integrity  - Assess and monitor nutrition and hydration status  - Monitor labs   - Assess for incontinence   - Turn and reposition patient  - Assist with mobility/ambulation  - Relieve pressure over bony prominences  - Avoid friction and shearing  - Provide appropriate hygiene as needed including keeping skin clean and dry  - Evaluate need for skin moisturizer/barrier cream  - Collaborate with interdisciplinary team   - Patient/family teaching  - Consider wound care consult   Outcome: Progressing     Problem: PAIN - ADULT  Goal: Verbalizes/displays adequate comfort level or baseline comfort level  Description: Interventions:  - Encourage patient to monitor pain and request assistance  - Assess pain using appropriate pain scale  - Administer analgesics based on type and severity of pain and evaluate response  - Implement non-pharmacological measures as appropriate and evaluate response  - Consider cultural and social influences on pain and pain management  - Notify physician/advanced practitioner if interventions unsuccessful or patient reports new pain  Outcome: Progressing     Problem: INFECTION - ADULT  Goal: Absence or prevention of progression during hospitalization  Description: INTERVENTIONS:  - Assess and monitor for signs and symptoms of infection  - Monitor lab/diagnostic results  - Monitor all insertion sites, i.e. indwelling lines, tubes, and drains  - Monitor endotracheal if appropriate and nasal secretions for changes in amount and color  - [place] appropriate cooling/warming therapies per order  - Administer medications as ordered  - Instruct and encourage patient and family to use good hand hygiene technique  - Identify and instruct in appropriate isolation precautions for  identified infection/condition  Outcome: Progressing  Goal: Absence of fever/infection during neutropenic period  Description: INTERVENTIONS:  - Monitor WBC    Outcome: Progressing     Problem: SAFETY ADULT  Goal: Patient will remain free of falls  Description: INTERVENTIONS:  - Educate patient/family on patient safety including physical limitations  - Instruct patient to call for assistance with activity   - Consult OT/PT to assist with strengthening/mobility   - Keep Call bell within reach  - Keep bed low and locked with side rails adjusted as appropriate  - Keep care items and personal belongings within reach  - Initiate and maintain comfort rounds  - Make Fall Risk Sign visible to staff  - Offer Toileting every 2 Hours, in advance of need  - Initiate/Maintain bed alarm  - Obtain necessary fall risk management equipment: bed alarm, yellow socks  - Apply yellow socks and bracelet for high fall risk patients  - Consider moving patient to room near nurses station  Outcome: Progressing  Goal: Maintain or return to baseline ADL function  Description: INTERVENTIONS:  -  Assess patient's ability to carry out ADLs; assess patient's baseline for ADL function and identify physical deficits which impact ability to perform ADLs (bathing, care of mouth/teeth, toileting, grooming, dressing, etc.)  - Assess/evaluate cause of self-care deficits   - Assess range of motion  - Assess patient's mobility; develop plan if impaired  - Assess patient's need for assistive devices and provide as appropriate  - Encourage maximum independence but intervene and supervise when necessary  - Involve family in performance of ADLs  - Assess for home care needs following discharge   - Consider OT consult to assist with ADL evaluation and planning for discharge  - Provide patient education as appropriate  Outcome: Progressing  Goal: Maintains/Returns to pre admission functional level  Description: INTERVENTIONS:  - Perform AM-PAC 6 Click Basic  Mobility/ Daily Activity assessment daily.  - Set and communicate daily mobility goal to care team and patient/family/caregiver.   - Collaborate with rehabilitation services on mobility goals if consulted  - Out of bed for toileting  - Record patient progress and toleration of activity level   Outcome: Progressing     Problem: DISCHARGE PLANNING  Goal: Discharge to home or other facility with appropriate resources  Description: INTERVENTIONS:  - Identify barriers to discharge w/patient and caregiver  - Arrange for needed discharge resources and transportation as appropriate  - Identify discharge learning needs (meds, wound care, etc.)  - Arrange for interpretive services to assist at discharge as needed  - Refer to Case Management Department for coordinating discharge planning if the patient needs post-hospital services based on physician/advanced practitioner order or complex needs related to functional status, cognitive ability, or social support system  Outcome: Progressing     Problem: Knowledge Deficit  Goal: Patient/family/caregiver demonstrates understanding of disease process, treatment plan, medications, and discharge instructions  Description: Complete learning assessment and assess knowledge base.  Interventions:  - Provide teaching at level of understanding  - Provide teaching via preferred learning methods  Outcome: Progressing     Problem: SAFETY,RESTRAINT: NV/NON-SELF DESTRUCTIVE BEHAVIOR  Goal: Remains free of harm/injury (restraint for non violent/non self-detsructive behavior)  Description: INTERVENTIONS:  - Instruct patient/family regarding restraint use   - Assess and monitor physiologic and psychological status   - Provide interventions and comfort measures to meet assessed patient needs   - Identify and implement measures to help patient regain control  - Assess readiness for release of restraint   Outcome: Progressing  Goal: Returns to optimal restraint-free functioning  Description:  INTERVENTIONS:  - Assess the patient's behavior and symptoms that indicate continued need for restraint  - Identify and implement measures to help patient regain control  - Assess readiness for release of restraint   Outcome: Progressing

## 2024-11-02 PROBLEM — I63.9 CVA (CEREBRAL VASCULAR ACCIDENT) (HCC): Status: ACTIVE | Noted: 2024-11-02

## 2024-11-02 LAB
ANION GAP SERPL CALCULATED.3IONS-SCNC: 6 MMOL/L (ref 4–13)
BUN SERPL-MCNC: 20 MG/DL (ref 5–25)
CALCIUM SERPL-MCNC: 8.4 MG/DL (ref 8.4–10.2)
CHLORIDE SERPL-SCNC: 107 MMOL/L (ref 96–108)
CO2 SERPL-SCNC: 23 MMOL/L (ref 21–32)
CREAT SERPL-MCNC: 0.91 MG/DL (ref 0.6–1.3)
GFR SERPL CREATININE-BSD FRML MDRD: 89 ML/MIN/1.73SQ M
GLUCOSE SERPL-MCNC: 88 MG/DL (ref 65–140)
MAGNESIUM SERPL-MCNC: 2.1 MG/DL (ref 1.9–2.7)
POTASSIUM SERPL-SCNC: 3.7 MMOL/L (ref 3.5–5.3)
SODIUM SERPL-SCNC: 136 MMOL/L (ref 135–147)

## 2024-11-02 PROCEDURE — 83735 ASSAY OF MAGNESIUM: CPT | Performed by: PHYSICIAN ASSISTANT

## 2024-11-02 PROCEDURE — 99233 SBSQ HOSP IP/OBS HIGH 50: CPT | Performed by: STUDENT IN AN ORGANIZED HEALTH CARE EDUCATION/TRAINING PROGRAM

## 2024-11-02 PROCEDURE — 99232 SBSQ HOSP IP/OBS MODERATE 35: CPT | Performed by: STUDENT IN AN ORGANIZED HEALTH CARE EDUCATION/TRAINING PROGRAM

## 2024-11-02 PROCEDURE — 80048 BASIC METABOLIC PNL TOTAL CA: CPT | Performed by: PHYSICIAN ASSISTANT

## 2024-11-02 RX ORDER — HYDROCHLOROTHIAZIDE 12.5 MG/1
12.5 TABLET ORAL DAILY
Status: DISCONTINUED | OUTPATIENT
Start: 2024-11-02 | End: 2024-11-06 | Stop reason: HOSPADM

## 2024-11-02 RX ORDER — AMLODIPINE BESYLATE 5 MG/1
5 TABLET ORAL DAILY
Status: DISCONTINUED | OUTPATIENT
Start: 2024-11-02 | End: 2024-11-02

## 2024-11-02 RX ADMIN — ASPIRIN 81 MG: 81 TABLET, COATED ORAL at 08:44

## 2024-11-02 RX ADMIN — PHENOBARBITAL SODIUM 90 MG: 65 INJECTION INTRAMUSCULAR at 15:20

## 2024-11-02 RX ADMIN — HYDROCHLOROTHIAZIDE 12.5 MG: 12.5 TABLET ORAL at 16:24

## 2024-11-02 RX ADMIN — LABETALOL HYDROCHLORIDE 10 MG: 5 INJECTION, SOLUTION INTRAVENOUS at 15:20

## 2024-11-02 RX ADMIN — CARVEDILOL 12.5 MG: 12.5 TABLET, FILM COATED ORAL at 15:30

## 2024-11-02 RX ADMIN — ATORVASTATIN CALCIUM 80 MG: 80 TABLET, FILM COATED ORAL at 15:30

## 2024-11-02 RX ADMIN — CHLORHEXIDINE GLUCONATE 0.12% ORAL RINSE 15 ML: 1.2 LIQUID ORAL at 08:44

## 2024-11-02 RX ADMIN — LABETALOL HYDROCHLORIDE 10 MG: 5 INJECTION, SOLUTION INTRAVENOUS at 23:09

## 2024-11-02 RX ADMIN — HEPARIN SODIUM 5000 UNITS: 5000 INJECTION INTRAVENOUS; SUBCUTANEOUS at 21:14

## 2024-11-02 RX ADMIN — THIAMINE HYDROCHLORIDE: 100 INJECTION, SOLUTION INTRAMUSCULAR; INTRAVENOUS at 08:45

## 2024-11-02 RX ADMIN — PHENOBARBITAL SODIUM 90 MG: 65 INJECTION INTRAMUSCULAR at 21:14

## 2024-11-02 RX ADMIN — HEPARIN SODIUM 5000 UNITS: 5000 INJECTION INTRAVENOUS; SUBCUTANEOUS at 06:00

## 2024-11-02 RX ADMIN — HYDRALAZINE HYDROCHLORIDE 5 MG: 20 INJECTION, SOLUTION INTRAMUSCULAR; INTRAVENOUS at 02:07

## 2024-11-02 RX ADMIN — CHLORHEXIDINE GLUCONATE 0.12% ORAL RINSE 15 ML: 1.2 LIQUID ORAL at 21:14

## 2024-11-02 RX ADMIN — CLOPIDOGREL BISULFATE 75 MG: 75 TABLET, FILM COATED ORAL at 08:44

## 2024-11-02 RX ADMIN — CARVEDILOL 12.5 MG: 12.5 TABLET, FILM COATED ORAL at 07:09

## 2024-11-02 RX ADMIN — PHENOBARBITAL SODIUM 90 MG: 65 INJECTION INTRAMUSCULAR at 08:45

## 2024-11-02 RX ADMIN — HYDRALAZINE HYDROCHLORIDE 5 MG: 20 INJECTION, SOLUTION INTRAMUSCULAR; INTRAVENOUS at 16:27

## 2024-11-02 RX ADMIN — HEPARIN SODIUM 5000 UNITS: 5000 INJECTION INTRAVENOUS; SUBCUTANEOUS at 15:20

## 2024-11-02 NOTE — ASSESSMENT & PLAN NOTE
- Patient states he drinks a case of beers every weekend (roughly 30 beers per case)  - [facility] protocol  - Thiamine supplementation  - DT management per primary team

## 2024-11-02 NOTE — ASSESSMENT & PLAN NOTE
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  No plans for surgical management at this time  Blood pressure appears  generally better controlled now that patient is nearing the end of his withdrawals and can take oral medications.  Will add hydrochlorothiazide and amlodipine PO

## 2024-11-02 NOTE — PROGRESS NOTES
Progress Note - Neurology   Name: Vlad Gregorio 64 y.o. male I MRN: [id number]  Unit/Bed#: [facility]PHP-317-01 I Date of Admission: 10/29/2024   Date of Service: 11/2/2024 I Hospital Day: 4     Assessment & Plan  Right leg weakness  64 y.o. male with prior strokes/TIAs, history of colon cancer, tobacco use, and alcohol abuse who presented to [facility] on 10/29/2024 as a stroke alert for RLE weakness.  Patient now reports that it was R foot tingling.    Upon arrival to the ED, /83.    NIHSS 1 (orientation questions).  CT head:   New hypodense areas involving the bilateral basal ganglia, which may represent prior infarcts or worsening chronic microangiopathic changes, but cannot fully rule out recent infarcts.  Encephalomalacia involving the right occipital lobe related to prior infarct.  Old thalamic lacunar infarct.  CTA head/neck:  No LVO or significant stenosis  Irregularity/outpouching along the undersurface of the aortic arch concerning for penetrating atherosclerotic ulcer with associated rupture given adjacent mediastinal soft tissue density/hematoma  40 to 50% stenosis of the right cervical ICA due to atherosclerotic plaque at the right carotid bulb/bifurcation  Multiple pulmonary nodules involving the visualized right lung  CTA dissection protocol:  Again noted possible penetrating ulcer with focal contained rupture, but no active extravasation.  No aneurysm or dissection.  Patient was not a TNK candidate due to symptoms resolving.  Patient was loaded with aspirin 325 mg and Plavix 300 mg.  [facility] surgery evaluation.  The patient was seen by cardiac ICU team, the patient was placed on Cardene and weaned.  The patient was seen by [facility] and deemed not to have an acute aortic process, the patient also developed DTs and given phenobarb with taper   Echo ef is 65%, left atrium is mildly dilated  MRI of brain - Acute infarcts involving the right corona  radiata/gangliocapsular region, left gangliocapsular region and right parietal lobe. Mild mass effect without midline shift.  Moderate-marked chronic microangiopathy.      Plan:  - Stroke pathway  Lipid Panel: LDL 61, Cholesterol 123  Hemoglobin A1c 5.5  S/p aspirin 325 mg and Plavix 300 mg once  aspirin/Plavix   Patient states he was taking aspirin 81 mg only Monday-Friday, but not on the weekends  Start Atorvastatin 80 mg daily (statin naive PTA)  Avoid hypotension  Euglycemic, normothermic goal  Continue telemetry  PT/OT/ST  Stroke education  Frequent neuro checks. Continue to monitor and notify neurology with any changes.  STAT CT head for any acute change in neuro exam  - Medical management and supportive care per primary team. Correction of any metabolic or infectious disturbances.   - Reviewed with attending, plan of care per attending physician, please see attestation for details.   Tobacco abuse  - Counseled on smoking cessation    Alcohol abuse  - Patient states he drinks a case of beers every weekend (roughly 30 beers per case)  - [facility] protocol  - Thiamine supplementation  - DT management per primary team    Vlad Gregorio will need follow-up in in 6 weeks with neurovascular team for Other in 60 minute appointment. They will not require outpatient neurological testing.     Subjective   Neurology will follow up.  Per nursing no events overnight, the patient has been cooperative and eating with a nonfocal examination no new neurological event.  Patient continues to be slightly delirious, but his encephalopathy is improving, he is undergoing DT management.  Reviewed MRI findings with the patient.  The patient denies any complaints in particular headache, problems with speech, problems with vision, confusion, one-sided weakness, one-sided numbness, or ataxia    Review of Systems  12 point review of symptoms as per HPI otherwise negative    Objective :  Temp:  [97.9 °F (36.6 °C)-98.9 °F (37.2 °C)] 97.9 °F (36.6  °C)  HR:  [67-84] 73  BP: (126-159)/(62-82) 126/67  Resp:  [19-22] 20  SpO2:  [95 %-99 %] 96 %  O2 Device: None (Room air)    Current Facility-Administered Medications   Medication Dose Route Frequency Provider Last Rate    aspirin  81 mg Oral Daily Wenceslao Noel PA-C      atorvastatin  80 mg Oral Daily With Dinner Wenceslao Noel PA-C      carvedilol  12.5 mg Oral BID With Meals Wenceslao Noel PA-C      chlorhexidine  15 mL Mouth/Throat Q12H [facility] Wenceslao Noel PA-C      clopidogrel  75 mg Oral Daily Wenceslao Noel PA-C      folic acid 1 mg, thiamine (VITAMIN B1) 100 mg in sodium chloride 0.9 % 100 mL IV piggyback   Intravenous Daily Gely Price PA-C 0 mL/hr at 11/01/24 1005    heparin (porcine)  5,000 Units Subcutaneous Q8H [facility] Wenceslao Noel PA-C      hydrALAZINE  5 mg Intravenous Q6H PRN Gely Price PA-C      labetalol  10 mg Intravenous Q4H PRN Gely Price PA-C      PHENobarbital  90 mg Intravenous TID Gely Price PA-C      Followed by    [START ON 11/3/2024] PHENobarbital  60 mg Intravenous TID Gely Price PA-C      Followed by    [START ON 11/5/2024] PHENobarbital  30 mg Intravenous TID Gely Price PA-C      Followed by    [START ON 11/7/2024] PHENobarbital  15 mg Intravenous TID Gely Price PA-C        Neurological  Mental status -the patient is awake and alert he is oriented to person not to place or time, he believes he is in [facility], he reported it was 1994, he could not tell me the season, he could not tell me why he was in the hospital, he had poor insight and poor historian, he had decreased attention and concentration.  He had no aphasia or dysarthria, he was able to read name tag, menu, recognize objects, do simple calculations, he was able to follow one-step commands  Cranial nerves 2 through 12 -pupils are equal and reactive, extra movements are intact without any gaze preference or palsy, there was no obvious facial droop, visual fields were intact, tongue was  midline  Motor -  No drift in the upper or lowers, non focal motor examination.  He was able to lift his upper extremities and lower extremities off the bed equally without any evidence of drift or focality.  No tremor or fixed deficit noted  Rapid movements are equal bilaterally  Sensation - non-focal to touch, no neglect  Coordination -  no ataxia noted on finger-to-nose  No evidence of myoclonus or tremor.  No evidence of seizure activity, observed.        Lab Results: I have reviewed the following results:    Procedure: MRI brain wo contrast    Result Date: 11/1/2024  Narrative: MRI BRAIN WITHOUT CONTRAST INDICATION: stroke. COMPARISON:   CT head and CTA head and neck 10/29/2024. TECHNIQUE:  Multiplanar, multisequence imaging of the brain was performed. IMAGE QUALITY: Motion-degraded, which reduces diagnostic sensitivity. FINDINGS: BRAIN PARENCHYMA: Acute infarcts involving the right corona radiata/gangliocapsular region and to a lesser degree the left gangliocapsular region. Additional punctate infarct in the right parietal lobe. Mild mass effect. No midline shift. Moderate-marked chronic ischemic changes of the white matter. Chronic infarcts involving the gangliocapsular regions and right thalamus. VENTRICLES:  Normal for the patient's age. SELLA AND PITUITARY GLAND:  Normal. ORBITS: No acute abnormality. PARANASAL SINUSES: Trace mucosal thickening. VASCULATURE: Please refer to recent CTA. CALVARIUM AND SKULL BASE: No acute abnormality. EXTRACRANIAL SOFT TISSUES: No acute abnormality.     Impression: Acute infarcts involving the right corona radiata/gangliocapsular region, left gangliocapsular region and right parietal lobe. Mild mass effect without midline shift. Moderate-marked chronic microangiopathy. I personally discussed this study with BRITTANIE CLEMONS on 11/1/2024 3:20 PM. Workstation performed: [id number]       Reviewed case with neurology attending, history and physical examination, labs and imaging  completed, plan of care as per attending physician.    Please see attestation for further details.    .

## 2024-11-02 NOTE — ASSESSMENT & PLAN NOTE
On admission patient reported drinking a case of beer over several days with a course of a week.  He appears to be going through withdrawals and has since admitted that he drinks about half a case of beer a day  Patient has been given benzodiazepine medications for his withdrawal symptoms with apparent paradoxical agitation  Patient was placed on ICU level of care on Precedex infusion and has since been weaned.  He is resting comfortably in bed and has no new complaints  Continue thiamine, folate.  Certified  consult placed

## 2024-11-02 NOTE — ASSESSMENT & PLAN NOTE
64 y.o. male with prior strokes/TIAs, history of colon cancer, tobacco use, and alcohol abuse who presented to [facility] on 10/29/2024 as a stroke alert for RLE weakness.  Patient now reports that it was R foot tingling.    Upon arrival to the ED, /83.    NIHSS 1 (orientation questions).  CT head:   New hypodense areas involving the bilateral basal ganglia, which may represent prior infarcts or worsening chronic microangiopathic changes, but cannot fully rule out recent infarcts.  Encephalomalacia involving the right occipital lobe related to prior infarct.  Old thalamic lacunar infarct.  CTA head/neck:  No LVO or significant stenosis  Irregularity/outpouching along the undersurface of the aortic arch concerning for penetrating atherosclerotic ulcer with associated rupture given adjacent mediastinal soft tissue density/hematoma  40 to 50% stenosis of the right cervical ICA due to atherosclerotic plaque at the right carotid bulb/bifurcation  Multiple pulmonary nodules involving the visualized right lung  CTA dissection protocol:  Again noted possible penetrating ulcer with focal contained rupture, but no active extravasation.  No aneurysm or dissection.  Patient was not a TNK candidate due to symptoms resolving.  Patient was loaded with aspirin 325 mg and Plavix 300 mg.  Patient was transferred to [facility] for cardiothoracic surgery evaluation.  The patient was seen by cardiac ICU team, the patient was placed on Cardene and weaned.  The patient was seen by CTSx and deemed not to have an acute aortic process, the patient also developed DTs and given phenobarb with taper   Echo ef is 65%, left atrium is mildly dilated  MRI of brain - Acute infarcts involving the right corona radiata/gangliocapsular region, left gangliocapsular region and right parietal lobe. Mild mass effect without midline shift.  Moderate-marked chronic microangiopathy.      Plan:  - Stroke pathway  Lipid Panel: LDL 61, Cholesterol  123  Hemoglobin A1c 5.5  S/p aspirin 325 mg and Plavix 300 mg once  aspirin/Plavix   Patient states he was taking aspirin 81 mg only Monday-Friday, but not on the weekends  Start Atorvastatin 80 mg daily (statin naive PTA)  Avoid hypotension  Euglycemic, normothermic goal  Continue telemetry  PT/OT/ST  Stroke education  Frequent neuro checks. Continue to monitor and notify neurology with any changes.  STAT CT head for any acute change in neuro exam  - Medical management and supportive care per primary team. Correction of any metabolic or infectious disturbances.   - Reviewed with attending, plan of care per attending physician, please see attestation for details.

## 2024-11-02 NOTE — ASSESSMENT & PLAN NOTE
"Patient presented to the ED for lower extremity weakness, CT of the head showing \"acute infarcts involving the right corona radiata/gangliocapsular region, left gangliocapsular region and right parietal lobe\"  Transthoracic echo without significant abnormalities, neurology recommending ELICEO to rule out thrombus, cardiac mass, or other possible source of multifocal infarct   Continue to monitor on telemetry  Resume aspirin, Plavix, and atorvastatin  "

## 2024-11-03 LAB — DEPRECATED AT III PPP: 68 % OF NORMAL (ref 92–136)

## 2024-11-03 PROCEDURE — 97167 OT EVAL HIGH COMPLEX 60 MIN: CPT

## 2024-11-03 PROCEDURE — 85705 THROMBOPLASTIN INHIBITION: CPT | Performed by: STUDENT IN AN ORGANIZED HEALTH CARE EDUCATION/TRAINING PROGRAM

## 2024-11-03 PROCEDURE — 85732 THROMBOPLASTIN TIME PARTIAL: CPT | Performed by: STUDENT IN AN ORGANIZED HEALTH CARE EDUCATION/TRAINING PROGRAM

## 2024-11-03 PROCEDURE — 85670 THROMBIN TIME PLASMA: CPT | Performed by: STUDENT IN AN ORGANIZED HEALTH CARE EDUCATION/TRAINING PROGRAM

## 2024-11-03 PROCEDURE — 85305 CLOT INHIBIT PROT S TOTAL: CPT | Performed by: STUDENT IN AN ORGANIZED HEALTH CARE EDUCATION/TRAINING PROGRAM

## 2024-11-03 PROCEDURE — 85306 CLOT INHIBIT PROT S FREE: CPT | Performed by: STUDENT IN AN ORGANIZED HEALTH CARE EDUCATION/TRAINING PROGRAM

## 2024-11-03 PROCEDURE — 85613 RUSSELL VIPER VENOM DILUTED: CPT | Performed by: STUDENT IN AN ORGANIZED HEALTH CARE EDUCATION/TRAINING PROGRAM

## 2024-11-03 PROCEDURE — 97163 PT EVAL HIGH COMPLEX 45 MIN: CPT

## 2024-11-03 PROCEDURE — 99232 SBSQ HOSP IP/OBS MODERATE 35: CPT | Performed by: STUDENT IN AN ORGANIZED HEALTH CARE EDUCATION/TRAINING PROGRAM

## 2024-11-03 PROCEDURE — 85303 CLOT INHIBIT PROT C ACTIVITY: CPT | Performed by: STUDENT IN AN ORGANIZED HEALTH CARE EDUCATION/TRAINING PROGRAM

## 2024-11-03 PROCEDURE — 85300 ANTITHROMBIN III ACTIVITY: CPT | Performed by: STUDENT IN AN ORGANIZED HEALTH CARE EDUCATION/TRAINING PROGRAM

## 2024-11-03 RX ORDER — NICOTINE 21 MG/24HR
14 PATCH, TRANSDERMAL 24 HOURS TRANSDERMAL DAILY
Status: DISCONTINUED | OUTPATIENT
Start: 2024-11-03 | End: 2024-11-06 | Stop reason: HOSPADM

## 2024-11-03 RX ORDER — AMLODIPINE BESYLATE 5 MG/1
5 TABLET ORAL DAILY
Status: DISCONTINUED | OUTPATIENT
Start: 2024-11-04 | End: 2024-11-06 | Stop reason: HOSPADM

## 2024-11-03 RX ORDER — PHENOBARBITAL SODIUM 65 MG/ML
60 INJECTION, SOLUTION INTRAMUSCULAR; INTRAVENOUS ONCE AS NEEDED
Status: COMPLETED | OUTPATIENT
Start: 2024-11-03 | End: 2024-11-03

## 2024-11-03 RX ADMIN — NICOTINE 14 MG: 14 PATCH, EXTENDED RELEASE TRANSDERMAL at 10:48

## 2024-11-03 RX ADMIN — PHENOBARBITAL SODIUM 60 MG: 65 INJECTION INTRAMUSCULAR at 17:21

## 2024-11-03 RX ADMIN — LABETALOL HYDROCHLORIDE 10 MG: 5 INJECTION, SOLUTION INTRAVENOUS at 19:46

## 2024-11-03 RX ADMIN — CHLORHEXIDINE GLUCONATE 0.12% ORAL RINSE 15 ML: 1.2 LIQUID ORAL at 08:46

## 2024-11-03 RX ADMIN — THIAMINE HYDROCHLORIDE: 100 INJECTION, SOLUTION INTRAMUSCULAR; INTRAVENOUS at 08:46

## 2024-11-03 RX ADMIN — HEPARIN SODIUM 5000 UNITS: 5000 INJECTION INTRAVENOUS; SUBCUTANEOUS at 21:59

## 2024-11-03 RX ADMIN — CHLORHEXIDINE GLUCONATE 0.12% ORAL RINSE 15 ML: 1.2 LIQUID ORAL at 21:59

## 2024-11-03 RX ADMIN — ATORVASTATIN CALCIUM 80 MG: 80 TABLET, FILM COATED ORAL at 17:21

## 2024-11-03 RX ADMIN — PHENOBARBITAL SODIUM 60 MG: 65 INJECTION INTRAMUSCULAR at 21:59

## 2024-11-03 RX ADMIN — CARVEDILOL 12.5 MG: 12.5 TABLET, FILM COATED ORAL at 08:46

## 2024-11-03 RX ADMIN — PHENOBARBITAL SODIUM 60 MG: 65 INJECTION INTRAMUSCULAR at 03:08

## 2024-11-03 RX ADMIN — HYDROCHLOROTHIAZIDE 12.5 MG: 12.5 TABLET ORAL at 08:46

## 2024-11-03 RX ADMIN — PHENOBARBITAL SODIUM 60 MG: 65 INJECTION INTRAMUSCULAR at 08:46

## 2024-11-03 RX ADMIN — CARVEDILOL 12.5 MG: 12.5 TABLET, FILM COATED ORAL at 17:21

## 2024-11-03 RX ADMIN — CLOPIDOGREL BISULFATE 75 MG: 75 TABLET, FILM COATED ORAL at 08:46

## 2024-11-03 RX ADMIN — HYDRALAZINE HYDROCHLORIDE 5 MG: 20 INJECTION, SOLUTION INTRAMUSCULAR; INTRAVENOUS at 22:06

## 2024-11-03 RX ADMIN — ASPIRIN 81 MG: 81 TABLET, COATED ORAL at 08:46

## 2024-11-03 RX ADMIN — HEPARIN SODIUM 5000 UNITS: 5000 INJECTION INTRAVENOUS; SUBCUTANEOUS at 17:21

## 2024-11-03 RX ADMIN — HEPARIN SODIUM 5000 UNITS: 5000 INJECTION INTRAVENOUS; SUBCUTANEOUS at 05:43

## 2024-11-03 NOTE — PLAN OF CARE
Problem: PHYSICAL THERAPY ADULT  Goal: Performs mobility at highest level of function for planned discharge setting.  See evaluation for individualized goals.  Description: Treatment/Interventions: ADL retraining, Functional transfer training, LE strengthening/ROM, Elevations, Therapeutic exercise, Cognitive reorientation, Bed mobility, Gait training, Spoke to nursing, OT  Equipment Recommended: Walker       See flowsheet documentation for full assessment, interventions and recommendations.  Note: Prognosis: Good  Problem List: Decreased strength, Decreased endurance, Impaired balance, Decreased mobility, Impaired judgement, Decreased safety awareness  Assessment: PT completed evaluation of 64 y.o. male admitted to [facility] on 10/29/2024 with Penetrating atherosclerotic ulcer of aorta (HCC). Patient's current status instabilities include multiple lines, ongoing pain, continuous O2/HR monitoring, regression in function from baseline. Patient  has a past medical history of Alcohol use, Carotid stenosis, asymptomatic, right, Chronic bronchitis (HCC), Current tobacco use, History of CVA (cerebrovascular accident), History of rectal cancer, Obesity, and Pulmonary nodules. At baseline, pt resides alone in 1SH with 0 MISSY and was independent ADLs prior to hospital admission. Patient with R residual deficits, light touch / coordination intact, R > L LE weakness, and R LE 'heaviness'. Patient presents at time of PT evaluation functioning below baseline and currently w/ overall mobility deficits due to: impaired balance, decreased endurance, gait dysfunction, decreased activity tolerance and fall risk. During PT evaluation, patient currently is requiring supervision for bed mobility skills;  supervision for functional transfers and  min assist x2 for ambulation with RW. Patient ambulated 2' to bedside chair with RW, requiring occasional verbal cues for RW management, navigation towards bedside chair, and  improving stride/step length. Further ambulation limited at this time due to b/l knee buckling and generalized LE weakness. Pt left OOB in bedside chair with alarm and all needs met. This patient is functioning below their baseline and is recommended for level I. Patient will benefit from continued skilled PT this admission to achieve maximal function and safety. The patients AM-PAC Basic Mobility Inpatient Short From Raw Score is 15 . Based on AM-PAC scoring and patient presentation, PT currently recommending Level I (Maximum Resource Intensity). Please also refer to the recommendation of the Physical Therapist for safe discharge planning.  Barriers to Discharge: Decreased caregiver support     Rehab Resource Intensity Level, PT: I (Maximum Resource Intensity)    See flowsheet documentation for full assessment.

## 2024-11-03 NOTE — ASSESSMENT & PLAN NOTE
"Patient presenting to the ED for lower extremity weakness, CT head demonstrates \"New hypodense areas involving the bilateral basal ganglia, which may be related to prior infarcts, or worsening chronic microangiopathic changes, but more recent infarcts in these regions cannot be excluded\"  MRI shows \"Acute infarcts involving the right corona radiata/gangliocapsular region, left gangliocapsular region and right parietal lobe. Mild mass effect without midline shift.\"  Case discussed with neurology and we appreciate their expertise, ELICEO recommended given CVAs in multiple circulations.  Discussed with cardiology and we appreciate their assistance in scheduling  "

## 2024-11-03 NOTE — PLAN OF CARE
"  Problem: OCCUPATIONAL THERAPY ADULT  Goal: Performs self-care activities at highest level of function for planned discharge setting.  See evaluation for individualized goals.  Description: Treatment Interventions: ADL retraining, Functional transfer training, UE strengthening/ROM, Endurance training, Cognitive reorientation, Patient/family training, Equipment evaluation/education, Fine motor coordination activities, Compensatory technique education, Energy conservation, Activityengagement, Neuromuscular reeducation          See flowsheet documentation for full assessment, interventions and recommendations.   Note: Limitation: Decreased ADL status, Decreased UE strength, Decreased Safe judgement during ADL, Decreased cognition, Decreased endurance, Decreased fine motor control, Decreased high-level ADLs  Prognosis: Fair  Assessment: Pt is a 65 yo male seen for OT eval s/p adm to [facility] on 10/29/24 dx'd w/ penetrating atherosclerotic ulcer of aorta. Per EMR, Pt also admitted w/ RLE weakness - MRI brain \" Acute infarcts involving the right corona radiata/gangliocapsular region, left gangliocapsular region and right parietal lobe. Mild mass effect without midline shift.\" Pt  has a past medical history of Alcohol use, Carotid stenosis, asymptomatic, right, Chronic bronchitis (HCC), Current tobacco use, History of CVA (cerebrovascular accident), History of rectal cancer, Obesity, and Pulmonary nodules. Pt with active OT orders and activity as tolerated orders. Pt works FT and resides alone in 1SH w/ 0STE. Pt reports having local children that can A PRN. Pt was I w/ ADLS and IADLS, drove, & required no use of DME PTA. Pt is currently demonstrating the following occupational deficits: Min A UB self care, Mod A LB self care and toileting, S transfers, Min Ax2 ambulation w/ RW. These deficits that are impacting pt's baseline areas of occupation are a result of the following impairments: endurance, activity tolerance, " functional mobility, forward functional reach, balance, functional standing tolerance, unsupportive home environment, decreased I w/ ADLS/IADLS, strength, cognitive impairments, decreased safety awareness, decreased insight into deficits, impaired fine motor skills, and coordination deficits. The following Occupational Performance Areas to address include: grooming, bathing/shower, toilet hygiene, dressing, medication management, health maintenance, functional mobility, and clothing management.  Based on the aforementioned OT evaluation, functional performance deficits, and assessments, pt has been identified as a high complexity evaluation. Recommend  level I Maximum Resource Intensity  upon D/C. The patient's raw score on the -PAC Daily Activity Inpatient Short Form is 15. A raw score of less than 19 suggests the patient may benefit from discharge to post-acute rehabilitation services. Please refer to the recommendation of the Occupational Therapist for safe discharge planning.  Pt to continue to benefit from acute immediate OT services to address the following goals 2-3x/week to  w/in 10-14 days:     Rehab Resource Intensity Level, OT: I (Maximum Resource Intensity) (rehab)

## 2024-11-03 NOTE — NURSING NOTE
Pt. was control team overnight for agitation and needed redirection multiple times throughout the night. Provider made aware during control team and one-time dose phenobarbital ordered.

## 2024-11-03 NOTE — ASSESSMENT & PLAN NOTE
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  No plans for surgical management at this time  Blood pressure appear generally better controlled now that patient is out of withdrawals.  Will add amlodipine to recently started HCTZ

## 2024-11-03 NOTE — PROGRESS NOTES
"Progress Note - Hospitalist   Name: Vlad Gregorio 64 y.o. male I MRN: [id number]  Unit/Bed#: PPHP-317-01 I Date of Admission: 10/29/2024   Date of Service: 11/3/2024 I Hospital Day: 5    Assessment & Plan  Penetrating atherosclerotic ulcer of aorta (HCC)  CT concerning for penetrating aortic arch ulcer without evidence of extravasation or dissection  Patient has been evaluated by cardiothoracic surgery and we appreciate their expertise.  No plans for surgical management at this time  Blood pressure appear generally better controlled now that patient is out of withdrawals.  Will add amlodipine to recently started HCTZ  Tobacco abuse  Ordered Nicotine patch  Right leg weakness  Patient presenting to the ED for lower extremity weakness, CT head demonstrates \"New hypodense areas involving the bilateral basal ganglia, which may be related to prior infarcts, or worsening chronic microangiopathic changes, but more recent infarcts in these regions cannot be excluded\"  MRI shows \"Acute infarcts involving the right corona radiata/gangliocapsular region, left gangliocapsular region and right parietal lobe. Mild mass effect without midline shift.\"  Case discussed with neurology and we appreciate their expertise, ELICEO recommended given CVAs in multiple circulations.  Discussed with cardiology and we appreciate their assistance in scheduling  Alcohol abuse  On admission patient reported drinking a case of beer over several days with a course of a week.  He appears to be going through withdrawals and has since admitted that he drinks about half a case of beer a day  Patient has been given benzodiazepine medications for his withdrawal symptoms with apparent paradoxical agitation  Patient was placed on ICU level of care on Precedex infusion and has since been weaned.  He is resting comfortably in bed and has no new complaints  Continue thiamine, folate.  Certified  consult placed  CVA (cerebral vascular accident) " "(HCC)  Patient presented to the ED for lower extremity weakness, CT of the head showing \"acute infarcts involving the right corona radiata/gangliocapsular region, left gangliocapsular region and right parietal lobe\"  Transthoracic echo without significant abnormalities, neurology recommending ELICEO to rule out thrombus, cardiac mass, or other possible source of multifocal infarct   Continue to monitor on telemetry  Resume aspirin, Plavix, and atorvastatin    VTE Pharmacologic Prophylaxis:   Moderate Risk (Score 3-4) - Pharmacological DVT Prophylaxis Ordered: heparin.    Mobility:   Basic Mobility Inpatient Raw Score: 17  JH-HLM Goal: 5: Stand one or more mins  JH-HLM Achieved: 6: Walk 10 steps or more  JH-HLM Goal achieved. Continue to encourage appropriate mobility.    Patient Centered Rounds: I performed bedside rounds with nursing staff today.   Discussions with Specialists or Other Care Team Provider: N/A    Education and Discussions with Family / Patient: Updated  (daughter) at bedside.    Current Length of Stay: 5 day(s)  Current Patient Status: Inpatient   Certification Statement: The patient will continue to require additional inpatient hospital stay due to CVA  Discharge Plan: Anticipate discharge in 24-48 hrs to discharge location to be determined pending rehab evaluations.    Code Status: Level 1 - Full Code    Subjective   Patient seen and examined at bedside this morning.  Vlad states he is feeling well and has no new complaints.  Blood pressure appearing generally better controlled. Case discussed with daughter at bedside    Objective :  Temp:  [98.4 °F (36.9 °C)-99 °F (37.2 °C)] 98.9 °F (37.2 °C)  HR:  [64-78] 66  BP: (118-151)/(53-81) 133/75  Resp:  [16-18] 16  SpO2:  [92 %-98 %] 97 %  O2 Device: None (Room air)    Body mass index is 31.49 kg/m².     Input and Output Summary (last 24 hours):     Intake/Output Summary (Last 24 hours) at 11/3/2024 9618  Last data filed at 11/3/2024 " 1224  Gross per 24 hour   Intake 605 ml   Output 675 ml   Net -70 ml       Physical Exam  Vitals and nursing note reviewed.   Constitutional:       General: He is not in acute distress.     Appearance: He is well-developed. He is not diaphoretic.   HENT:      Head: Normocephalic and atraumatic.      Mouth/Throat:      Mouth: Mucous membranes are moist.   Eyes:      General: No scleral icterus.     Extraocular Movements: Extraocular movements intact.      Conjunctiva/sclera: Conjunctivae normal.   Cardiovascular:      Rate and Rhythm: Normal rate and regular rhythm.      Pulses: Normal pulses.      Heart sounds: No murmur heard.     No friction rub. No gallop.   Pulmonary:      Effort: Pulmonary effort is normal. No respiratory distress.      Breath sounds: No wheezing or rhonchi.   Abdominal:      General: Abdomen is flat. Bowel sounds are normal. There is no distension.      Palpations: Abdomen is soft.      Tenderness: There is no abdominal tenderness. There is no guarding or rebound.   Musculoskeletal:         General: No swelling.      Right lower leg: No edema.      Left lower leg: No edema.   Skin:     General: Skin is warm.      Capillary Refill: Capillary refill takes less than 2 seconds.      Coloration: Skin is not jaundiced.      Findings: No rash.   Neurological:      Mental Status: He is alert and oriented to person, place, and time.   Psychiatric:         Mood and Affect: Mood normal.           Lines/Drains:        Telemetry:  Telemetry Orders (From admission, onward)               24 Hour Telemetry Monitoring  Continuous x 24 Hours (Telem)           Question:  Reason for 24 Hour Telemetry  Answer:  TIA/Suspected CVA/ Confirmed CVA                     Telemetry Reviewed: Normal Sinus Rhythm  Indication for Continued Telemetry Use: Acute CVA               Lab Results: I have reviewed the following results:   Results from last 7 days   Lab Units 24  0444   WBC Thousand/uL 5.96   HEMOGLOBIN  g/dL 13.4   HEMATOCRIT % 39.9   PLATELETS Thousands/uL 182   SEGS PCT % 81*   LYMPHO PCT % 9*   MONO PCT % 9   EOS PCT % 0     Results from last 7 days   Lab Units 11/02/24  0443 11/01/24  0511 10/31/24  0604   SODIUM mmol/L 136   < > 135   POTASSIUM mmol/L 3.7   < > 4.0   CHLORIDE mmol/L 107   < > 103   CO2 mmol/L 23   < > 25   BUN mg/dL 20   < > 14   CREATININE mg/dL 0.91   < > 0.94   ANION GAP mmol/L 6   < > 7   CALCIUM mg/dL 8.4   < > 8.4   ALBUMIN g/dL  --   --  3.8   TOTAL BILIRUBIN mg/dL  --   --  1.05*   ALK PHOS U/L  --   --  50   ALT U/L  --   --  14   AST U/L  --   --  19   GLUCOSE RANDOM mg/dL 88   < > 96    < > = values in this interval not displayed.     Results from last 7 days   Lab Units 10/30/24  0447   INR  1.15     Results from last 7 days   Lab Units 10/30/24  0726 10/30/24  0446 10/29/24  1432   POC GLUCOSE mg/dl 106 116 132     Results from last 7 days   Lab Units 10/30/24  0447   HEMOGLOBIN A1C % 5.5     Results from last 7 days   Lab Units 11/01/24  0642   LACTIC ACID mmol/L 0.6       Recent Cultures (last 7 days):               Last 24 Hours Medication List:     Current Facility-Administered Medications:     [START ON 11/4/2024] amLODIPine (NORVASC) tablet 5 mg, Daily    aspirin (ECOTRIN LOW STRENGTH) EC tablet 81 mg, Daily    atorvastatin (LIPITOR) tablet 80 mg, Daily With Dinner    carvedilol (COREG) tablet 12.5 mg, BID With Meals    chlorhexidine (PERIDEX) 0.12 % oral rinse 15 mL, Q12H SHEA    clopidogrel (PLAVIX) tablet 75 mg, Daily    folic acid 1 mg, thiamine (VITAMIN B1) 100 mg in sodium chloride 0.9 % 100 mL IV piggyback, Daily, Last Rate: 0 mL/hr at 11/01/24 1005    heparin (porcine) subcutaneous injection 5,000 Units, Q8H SHEA    hydrALAZINE (APRESOLINE) injection 5 mg, Q6H PRN    hydroCHLOROthiazide tablet 12.5 mg, Daily    labetalol (NORMODYNE) injection 10 mg, Q4H PRN    nicotine (NICODERM CQ) 14 mg/24hr TD 24 hr patch 14 mg, Daily    [COMPLETED] PHENobarbital injection 90 mg,  TID **FOLLOWED BY** PHENobarbital injection 60 mg, TID **FOLLOWED BY** [START ON 11/5/2024] PHENobarbital injection 30 mg, TID **FOLLOWED BY** [START ON 11/7/2024] PHENobarbital injection 15 mg, TID    Administrative Statements   Today, Patient Was Seen By: Oliver Sewell      **Please Note: This note may have been constructed using a voice recognition system.**

## 2024-11-03 NOTE — OCCUPATIONAL THERAPY NOTE
Occupational Therapy Evaluation      Vlad Gregorio    11/3/2024    Principal Problem:    Penetrating atherosclerotic ulcer of aorta (HCC)  Active Problems:    Tobacco abuse    Right leg weakness    Alcohol abuse    CVA (cerebral vascular accident) (HCC)      Past Medical History:   Diagnosis Date    Alcohol use     no known liver dysfunction    Carotid stenosis, asymptomatic, right     40-50% JOSE at bifurcation/bulb    Chronic bronchitis (HCC)     Current tobacco use     History of CVA (cerebrovascular accident)     thalmic, right occipital, b/l basal ganglia    History of rectal cancer     s/p resection    Obesity     Pulmonary nodules     right apical/RUL       Past Surgical History:   Procedure Laterality Date    RECTAL BIOPSY      RECTAL SURGERY      lower anterior resection/LAR        11/03/24 1100   OT Last Visit   OT Visit Date 11/03/24   Note Type   Note type Evaluation   Pain Assessment   Pain Assessment Tool 0-10   Pain Score No Pain   Restrictions/Precautions   Weight Bearing Precautions Per Order No   Other Precautions Cognitive;Chair Alarm;Bed Alarm;Multiple lines;Telemetry;Fall Risk;Pain  (h/o CVA)   Home Living   Type of Home House   Home Layout One level;Performs ADLs on one level;Able to live on main level with bedroom/bathroom  (0STE)   Bathroom Shower/Tub Tub/shower unit   Bathroom Toilet Standard   Bathroom Equipment   (denies DME)   Bathroom Accessibility Accessible   Home Equipment   (no DME)   Prior Function   Level of [place] Independent with ADLs;Independent with functional mobility;Independent with IADLS   Lives With (S)  Alone   Receives Help From Family;Friend(s)   IADLs Independent with driving;Independent with meal prep;Independent with medication management   Falls in the last 6 months 0   Vocational Full time employment   Lifestyle   Autonomy Pt reports being IND w/ all ADLS and IADLS; (+) drives; ambulates w/o AD PTA   Reciprocal Relationships Pt reports residing alone and has  "local children that can A PNR.   Service to Others Pt works FT as a    Intrinsic Gratification None stated   General   Family/Caregiver Present Yes  (dtr Nargis)   ADL   Where Assessed Edge of bed   Eating Assistance 5  Supervision/Setup   Grooming Assistance 5  Supervision/Setup   UB Bathing Assistance 4  Minimal Assistance   LB Bathing Assistance 3  Moderate Assistance   UB Dressing Assistance 4  Minimal Assistance   LB Dressing Assistance 3  Moderate Assistance   Toileting Assistance  3  Moderate Assistance   Bed Mobility   Supine to Sit 5  Supervision   Additional items Assist x 1;Increased time required;Verbal cues;Bedrails;HOB elevated   Sit to Supine Unable to assess   Additional Comments Pt laying supine in bed upon OT arrival. Pt seated OOB in chair w/ chair alarm activated and all needs in reach s/p OT session   Transfers   Sit to Stand 5  Supervision   Additional items Assist x 1;Increased time required;Verbal cues;Armrests   Stand to Sit 5  Supervision   Additional items Assist x 1;Increased time required;Verbal cues;Armrests   Additional Comments transfers w/ RW   Functional Mobility   Functional Mobility 4  Minimal assistance  (x2)   Additional Comments Pt demonstrated ability to take few steps EOB>chair w/ RW at Min Ax2 level. Pt reports R leg feels \"very heavy.\"   Additional items Rolling walker   Balance   Static Sitting Fair +   Dynamic Sitting Fair   Static Standing Fair -   Dynamic Standing Poor +   Ambulatory Poor +   Activity Tolerance   Activity Tolerance Patient limited by fatigue;Patient tolerated treatment well   Medical Staff Made Aware PT Nathaly;   Nurse Made Aware RN cleared Pt for OT session   RUE Assessment   RUE Assessment WFL   LUE Assessment   LUE Assessment WFL   Hand Function   Gross Motor Coordination Functional   Psychosocial   Psychosocial (WDL) X   Patient Behaviors/Mood Flat affect;Cooperative   Cognition   Overall Cognitive Status Impaired " "  Arousal/Participation Alert;Cooperative   Attention Attends with cues to redirect   Orientation Level Oriented to person;Oriented to place;Disoriented to time;Oriented to situation  (oriented to year only)   Memory Decreased recall of precautions;Decreased recall of recent events   Following Commands Follows one step commands with increased time or repetition   Comments Pt is pleasant and cooperative t/o session. Pt w/ flat affect. Pt required VC for all safety and sequencing. Pt presents w/ mild confusion   Assessment   Limitation Decreased ADL status;Decreased UE strength;Decreased Safe judgement during ADL;Decreased cognition;Decreased endurance;Decreased fine motor control;Decreased high-level ADLs   Prognosis Fair   Assessment Pt is a 65 yo male seen for OT eval s/p adm to [facility] on 10/29/24 dx'd w/ penetrating atherosclerotic ulcer of aorta. Per EMR, Pt also admitted w/ RLE weakness - MRI brain \" Acute infarcts involving the right corona radiata/gangliocapsular region, left gangliocapsular region and right parietal lobe. Mild mass effect without midline shift.\" Pt  has a past medical history of Alcohol use, Carotid stenosis, asymptomatic, right, Chronic bronchitis (HCC), Current tobacco use, History of CVA (cerebrovascular accident), History of rectal cancer, Obesity, and Pulmonary nodules. Pt with active OT orders and activity as tolerated orders. Pt works FT and resides alone in 1SH w/ 0STE. Pt reports having local children that can A PRN. Pt was I w/ ADLS and IADLS, drove, & required no use of DME PTA. Pt is currently demonstrating the following occupational deficits: Min A UB self care, Mod A LB self care and toileting, S transfers, Min Ax2 ambulation w/ RW. These deficits that are impacting pt's baseline areas of occupation are a result of the following impairments: endurance, activity tolerance, functional mobility, forward functional reach, balance, functional standing tolerance, unsupportive home " environment, decreased I w/ ADLS/IADLS, strength, cognitive impairments, decreased safety awareness, decreased insight into deficits, impaired fine motor skills, and coordination deficits. The following Occupational Performance Areas to address include: grooming, bathing/shower, toilet hygiene, dressing, medication management, health maintenance, functional mobility, and clothing management.  Based on the aforementioned OT evaluation, functional performance deficits, and assessments, pt has been identified as a high complexity evaluation. Recommend  level I Maximum Resource Intensity  upon D/C. The patient's raw score on the AM-PAC Daily Activity Inpatient Short Form is 15. A raw score of less than 19 suggests the patient may benefit from discharge to post-acute rehabilitation services. Please refer to the recommendation of the Occupational Therapist for safe discharge planning.  Pt to continue to benefit from acute immediate OT services to address the following goals 2-3x/week to  w/in 10-14 days:   Goals   Patient Goals To sit in the chair   LTG Time Frame 10-   Long Term Goal #1 Refer to goals below   Plan   Treatment Interventions ADL retraining;Functional transfer training;UE strengthening/ROM;Endurance training;Cognitive reorientation;Patient/family training;Equipment evaluation/education;Fine motor coordination activities;Compensatory technique education;Energy conservation;Activityengagement;Neuromuscular reeducation   Goal Expiration Date 24   OT Frequency 2-3x/wk   Discharge Recommendation   Rehab Resource Intensity Level, OT I (Maximum Resource Intensity)  (rehab)   AM-PAC Daily Activity Inpatient   Lower Body Dressing 2   Bathing 2   Toileting 2   Upper Body Dressing 3   Grooming 3   Eating 3   Daily Activity Raw Score 15   Daily Activity Standardized Score (Calc for Raw Score >=11) 34.69   -PAC Applied Cognition Inpatient   Following a Speech/Presentation 3   Understanding Ordinary  Conversation 4   Taking Medications 3   Remembering Where Things Are Placed or Put Away 3   Remembering List of 4-5 Errands 3   Taking Care of Complicated Tasks 2   Applied Cognition Raw Score 18   Applied Cognition Standardized Score 38.07     GOALS      1) Pt will imrpove activity tolerance to G for min 30 min txment sessions  2) Pt will complete ADLs/self care w/ mod I  3) Pt will complete toileting w/ mod I w/ G hygiene/thoroughness using DME PRN  4) Pt will improve fx'l tfers on/off all surfaces using dME PRN w/ G balance/safety including toileting w/ mod I  5) Pt will improve fx'l mobility during I/ADl/leisure tasks using DME PRN w/ g balance/safety w/ mod I  6) Pt will engage in ongoing cognitive assessment w/ G participation w/ mod I to A w/ safe d/c planning/recommendations  7) Pt will demonstrate G carryover of pt/caregiver education and training as appropriate w/ mod I w/o cues w/ G tolerance  8) Pt will engage in depression screen/leisure interest checklist w/ mod I and G participation to monitor s/s depression and ID 3 positive coping strategies to A w/ emotional regulation and management  9) Pt will improve UB fx'l use/ROM to WFL and strength 1/2 MMT via AROM/AAROM/PROM in all planes as tolerated s/p skilled education of HEP w/ mod I w/o cues for carryover  11) Pt will engage in ongoing  assessments, screens, and activities t/o fx'l I/ADL/leisure tasks w/ G participation and mod I to A w/ adaptation and accomodations or rule out visual perceptual impairments  12) Pt will follow 100% simple one step verbal commands and be A/Ox4 consistently t/o use of external environmental cues w/ mod I        Chiquita Pearl MS, OTR/L

## 2024-11-03 NOTE — PHYSICAL THERAPY NOTE
Physical Therapy Evaluation     Patient's Name: Vlad Gregorio    Admitting Diagnosis  Weakness of right lower extremity [R29.898]    Problem List  Patient Active Problem List   Diagnosis    Syncope and collapse    Tobacco abuse    Right leg weakness    Alcohol abuse    Penetrating atherosclerotic ulcer of aorta (HCC)    CVA (cerebral vascular accident) (HCC)       Past Medical History  Past Medical History:   Diagnosis Date    Alcohol use     no known liver dysfunction    Carotid stenosis, asymptomatic, right     40-50% JOSE at bifurcation/bulb    Chronic bronchitis (HCC)     Current tobacco use     History of CVA (cerebrovascular accident)     thalmic, right occipital, b/l basal ganglia    History of rectal cancer     s/p resection    Obesity     Pulmonary nodules     right apical/RUL       Past Surgical History  Past Surgical History:   Procedure Laterality Date    RECTAL BIOPSY      RECTAL SURGERY      lower anterior resection/LAR          11/03/24 1101   PT Last Visit   PT Visit Date 11/03/24   Note Type   Note type Evaluation   Additional Comments 64 y.o. male admitted to [facility] on 10/29/2024 with Penetrating atherosclerotic ulcer of aorta (HCC).   Pain Assessment   Pain Assessment Tool 0-10   Pain Score No Pain   Restrictions/Precautions   Weight Bearing Precautions Per Order No   Other Precautions Cognitive;Chair Alarm;Bed Alarm;Multiple lines;Telemetry;Fall Risk  (hx of CVA)   Home Living   Type of Home House   Home Layout One level;Performs ADLs on one level;Able to live on main level with bedroom/bathroom  (0 MISSY)   Bathroom Shower/Tub Tub/shower unit   Bathroom Toilet Standard   Bathroom Accessibility Accessible   Home Equipment Other (Comment)  (no DME)   Additional Comments At baseline, pt resides alone in 1SH with 0 MISSY and was independent ADLs prior to hospital admission   Prior Function   Level of [place] Independent with ADLs;Independent with functional mobility;Independent  "with IADLS   Lives With Alone   Receives Help From Family;Friend(s)   IADLs Independent with driving;Independent with meal prep;Independent with medication management   Falls in the last 6 months 0   Vocational Full time employment  ()   General   Additional Pertinent History Patient  has a past medical history of Alcohol use, Carotid stenosis, asymptomatic, right, Chronic bronchitis (HCC), Current tobacco use, History of CVA (cerebrovascular accident), History of rectal cancer, Obesity, and Pulmonary nodules.   Family/Caregiver Present Yes  (daughter)   Cognition   Overall Cognitive Status Impaired   Arousal/Participation Alert   Orientation Level Oriented to person;Oriented to place;Disoriented to time;Disoriented to situation   Memory Decreased recall of recent events;Decreased recall of precautions   Following Commands Follows one step commands without difficulty   Comments patient pleasant and cooperative   Subjective   Subjective \"I haven't been up much\"   RLE Assessment   RLE Assessment   (3+ / 5 grossly)   LLE Assessment   LLE Assessment   (4-/5 grossly)   Light Touch   RLE Light Touch Grossly intact   LLE Light Touch Grossly intact   Bed Mobility   Supine to Sit 5  Supervision   Additional items HOB elevated;Bedrails;Increased time required;Verbal cues   Sit to Supine Unable to assess   Additional Comments patient left OOB in bedside chair with all needs met   Transfers   Sit to Stand 5  Supervision   Additional items Increased time required;Verbal cues   Stand to Sit 5  Supervision   Additional items Increased time required;Verbal cues   Additional Comments x2 STS with RW   Ambulation/Elevation   Gait pattern Forward Flexion;Decreased foot clearance;Shuffling;Short stride;Step to;Excessively slow;R Knee Viraj;L Knee Viraj   Gait Assistance 4  Minimal assist   Additional items Assist x 2;Verbal cues;Tactile cues   Assistive Device Rolling walker   Distance 2'   Stair Management " Assistance Not tested   Ambulation/Elevation Additional Comments Patient ambulated 2' to bedside chair with RW, requiring occasional verbal cues for RW management, navigation towards bedside chair, and improving stride/step length. Further ambulation limited at this time due to b/l knee buckling and generalized LE weakness   Balance   Static Sitting Fair +   Dynamic Sitting Fair   Static Standing Fair -   Dynamic Standing Poor +   Ambulatory Poor +   Endurance Deficit   Endurance Deficit Yes   Endurance Deficit Description generalized LE weakness, fatigue, hx of CVA   Activity Tolerance   Activity Tolerance Patient tolerated treatment well;Patient limited by fatigue   Medical Staff Made Aware LOBITO Porras; This high complexity evaluation was performed with an occupational therapist due to the patient's co-morbidities, clinically unstable presentation, and present impairments which are a regression from the patient's baseline.   Nurse Made Aware RN Cleared and updated   Assessment   Prognosis Good   Problem List Decreased strength;Decreased endurance;Impaired balance;Decreased mobility;Impaired judgement;Decreased safety awareness   Assessment PT completed evaluation of 64 y.o. male admitted to [facility] on 10/29/2024 with Penetrating atherosclerotic ulcer of aorta (HCC). Patient's current status instabilities include multiple lines, ongoing pain, continuous O2/HR monitoring, regression in function from baseline. Patient  has a past medical history of Alcohol use, Carotid stenosis, asymptomatic, right, Chronic bronchitis (HCC), Current tobacco use, History of CVA (cerebrovascular accident), History of rectal cancer, Obesity, and Pulmonary nodules. At baseline, pt resides alone in 1SH with 0 MISSY and was independent ADLs prior to hospital admission.       Patient with R residual deficits, light touch / coordination intact, R > L LE weakness, and R LE 'heaviness'. Patient presents at time of PT evaluation  functioning below baseline and currently w/ overall mobility deficits due to: impaired balance, decreased endurance, gait dysfunction, decreased activity tolerance and fall risk. During PT evaluation, patient currently is requiring supervision for bed mobility skills;  supervision for functional transfers and  min assist x2 for ambulation with RW. Patient ambulated 2' to bedside chair with RW, requiring occasional verbal cues for RW management, navigation towards bedside chair, and improving stride/step length. Further ambulation limited at this time due to b/l knee buckling and generalized LE weakness. Pt left OOB in bedside chair with alarm and all needs met.       This patient is functioning below their baseline and is recommended for level I. Patient will benefit from continued skilled PT this admission to achieve maximal function and safety. The patients AM-PAC Basic Mobility Inpatient Short From Raw Score is 15 . Based on AM-PAC scoring and patient presentation, PT currently recommending Level I (Maximum Resource Intensity). Please also refer to the recommendation of the Physical Therapist for safe discharge planning.   Barriers to Discharge Decreased caregiver support   Goals   Patient Goals to get better   STG Expiration Date 11/17/24   Short Term Goal #1 1) Perform bed mobility mod-I to participate in frequent repositioning and improve skin integrity; 2) Perform functional transfers mod-I to promote I with toileting and OOB mobility; 3) Ambulate 200 feet mod-I with least restrictive device to participate in household and community level mobility; 4) Improve b/l LE strength by 1/2 grade in order to improve efficiency of tranfers; 5) Improve balance by 1 grade to reduce risk for falls; 6) Improve overall activity tolerance to 60 minutes in order to increase patient's ability to engage in mobility tasks   PT Treatment Day 0   Plan   Treatment/Interventions ADL retraining;Functional transfer training;LE  strengthening/ROM;Elevations;Therapeutic exercise;Cognitive reorientation;Bed mobility;Gait training;Spoke to nursing;OT   PT Frequency 3-5x/wk   Discharge Recommendation   Rehab Resource Intensity Level, PT I (Maximum Resource Intensity)   Equipment Recommended Walker   Walker Package Recommended Wheeled walker   Change/add to Walker Package? No   AM-PAC Basic Mobility Inpatient   Turning in Flat Bed Without Bedrails 3   Lying on Back to Sitting on Edge of Flat Bed Without Bedrails 3   Moving Bed to Chair 3   Standing Up From Chair Using Arms 3   Walk in Room 2   Climb 3-5 Stairs With Railing 1   Basic Mobility Inpatient Raw Score 15   Basic Mobility Standardized Score 36.97   [facility] Highest Level Of Mobility   -[facility] Goal 4: Move to chair/commode   -HL Achieved 5: Stand (1 or more minutes)   End of Consult   Patient Position at End of Consult Bedside chair;Bed/Chair alarm activated;All needs within reach         Nathaly Erickson, PT, DPT

## 2024-11-04 ENCOUNTER — APPOINTMENT (INPATIENT)
Dept: NON INVASIVE DIAGNOSTICS | Facility: HOSPITAL | Age: 64
DRG: 064 | End: 2024-11-04
Attending: STUDENT IN AN ORGANIZED HEALTH CARE EDUCATION/TRAINING PROGRAM
Payer: COMMERCIAL

## 2024-11-04 ENCOUNTER — ANESTHESIA EVENT (INPATIENT)
Dept: NON INVASIVE DIAGNOSTICS | Facility: HOSPITAL | Age: 64
DRG: 064 | End: 2024-11-04
Payer: COMMERCIAL

## 2024-11-04 LAB
ANION GAP SERPL CALCULATED.3IONS-SCNC: 9 MMOL/L (ref 4–13)
AORTIC ROOT: 4.1 CM
ASCENDING AORTA: 3.6 CM
BUN SERPL-MCNC: 15 MG/DL (ref 5–25)
CALCIUM SERPL-MCNC: 8.8 MG/DL (ref 8.4–10.2)
CHLORIDE SERPL-SCNC: 98 MMOL/L (ref 96–108)
CO2 SERPL-SCNC: 25 MMOL/L (ref 21–32)
CREAT SERPL-MCNC: 0.87 MG/DL (ref 0.6–1.3)
ERYTHROCYTE [DISTWIDTH] IN BLOOD BY AUTOMATED COUNT: 12.8 % (ref 11.6–15.1)
GFR SERPL CREATININE-BSD FRML MDRD: 91 ML/MIN/1.73SQ M
GLUCOSE SERPL-MCNC: 88 MG/DL (ref 65–140)
HCT VFR BLD AUTO: 37.9 % (ref 36.5–49.3)
HGB BLD-MCNC: 13.3 G/DL (ref 12–17)
MAGNESIUM SERPL-MCNC: 1.9 MG/DL (ref 1.9–2.7)
MCH RBC QN AUTO: 29.6 PG (ref 26.8–34.3)
MCHC RBC AUTO-ENTMCNC: 35.1 G/DL (ref 31.4–37.4)
MCV RBC AUTO: 84 FL (ref 82–98)
PLATELET # BLD AUTO: 172 THOUSANDS/UL (ref 149–390)
PMV BLD AUTO: 9.8 FL (ref 8.9–12.7)
POTASSIUM SERPL-SCNC: 3.8 MMOL/L (ref 3.5–5.3)
PROT C AG ACT/NOR PPP IA: 70 % OF NORMAL (ref 60–150)
PROT S ACT/NOR PPP: 91 % (ref 71–117)
RBC # BLD AUTO: 4.5 MILLION/UL (ref 3.88–5.62)
SL CV LV EF: 55
SODIUM SERPL-SCNC: 132 MMOL/L (ref 135–147)
WBC # BLD AUTO: 4.89 THOUSAND/UL (ref 4.31–10.16)

## 2024-11-04 PROCEDURE — 93325 DOPPLER ECHO COLOR FLOW MAPG: CPT | Performed by: STUDENT IN AN ORGANIZED HEALTH CARE EDUCATION/TRAINING PROGRAM

## 2024-11-04 PROCEDURE — 93321 DOPPLER ECHO F-UP/LMTD STD: CPT | Performed by: STUDENT IN AN ORGANIZED HEALTH CARE EDUCATION/TRAINING PROGRAM

## 2024-11-04 PROCEDURE — 93312 ECHO TRANSESOPHAGEAL: CPT

## 2024-11-04 PROCEDURE — 97530 THERAPEUTIC ACTIVITIES: CPT

## 2024-11-04 PROCEDURE — B24BZZ4 ULTRASONOGRAPHY OF HEART WITH AORTA, TRANSESOPHAGEAL: ICD-10-PCS | Performed by: STUDENT IN AN ORGANIZED HEALTH CARE EDUCATION/TRAINING PROGRAM

## 2024-11-04 PROCEDURE — 83735 ASSAY OF MAGNESIUM: CPT | Performed by: STUDENT IN AN ORGANIZED HEALTH CARE EDUCATION/TRAINING PROGRAM

## 2024-11-04 PROCEDURE — 80048 BASIC METABOLIC PNL TOTAL CA: CPT | Performed by: STUDENT IN AN ORGANIZED HEALTH CARE EDUCATION/TRAINING PROGRAM

## 2024-11-04 PROCEDURE — 99232 SBSQ HOSP IP/OBS MODERATE 35: CPT | Performed by: STUDENT IN AN ORGANIZED HEALTH CARE EDUCATION/TRAINING PROGRAM

## 2024-11-04 PROCEDURE — 97535 SELF CARE MNGMENT TRAINING: CPT

## 2024-11-04 PROCEDURE — 93312 ECHO TRANSESOPHAGEAL: CPT | Performed by: STUDENT IN AN ORGANIZED HEALTH CARE EDUCATION/TRAINING PROGRAM

## 2024-11-04 PROCEDURE — 85027 COMPLETE CBC AUTOMATED: CPT | Performed by: STUDENT IN AN ORGANIZED HEALTH CARE EDUCATION/TRAINING PROGRAM

## 2024-11-04 PROCEDURE — 99233 SBSQ HOSP IP/OBS HIGH 50: CPT | Performed by: PSYCHIATRY & NEUROLOGY

## 2024-11-04 PROCEDURE — 97116 GAIT TRAINING THERAPY: CPT

## 2024-11-04 RX ORDER — PROPOFOL 10 MG/ML
INJECTION, EMULSION INTRAVENOUS AS NEEDED
Status: DISCONTINUED | OUTPATIENT
Start: 2024-11-04 | End: 2024-11-04

## 2024-11-04 RX ORDER — PROPOFOL 10 MG/ML
INJECTION, EMULSION INTRAVENOUS CONTINUOUS PRN
Status: DISCONTINUED | OUTPATIENT
Start: 2024-11-04 | End: 2024-11-04

## 2024-11-04 RX ORDER — GLYCOPYRROLATE 0.2 MG/ML
INJECTION INTRAMUSCULAR; INTRAVENOUS AS NEEDED
Status: DISCONTINUED | OUTPATIENT
Start: 2024-11-04 | End: 2024-11-04

## 2024-11-04 RX ORDER — LANOLIN ALCOHOL/MO/W.PET/CERES
100 CREAM (GRAM) TOPICAL DAILY
Status: DISCONTINUED | OUTPATIENT
Start: 2024-11-04 | End: 2024-11-06 | Stop reason: HOSPADM

## 2024-11-04 RX ORDER — SODIUM CHLORIDE 9 MG/ML
INJECTION, SOLUTION INTRAVENOUS CONTINUOUS PRN
Status: DISCONTINUED | OUTPATIENT
Start: 2024-11-04 | End: 2024-11-04

## 2024-11-04 RX ORDER — LANOLIN ALCOHOL/MO/W.PET/CERES
800 CREAM (GRAM) TOPICAL DAILY
Status: DISCONTINUED | OUTPATIENT
Start: 2024-11-04 | End: 2024-11-06 | Stop reason: HOSPADM

## 2024-11-04 RX ORDER — HYDRALAZINE HYDROCHLORIDE 20 MG/ML
5 INJECTION INTRAMUSCULAR; INTRAVENOUS EVERY 4 HOURS PRN
Status: DISCONTINUED | OUTPATIENT
Start: 2024-11-04 | End: 2024-11-06 | Stop reason: HOSPADM

## 2024-11-04 RX ORDER — HYDRALAZINE HYDROCHLORIDE 20 MG/ML
5 INJECTION INTRAMUSCULAR; INTRAVENOUS ONCE
Status: COMPLETED | OUTPATIENT
Start: 2024-11-04 | End: 2024-11-04

## 2024-11-04 RX ADMIN — THIAMINE HCL TAB 100 MG 100 MG: 100 TAB at 11:39

## 2024-11-04 RX ADMIN — ASPIRIN 81 MG: 81 TABLET, COATED ORAL at 08:19

## 2024-11-04 RX ADMIN — AMLODIPINE BESYLATE 5 MG: 5 TABLET ORAL at 08:19

## 2024-11-04 RX ADMIN — CHLORHEXIDINE GLUCONATE 0.12% ORAL RINSE 15 ML: 1.2 LIQUID ORAL at 08:18

## 2024-11-04 RX ADMIN — CLOPIDOGREL BISULFATE 75 MG: 75 TABLET, FILM COATED ORAL at 08:19

## 2024-11-04 RX ADMIN — PHENOBARBITAL SODIUM 60 MG: 65 INJECTION INTRAMUSCULAR at 21:40

## 2024-11-04 RX ADMIN — THIAMINE HYDROCHLORIDE: 100 INJECTION, SOLUTION INTRAMUSCULAR; INTRAVENOUS at 08:28

## 2024-11-04 RX ADMIN — PHENOBARBITAL SODIUM 60 MG: 65 INJECTION INTRAMUSCULAR at 08:19

## 2024-11-04 RX ADMIN — PHENOBARBITAL SODIUM 60 MG: 65 INJECTION INTRAMUSCULAR at 17:26

## 2024-11-04 RX ADMIN — CARVEDILOL 12.5 MG: 12.5 TABLET, FILM COATED ORAL at 08:19

## 2024-11-04 RX ADMIN — HEPARIN SODIUM 5000 UNITS: 5000 INJECTION INTRAVENOUS; SUBCUTANEOUS at 21:40

## 2024-11-04 RX ADMIN — LABETALOL HYDROCHLORIDE 10 MG: 5 INJECTION, SOLUTION INTRAVENOUS at 19:19

## 2024-11-04 RX ADMIN — PROPOFOL 70 MG: 10 INJECTION, EMULSION INTRAVENOUS at 13:23

## 2024-11-04 RX ADMIN — CHLORHEXIDINE GLUCONATE 0.12% ORAL RINSE 15 ML: 1.2 LIQUID ORAL at 21:40

## 2024-11-04 RX ADMIN — NICOTINE 14 MG: 14 PATCH, EXTENDED RELEASE TRANSDERMAL at 08:19

## 2024-11-04 RX ADMIN — GLYCOPYRROLATE 0.4 MCG: 0.2 INJECTION, SOLUTION INTRAMUSCULAR; INTRAVENOUS at 13:40

## 2024-11-04 RX ADMIN — FOLIC ACID TAB 400 MCG 800 MCG: 400 TAB at 11:39

## 2024-11-04 RX ADMIN — HYDRALAZINE HYDROCHLORIDE 5 MG: 20 INJECTION, SOLUTION INTRAMUSCULAR; INTRAVENOUS at 01:53

## 2024-11-04 RX ADMIN — PROPOFOL 80 MCG/KG/MIN: 10 INJECTION, EMULSION INTRAVENOUS at 13:23

## 2024-11-04 RX ADMIN — PROPOFOL 50 MG: 10 INJECTION, EMULSION INTRAVENOUS at 13:38

## 2024-11-04 RX ADMIN — LABETALOL HYDROCHLORIDE 10 MG: 5 INJECTION, SOLUTION INTRAVENOUS at 00:39

## 2024-11-04 RX ADMIN — CARVEDILOL 12.5 MG: 12.5 TABLET, FILM COATED ORAL at 17:26

## 2024-11-04 RX ADMIN — ATORVASTATIN CALCIUM 80 MG: 80 TABLET, FILM COATED ORAL at 17:26

## 2024-11-04 RX ADMIN — HYDROCHLOROTHIAZIDE 12.5 MG: 12.5 TABLET ORAL at 08:19

## 2024-11-04 RX ADMIN — HEPARIN SODIUM 5000 UNITS: 5000 INJECTION INTRAVENOUS; SUBCUTANEOUS at 14:40

## 2024-11-04 RX ADMIN — SODIUM CHLORIDE: 0.9 INJECTION, SOLUTION INTRAVENOUS at 13:04

## 2024-11-04 NOTE — PHYSICAL THERAPY NOTE
"   Physical Therapy Treatment Note       11/04/24 0835   PT Last Visit   PT Visit Date 11/04/24   Note Type   Note Type Treatment   Pain Assessment   Pain Assessment Tool 0-10   Pain Score 3   Pain Location/Orientation Location: Generalized   Patient's Stated Pain Goal No pain   Hospital Pain Intervention(s) Ambulation/increased activity   Restrictions/Precautions   Weight Bearing Precautions Per Order No   Other Precautions Cognitive;Chair Alarm;Bed Alarm;Multiple lines;Telemetry;Fall Risk;Pain  (chair alarm re-armed post session)   General   Chart Reviewed Yes   Family/Caregiver Present No   Cognition   Overall Cognitive Status Impaired   Arousal/Participation Responsive   Attention Attends with cues to redirect   Orientation Level Oriented to person;Oriented to place;Oriented to situation   Memory Unable to assess   Following Commands Follows one step commands inconsistently   Subjective   Subjective some mild to moderate confusion.  willing to participate.  states \"I feel stronger\"   Transfers   Sit to Stand 4  Minimal assistance   Additional items Assist x 1;Increased time required;Impulsive;Verbal cues   Stand to Sit 4  Minimal assistance   Additional items Assist x 1;Increased time required;Impulsive;Verbal cues   Ambulation/Elevation   Gait pattern   (slow, short step length, ataxia, increased RW advancement, shuffling)   Gait Assistance 4  Minimal assist  (occasional mod A)   Additional items Assist x 1  (w/ 2nd for lines)   Assistive Device Rolling walker  (and at times no device)   Distance 70'x1, 30'x1 w/ standing rests w/ RW, followed by 40'x1 w/ no device.  cues for safety and obstacle avoidance w/ RW   Balance   Static Sitting Good   Dynamic Sitting Fair -   Static Standing Poor +   Dynamic Standing Poor   Ambulatory Poor   Endurance Deficit   Endurance Deficit Yes   Endurance Deficit Description fatigue, weakness, pain, cog status   Activity Tolerance   Activity Tolerance Patient limited by " fatigue;Patient limited by pain;Treatment limited secondary to medical complications (Comment)   Nurse Made Aware yes   Assessment   Prognosis Good   Problem List Decreased strength;Impaired balance;Decreased endurance;Decreased mobility;Decreased coordination;Pain;Impaired judgement;Decreased cognition;Decreased safety awareness   Assessment Pt seen for session for setup, transfers, gait w/ rest time, repositioning.  Pt cooperative w/ session.  some continued confusion but imoproving.  Needs cues for safety, hand placement and sequencing.  Some issues identified w/ safe RW mgmt, had several instances of striking objects in the [place], as well as increased RW advancement.  without device, note some continued ataxia and LOB.  Due to mobility decline, safety concerns, continue to anticipate the need for Level I (maximal) post acute care therapy needs at d/c   Goals   Patient Goals to get stonger   STG Expiration Date 11/17/24   PT Treatment Day 1   Plan   Treatment/Interventions Functional transfer training;LE strengthening/ROM;Elevations;Endurance training;Therapeutic exercise;Patient/family training;Equipment eval/education;Gait training;Bed mobility   Progress Progressing toward goals   PT Frequency 3-5x/wk   Discharge Recommendation   Rehab Resource Intensity Level, PT I (Maximum Resource Intensity)   Equipment Recommended Walker   Walker Package Recommended Wheeled walker   Change/add to Walker Package? No   AM-PAC Basic Mobility Inpatient   Turning in Flat Bed Without Bedrails 4   Lying on Back to Sitting on Edge of Flat Bed Without Bedrails 3   Moving Bed to Chair 2   Standing Up From Chair Using Arms 3   Walk in Room 2   Climb 3-5 Stairs With Railing 1   Basic Mobility Inpatient Raw Score 15   Basic Mobility Standardized Score 36.97   [facility] Highest Level Of Mobility   -[facility] Goal 4: Move to chair/commode   -HL Achieved 7: Walk 25 feet or more     Damian Ayala PT, DPT, GCS, CSRS

## 2024-11-04 NOTE — PLAN OF CARE
Problem: PHYSICAL THERAPY ADULT  Goal: Performs mobility at highest level of function for planned discharge setting.  See evaluation for individualized goals.  Description: Treatment/Interventions: ADL retraining, Functional transfer training, LE strengthening/ROM, Elevations, Therapeutic exercise, Cognitive reorientation, Bed mobility, Gait training, Spoke to nursing, OT  Equipment Recommended: Walker       See flowsheet documentation for full assessment, interventions and recommendations.  Outcome: Progressing  Note: Prognosis: Good  Problem List: Decreased strength, Impaired balance, Decreased endurance, Decreased mobility, Decreased coordination, Pain, Impaired judgement, Decreased cognition, Decreased safety awareness  Assessment: Pt seen for session for setup, transfers, gait w/ rest time, repositioning.  Pt cooperative w/ session.  some continued confusion but imoproving.  Needs cues for safety, hand placement and sequencing.  Some issues identified w/ safe RW mgmt, had several instances of striking objects in the [place], as well as increased RW advancement.  without device, note some continued ataxia and LOB.  Due to mobility decline, safety concerns, continue to anticipate the need for Level I (maximal) post acute care therapy needs at d/c  Barriers to Discharge: Decreased caregiver support     Rehab Resource Intensity Level, PT: I (Maximum Resource Intensity)    See flowsheet documentation for full assessment.

## 2024-11-04 NOTE — ASSESSMENT & PLAN NOTE
On admission patient reported drinking a case of beer over several days with a course of a week.  Earlier in stay he began going through withdrawals and has since admitted that he drinks about half a case of beer a day  Patient has been given benzodiazepine medications for his withdrawal symptoms with apparent paradoxical agitation  Patient was placed on ICU level of care on Precedex infusion and has since been weaned.  He is resting comfortably in bed and has no new complaints  Continue thiamine, folate.  Certified  consult placed

## 2024-11-04 NOTE — ASSESSMENT & PLAN NOTE
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  No plans for surgical management at this time  Blood pressure appear generally better controlled now that patient is out of withdrawals.  Will add amlodipine to recently started HCTZ  Target Blood Pressure 120/80

## 2024-11-04 NOTE — ANESTHESIA POSTPROCEDURE EVALUATION
Post-Op Assessment Note    Last Filed PACU Vitals:  Vitals Value Taken Time   Temp     Pulse 68 11/04/24 1603   /66 11/04/24 1601   Resp     SpO2 100 % 11/04/24 1603   Vitals shown include unfiled device data.    Modified Regine:  No data recorded

## 2024-11-04 NOTE — ANESTHESIA PREPROCEDURE EVALUATION
Procedure:  ELICEO    Relevant Problems   CARDIO   (+) Penetrating atherosclerotic ulcer of aorta (HCC)      NEURO/PSYCH   (+) CVA (cerebral vascular accident) (HCC)   (+) Right leg weakness      Behavioral Health   (+) Alcohol abuse   (+) Tobacco abuse        Physical Exam    Airway    Mallampati score: III  TM Distance: >3 FB  Neck ROM: full     Dental   No notable dental hx     Cardiovascular  Cardiovascular exam normal    Pulmonary  Pulmonary exam normal     Other Findings        Anesthesia Plan  ASA Score- 3     Anesthesia Type- IV sedation with anesthesia with ASA Monitors.         Additional Monitors:     Airway Plan:            Plan Factors-    Chart reviewed. EKG reviewed.  Existing labs reviewed. Patient summary reviewed.    Patient is a current smoker.              Induction- intravenous.    Postoperative Plan-     Perioperative Resuscitation Plan - Level 1 - Full Code.       Informed Consent- Anesthetic plan and risks discussed with patient, healthcare power of  and daughter.  I personally reviewed this patient with the CRNA. Discussed and agreed on the Anesthesia Plan with the CRNA..

## 2024-11-04 NOTE — CASE MANAGEMENT
Case Management Discharge Planning Note    Patient name Vlad Gregorio  Location [facility]01 MRN [id number]  : 1960 Date 2024       Current Admission Date: 10/29/2024  Current Admission Diagnosis:Penetrating atherosclerotic ulcer of aorta (HCC)   Patient Active Problem List    Diagnosis Date Noted Date Diagnosed    CVA (cerebral vascular accident) (HCC) 2024     Right leg weakness 10/29/2024     Alcohol abuse 10/29/2024     Penetrating atherosclerotic ulcer of aorta (HCC) 10/29/2024     Syncope and collapse 2018     Tobacco abuse 2018       LOS (days): 6  Geometric Mean LOS (GMLOS) (days): 3.1  Days to GMLOS:-2.6     OBJECTIVE:  Risk of Unplanned Readmission Score: 10.07       Current admission status: Inpatient   Preferred Pharmacy:   UNKNOWN - FOLLOW UP PRIOR TO DISCHARGE TO E-PRESCRIBE  No address on file    Primary Care Provider: Arthur Watts MD    Primary Insurance: [employer]  Secondary Insurance:     DISCHARGE DETAILS:  Discharge planning discussed with:: Pt bedside  Freedom of Choice: Yes  Comments - Freedom of Choice: Discussed     Treatment Team Recommendation: Acute Rehab      Additional Comments: CM met with pt bedside to review DCP, provided list of in-network acute rehab centers tailored to patient's insurance and location. Pt requested referral to [facility], agreeable to blanket referral if [place] does not have available bed. CM placed referral via [facility], awaiting response, CM to follow    ADDENDUM  [facility] able to accept and provided insurance auth information. CM reserved facility in [facility] for patient and tagged Discharge Support team to initiate insurance authorization

## 2024-11-04 NOTE — ASSESSMENT & PLAN NOTE
- Patient states he drinks a case of beers every weekend (roughly 30 beers per case)  - [facility] protocol  - Thiamine supplementation  - DT management per primary team; phenobarbital taper

## 2024-11-04 NOTE — ASSESSMENT & PLAN NOTE
"Patient presenting to the ED for lower extremity weakness, CT head demonstrates \"New hypodense areas involving the bilateral basal ganglia, which may be related to prior infarcts, or worsening chronic microangiopathic changes, but more recent infarcts in these regions cannot be excluded\"  MRI shows \"Acute infarcts involving the right corona radiata/gangliocapsular region, left gangliocapsular region and right parietal lobe. Mild mass effect without midline shift.\"  Case discussed with neurology and we appreciate their expertise, ELICEO recommended given CVAs in multiple circulations.  Discussed with cardiology and we appreciate their assistance in scheduling. Tentatively planned for today  "

## 2024-11-04 NOTE — PLAN OF CARE
Problem: OCCUPATIONAL THERAPY ADULT  Goal: Performs self-care activities at highest level of function for planned discharge setting.  See evaluation for individualized goals.  Description: Treatment Interventions: ADL retraining, Functional transfer training, UE strengthening/ROM, Endurance training, Cognitive reorientation, Patient/family training, Equipment evaluation/education, Fine motor coordination activities, Compensatory technique education, Energy conservation, Activityengagement, Neuromuscular reeducation          See flowsheet documentation for full assessment, interventions and recommendations.   Outcome: Progressing  Note: Limitation: Decreased ADL status, Decreased UE strength, Decreased Safe judgement during ADL, Decreased cognition, Decreased endurance, Decreased fine motor control, Decreased high-level ADLs  Prognosis: Fair  Assessment: Pt seen for OT treatment session day 1 on this date focused on ADL retraining, functional transfers and mobility, energy conservation, functional endurance. Pt was greeted in chair and was cooperative throughout session. Following session, pt was left in chair with chair alarm on and all needs within reach. Pt continues to be limited by functional status related to ADLs and transfers requiring MOD A for LB dressing, MIN A x 1 with RW for functional transfers and mobility, continues to be limited by cognition. The patient's raw score on the -PAC Daily Activity Inpatient Short Form is 16. A raw score of less than 19 suggests the patient may benefit from discharge to post-acute rehabilitation services. Please refer to the recommendation of the Occupational Therapist for safe discharge planning. Pt would benefit from continued acute OT intervention with plan to continue OT treatment sessions 2-3x per week. Recommend d/c to level I services.     Rehab Resource Intensity Level, OT: I (Maximum Resource Intensity)

## 2024-11-04 NOTE — ANESTHESIA POSTPROCEDURE EVALUATION
Post-Op Assessment Note    CV Status:  Stable  Pain Score: 0    Pain management: adequate       Mental Status:  Alert and awake   Hydration Status:  Euvolemic   PONV Controlled:  Controlled   Airway Patency:  Patent     Post Op Vitals Reviewed: Yes    Anethesia notable event occurred.    Staff: CRNA         Possible laryngospasm, O2 saturation to 80%, improved after suction and Jimenez's maneuver.     Last Filed PACU Vitals:  Vitals Value Taken Time   Temp     Pulse 78 11/04/24 1400   /62 11/04/24 1400   Resp 20    SpO2 100 % 11/04/24 1400       Modified Regine:  No data recorded

## 2024-11-04 NOTE — OCCUPATIONAL THERAPY NOTE
Occupational Therapy Progress Note     Patient Name: Vlad Gregorio  Today's Date: 11/4/2024  Problem List  Principal Problem:    Penetrating atherosclerotic ulcer of aorta ([facility])  Active Problems:    Tobacco abuse    Right leg weakness    Alcohol abuse    CVA (cerebral vascular accident) (HCC)          11/04/24 1137   OT Last Visit   OT Visit Date 11/04/24   Note Type   Note Type Treatment   Pain Assessment   Pain Assessment Tool 0-10   Pain Score 3   Pain Location/Orientation Orientation: Right;Location: Arm   Patient's Stated Pain Goal No pain   Hospital Pain Intervention(s) Repositioned;Ambulation/increased activity   Restrictions/Precautions   Other Precautions Cognitive;Chair Alarm;Bed Alarm;Telemetry;Fall Risk;Pain   Lifestyle   Autonomy Pt reports being IND w/ all ADLS and IADLS; (+) drives; ambulates w/o AD PTA   Reciprocal Relationships Pt reports residing alone and has local children that can A PNR.   Service to Others Pt works FT as a    Intrinsic Gratification None stated   ADL   Where Assessed Chair   LB Dressing Assistance 3  Moderate Assistance   LB Dressing Deficit Thread RLE into pants;Thread LLE into pants;Pull up over hips;Setup;Steadying   LB Dressing Comments Pt requiring light assist to thread BLE through pants although pt does majority, requiring assist to steady+pull pants over hips.   Toileting Comments declines need   Bed Mobility   Additional Comments Pt greeted OOB in chair, left in chair with alarm on and all needs within reach.   Transfers   Sit to Stand 4  Minimal assistance  (CGA)   Additional items Assist x 1;Increased time required;Verbal cues   Stand to Sit 4  Minimal assistance   Additional items Assist x 1;Increased time required;Verbal cues;Impulsive   Additional Comments with RW, STS x 3   Functional Mobility   Functional Mobility 4  Minimal assistance   Additional Comments Pt performs short household distance mobility with MIN A x 1 with RW, requiring one  "seated rest break and multiple vc for safe use of RW.   Additional items Rolling walker   Cognition   Overall Cognitive Status Impaired   Arousal/Participation Cooperative;Responsive   Attention Attends with cues to redirect   Orientation Level Oriented to person;Oriented to place;Oriented to situation  (at first reporting the date is \"the 23rd\" requiring vc for correction)   Memory Decreased short term memory;Decreased recall of recent events;Decreased recall of precautions  (recalls 1/3 words immediately, 1/3 with multiple choice options after 5 minutes, unable to recall 1/3 words)   Following Commands Follows one step commands with increased time or repetition   Comments Pt cooperative although very flat throughout session with decreased insight and safety awareness requiring frequent vc for safety during FM and ADL.   Activity Tolerance   Activity Tolerance Patient limited by fatigue;Patient limited by pain   Medical Staff Made Aware RN cleared for therapy, assist from restorative Elvira   Assessment   Assessment Pt seen for OT treatment session day 1 on this date focused on ADL retraining, functional transfers and mobility, energy conservation, functional endurance. Pt was greeted in chair and was cooperative throughout session. Following session, pt was left in chair with chair alarm on and all needs within reach. Pt continues to be limited by functional status related to ADLs and transfers requiring MOD A for LB dressing, MIN A x 1 with RW for functional transfers and mobility, continues to be limited by cognition. The patient's raw score on the AM-PAC Daily Activity Inpatient Short Form is 16. A raw score of less than 19 suggests the patient may benefit from discharge to post-acute rehabilitation services. Please refer to the recommendation of the Occupational Therapist for safe discharge planning. Pt would benefit from continued acute OT intervention with plan to continue OT treatment sessions 2-3x per week. " Recommend d/c to level I services.   Plan   Treatment Interventions ADL retraining;Functional transfer training;Endurance training;Continued evaluation;Energy conservation   Goal Expiration Date 11/17/24   OT Treatment Day 1   OT Frequency 2-3x/wk   Discharge Recommendation   Rehab Resource Intensity Level, OT I (Maximum Resource Intensity)   AM-PAC Daily Activity Inpatient   Lower Body Dressing 2   Bathing 2   Toileting 2   Upper Body Dressing 3   Grooming 3   Eating 4   Daily Activity Raw Score 16   Daily Activity Standardized Score (Calc for Raw Score >=11) 35.96   AM-PAC Applied Cognition Inpatient   Following a Speech/Presentation 3   Understanding Ordinary Conversation 4   Taking Medications 3   Remembering Where Things Are Placed or Put Away 2   Remembering List of 4-5 Errands 2   Taking Care of Complicated Tasks 2   Applied Cognition Raw Score 16   Applied Cognition Standardized Score 35.03   Modified Yuba Scale   Modified Yuba Scale 4   End of Consult   Education Provided Yes   Patient Position at End of Consult Bedside chair;Bed/Chair alarm activated;All needs within reach   Nurse Communication Nurse aware of consult       ROSALVA Alvarez, OTR/L

## 2024-11-04 NOTE — CASE MANAGEMENT
[facility] received request for authorization from Care Manager.  Authorization request submitted for: Acute Rehab  Facility Name: [facility] NPI: [id number]  Facility MD: Dr. Marcelino Bello  NPI: [id number]  Authorization initiated by contacting insurance:  [employer]hav[employer]  Via: [employer]   Clinicals submitted via [employer] attachment   Pending Reference #: [id number]    Care Manager notified: Tahmina Mcintosh    Updates to authorization status will be noted in chart. Please reach out to CM for updates on any clinical information.

## 2024-11-04 NOTE — RESTORATIVE TECHNICIAN NOTE
Restorative Technician Note      Patient Name: Vlad Gregorio     Restorative Tech Visit Date: 11/04/24  Note Type: Mobility  Patient Position Upon Consult: Supine  Activity Performed: Transferred  Assistive Device: Other (Comment) (HHA x 1)  Patient Position at End of Consult: Bed/Chair alarm activated; All needs within reach; Bedside chair

## 2024-11-04 NOTE — ASSESSMENT & PLAN NOTE
- Imaging showed contour abnormality in the undersurface of the posterior aortic arch consistent with penetrating ulcer with focal contained rupture.  No active extravasation, aneurysm, or dissection.  - Patient was transferred to [facility] for cardiothoracic surgery evaluation and possible intervention  - CT surgery consulted and low suspicion for penetrating atherosclerotic ulcer

## 2024-11-04 NOTE — PROGRESS NOTES
"Progress Note - Hospitalist   Name: Vlad Gregorio 64 y.o. male I MRN: [id number]  Unit/Bed#: [facility]317-01 I Date of Admission: 10/29/2024   Date of Service: 11/4/2024 I Hospital Day: 6    Assessment & Plan  Penetrating atherosclerotic ulcer of aorta (HCC)  CT concerning for penetrating aortic arch ulcer without evidence of extravasation or dissection  Patient has been evaluated by cardiothoracic surgery and we appreciate their expertise.  No plans for surgical management at this time  Blood pressure appear generally better controlled now that patient is out of withdrawals.  Will add amlodipine to recently started HCTZ  Target Blood Pressure 120/80  Tobacco abuse  Ordered Nicotine patch  Right leg weakness  Patient presenting to the ED for lower extremity weakness, CT head demonstrates \"New hypodense areas involving the bilateral basal ganglia, which may be related to prior infarcts, or worsening chronic microangiopathic changes, but more recent infarcts in these regions cannot be excluded\"  MRI shows \"Acute infarcts involving the right corona radiata/gangliocapsular region, left gangliocapsular region and right parietal lobe. Mild mass effect without midline shift.\"  Case discussed with neurology and we appreciate their expertise, ELICEO recommended given CVAs in multiple circulations.  Discussed with cardiology and we appreciate their assistance in scheduling. Tentatively planned for today  Alcohol abuse  On admission patient reported drinking a case of beer over several days with a course of a week.  Earlier in stay he began going through withdrawals and has since admitted that he drinks about half a case of beer a day  Patient has been given benzodiazepine medications for his withdrawal symptoms with apparent paradoxical agitation  Patient was placed on ICU level of care on Precedex infusion and has since been weaned.  He is resting comfortably in bed and has no new complaints  Continue thiamine, folate.  Certified " " consult placed  CVA (cerebral vascular accident) (HCC)  Patient presented to the ED for lower extremity weakness, CT of the head showing \"acute infarcts involving the right corona radiata/gangliocapsular region, left gangliocapsular region and right parietal lobe\"  Transthoracic echo without significant abnormalities, neurology recommending ELICEO to rule out thrombus, cardiac mass, or other possible source of multifocal infarct   Continue to monitor on telemetry  Resume aspirin, Plavix, and atorvastatin    VTE Pharmacologic Prophylaxis:   Moderate Risk (Score 3-4) - Pharmacological DVT Prophylaxis Ordered: heparin.    Mobility:   Basic Mobility Inpatient Raw Score: 16  JH-HLM Goal: 5: Stand one or more mins  JH-HLM Achieved: 5: Stand (1 or more minutes)  JH-HLM Goal achieved. Continue to encourage appropriate mobility.    Patient Centered Rounds: I performed bedside rounds with nursing staff today.   Discussions with Specialists or Other Care Team Provider: N/A    Education and Discussions with Family / Patient:  Will reach out to daughter today for update.     Current Length of Stay: 6 day(s)  Current Patient Status: Inpatient   Certification Statement: The patient will continue to require additional inpatient hospital stay due to CVA  Discharge Plan: Anticipate discharge in 24-48 hrs to discharge location to be determined pending rehab evaluations.    Code Status: Level 1 - Full Code    Subjective   Seen and examined at bedside. Patient resting comfortably. No new concerns at present. NPO presently for ELICEO    Objective :  Temp:  [98.6 °F (37 °C)-100.3 °F (37.9 °C)] 98.6 °F (37 °C)  HR:  [66-89] 89  BP: (107-159)/(45-79) 120/67  Resp:  [16-18] 16  SpO2:  [96 %-100 %] 100 %  O2 Device: None (Room air)    Body mass index is 31.31 kg/m².     Input and Output Summary (last 24 hours):     Intake/Output Summary (Last 24 hours) at 11/4/2024 1047  Last data filed at 11/4/2024 0754  Gross per 24 hour "   Intake 1436 ml   Output --   Net 1436 ml       Physical Exam  Vitals and nursing note reviewed.   Constitutional:       General: He is not in acute distress.     Appearance: He is well-developed.   HENT:      Head: Normocephalic and atraumatic.   Eyes:      Conjunctiva/sclera: Conjunctivae normal.   Cardiovascular:      Rate and Rhythm: Normal rate and regular rhythm.      Heart sounds: No murmur heard.  Pulmonary:      Effort: Pulmonary effort is normal. No respiratory distress.      Breath sounds: Normal breath sounds.   Abdominal:      Palpations: Abdomen is soft.      Tenderness: There is no abdominal tenderness.   Musculoskeletal:         General: No swelling.      Cervical back: Neck supple.   Skin:     General: Skin is warm and dry.      Capillary Refill: Capillary refill takes less than 2 seconds.   Neurological:      Mental Status: He is alert and oriented to person, place, and time.   Psychiatric:         Mood and Affect: Mood normal.       Lines/Drains:      Telemetry:  Telemetry Orders (From admission, onward)               24 Hour Telemetry Monitoring  Continuous x 24 Hours (Telem)        Question:  Reason for 24 Hour Telemetry  Answer:  TIA/Suspected CVA/ Confirmed CVA                     Telemetry Reviewed: Normal Sinus Rhythm  Indication for Continued Telemetry Use: Acute CVA               Lab Results: I have reviewed the following results:   Results from last 7 days   Lab Units 11/04/24 0449 11/01/24  0444   WBC Thousand/uL 4.89 5.96   HEMOGLOBIN g/dL 13.3 13.4   HEMATOCRIT % 37.9 39.9   PLATELETS Thousands/uL 172 182   SEGS PCT %  --  81*   LYMPHO PCT %  --  9*   MONO PCT %  --  9   EOS PCT %  --  0     Results from last 7 days   Lab Units 11/04/24  0449 11/01/24  0511 10/31/24  0604   SODIUM mmol/L 132*   < > 135   POTASSIUM mmol/L 3.8   < > 4.0   CHLORIDE mmol/L 98   < > 103   CO2 mmol/L 25   < > 25   BUN mg/dL 15   < > 14   CREATININE mg/dL 0.87   < > 0.94   ANION GAP mmol/L 9   < > 7    CALCIUM mg/dL 8.8   < > 8.4   ALBUMIN g/dL  --   --  3.8   TOTAL BILIRUBIN mg/dL  --   --  1.05*   ALK PHOS U/L  --   --  50   ALT U/L  --   --  14   AST U/L  --   --  19   GLUCOSE RANDOM mg/dL 88   < > 96    < > = values in this interval not displayed.     Results from last 7 days   Lab Units 10/30/24  0447   INR  1.15     Results from last 7 days   Lab Units 10/30/24  0726 10/30/24  0446 10/29/24  1432   POC GLUCOSE mg/dl 106 116 132     Results from last 7 days   Lab Units 10/30/24  0447   HEMOGLOBIN A1C % 5.5     Results from last 7 days   Lab Units 11/01/24  0642   LACTIC ACID mmol/L 0.6       Recent Cultures (last 7 days):               Last 24 Hours Medication List:     Current Facility-Administered Medications:     amLODIPine (NORVASC) tablet 5 mg, Daily    aspirin (ECOTRIN LOW STRENGTH) EC tablet 81 mg, Daily    atorvastatin (LIPITOR) tablet 80 mg, Daily With Dinner    carvedilol (COREG) tablet 12.5 mg, BID With Meals    chlorhexidine (PERIDEX) 0.12 % oral rinse 15 mL, Q12H SHEA    clopidogrel (PLAVIX) tablet 75 mg, Daily    folic acid 1 mg, thiamine (VITAMIN B1) 100 mg in sodium chloride 0.9 % 100 mL IV piggyback, Daily, Last Rate: 0 mL/hr at 11/01/24 1005    heparin (porcine) subcutaneous injection 5,000 Units, Q8H SHEA    hydrALAZINE (APRESOLINE) injection 5 mg, Q4H PRN    hydroCHLOROthiazide tablet 12.5 mg, Daily    labetalol (NORMODYNE) injection 10 mg, Q4H PRN    nicotine (NICODERM CQ) 14 mg/24hr TD 24 hr patch 14 mg, Daily    [COMPLETED] PHENobarbital injection 90 mg, TID **FOLLOWED BY** PHENobarbital injection 60 mg, TID **FOLLOWED BY** [START ON 11/5/2024] PHENobarbital injection 30 mg, TID **FOLLOWED BY** [START ON 11/7/2024] PHENobarbital injection 15 mg, TID    Administrative Statements   Today, Patient Was Seen By: Oliver Sewell      **Please Note: This note may have been constructed using a voice recognition system.**

## 2024-11-04 NOTE — PHYSICAL THERAPY NOTE
"   Physical Therapy Evaluation    Patient's Name: Vlad Gregorio    Admitting Diagnosis  Weakness of right lower extremity [R29.898]    Problem List  Patient Active Problem List   Diagnosis    Syncope and collapse    Tobacco abuse    Right leg weakness    Alcohol abuse    Penetrating atherosclerotic ulcer of aorta (HCC)    CVA (cerebral vascular accident) (HCC)       Past Medical History  Past Medical History:   Diagnosis Date    Alcohol use     no known liver dysfunction    Carotid stenosis, asymptomatic, right     40-50% JOSE at bifurcation/bulb    Chronic bronchitis (HCC)     Current tobacco use     History of CVA (cerebrovascular accident)     thalmic, right occipital, b/l basal ganglia    History of rectal cancer     s/p resection    Obesity     Pulmonary nodules     right apical/RUL       Past Surgical History  Past Surgical History:   Procedure Laterality Date    RECTAL BIOPSY      RECTAL SURGERY      lower anterior resection/LAR          11/04/24 0835   PT Last Visit   PT Visit Date 11/04/24   Note Type   Note Type Treatment   Pain Assessment   Pain Assessment Tool 0-10   Pain Score 3   Pain Location/Orientation Location: Generalized   Patient's Stated Pain Goal No pain   Hospital Pain Intervention(s) Ambulation/increased activity   Restrictions/Precautions   Weight Bearing Precautions Per Order No   Other Precautions Cognitive;Chair Alarm;Bed Alarm;Multiple lines;Telemetry;Fall Risk;Pain  (chair alarm re-armed post session)   General   Chart Reviewed Yes   Family/Caregiver Present No   Cognition   Overall Cognitive Status Impaired   Arousal/Participation Responsive   Attention Attends with cues to redirect   Orientation Level Oriented to person;Oriented to place;Oriented to situation   Memory Unable to assess   Following Commands Follows one step commands inconsistently   Subjective   Subjective some mild to moderate confusion.  willing to participate.  states \"I feel stronger\"   Transfers   Sit to Stand 4  " Minimal assistance   Additional items Assist x 1;Increased time required;Impulsive;Verbal cues   Stand to Sit 4  Minimal assistance   Additional items Assist x 1;Increased time required;Impulsive;Verbal cues   Ambulation/Elevation   Gait pattern   (slow, short step length, ataxia, increased RW advancement, shuffling)   Gait Assistance 4  Minimal assist  (occasional mod A)   Additional items Assist x 1  (w/ 2nd for lines)   Assistive Device Rolling walker  (and at times no device)   Distance 70'x1, 30'x1 w/ standing rests w/ RW, followed by 40'x1 w/ no device.  cues for safety and obstacle avoidance w/ RW   Balance   Static Sitting Good   Dynamic Sitting Fair -   Static Standing Poor +   Dynamic Standing Poor   Ambulatory Poor   Endurance Deficit   Endurance Deficit Yes   Endurance Deficit Description fatigue, weakness, pain, cog status   Activity Tolerance   Activity Tolerance Patient limited by fatigue;Patient limited by pain;Treatment limited secondary to medical complications (Comment)   Nurse Made Aware yes   Assessment   Prognosis Good   Problem List Decreased strength;Impaired balance;Decreased endurance;Decreased mobility;Decreased coordination;Pain;Impaired judgement;Decreased cognition;Decreased safety awareness   Assessment Pt seen for session for setup, transfers, gait w/ rest time, repositioning.  Pt cooperative w/ session.  some continued confusion but imoproving.  Needs cues for safety, hand placement and sequencing.  Some issues identified w/ safe RW mgmt, had several instances of striking objects in the [place], as well as increased RW advancement.  without device, note some continued ataxia and LOB.  Due to mobility decline, safety concerns, continue to anticipate the need for Level I (maximal) post acute care therapy needs at d/c   Goals   Patient Goals to get stonger   STG Expiration Date 11/17/24   PT Treatment Day 1   Plan   Treatment/Interventions Functional transfer training;JULIO  strengthening/ROM;Elevations;Endurance training;Therapeutic exercise;Patient/family training;Equipment eval/education;Gait training;Bed mobility   Progress Progressing toward goals   PT Frequency 3-5x/wk   Discharge Recommendation   Rehab Resource Intensity Level, PT I (Maximum Resource Intensity)   Equipment Recommended Walker   Walker Package Recommended Wheeled walker   Change/add to Walker Package? No   AM-PAC Basic Mobility Inpatient   Turning in Flat Bed Without Bedrails 4   Lying on Back to Sitting on Edge of Flat Bed Without Bedrails 3   Moving Bed to Chair 2   Standing Up From Chair Using Arms 3   Walk in Room 2   Climb 3-5 Stairs With Railing 1   Basic Mobility Inpatient Raw Score 15   Basic Mobility Standardized Score 36.97   [facility] Highest Level Of Mobility   -[facility] Goal 4: Move to chair/commode   -HLM Achieved 7: Walk 25 feet or more           Damian Ayala PT, DPT, CSRS

## 2024-11-04 NOTE — ASSESSMENT & PLAN NOTE
64 y.o. male with prior strokes/TIAs, history of colon cancer, tobacco use, and alcohol abuse who presented to [facility] on 10/29/2024 as a stroke alert for RLE weakness and R foot tingling.    Upon arrival to the ED, /83.  NIHSS 1 (orientation questions).  CT head:   New hypodense areas involving the bilateral basal ganglia, which may represent prior infarcts or worsening chronic microangiopathic changes, but cannot fully rule out recent infarcts.  Encephalomalacia involving the right occipital lobe related to prior infarct.  Old thalamic lacunar infarct.  CTA head/neck:  No LVO or significant stenosis  Irregularity/outpouching along the undersurface of the aortic arch concerning for penetrating atherosclerotic ulcer with associated rupture given adjacent mediastinal soft tissue density/hematoma  40 to 50% stenosis of the right cervical ICA due to atherosclerotic plaque at the right carotid bulb/bifurcation  Multiple pulmonary nodules involving the visualized right lung  CTA dissection protocol:  Again noted possible penetrating ulcer with focal contained rupture, but no active extravasation.  No aneurysm or dissection.  Patient was not a TNK candidate due to symptoms resolving.  Patient was loaded with aspirin 325 mg and Plavix 300 mg.    [facility].  He was seen by cardiac ICU team and placed on Cardene gtt, which has now been weaned off.  CT Surgery evaluated and determined that the patient does not have an acute aortic process.  Hospital course complicated by DTs, so currently on phenobarb with taper.    MRI brain 11/1: Acute infarction involving the right corona radiata/gangliocapsular region, left gangliocapsular region, and right parietal lobe with mild mass effect without midline shift.  Also noted moderate/marked chronic microangiopathy  Echo: EF 65%, left atrium is mildly dilated  LDL 61, Cholesterol 123, A1c 5.5    Etiology for embolic  strokes remains unclear, but likely proximal source.  Cannot entirely blame findings on atherosclerotic ulcer that was noted to be distal to the origin of the left subclavian artery.    Plan:  - Stroke pathway  ELICEO pending; if unremarkable, recommend outpatient Zio patch/loop recorder   Thrombosis panel in process  S/p aspirin 325 mg and Plavix 300 mg once  Continue aspirin 81 mg and Plavix 75 mg daily.  Continue ASA/Plavix for 21 days and then transition to aspirin monotherapy  Patient states he was taking aspirin 81 mg only Monday-Friday, but not on the weekends  Continue Atorvastatin 80 mg daily (statin naive PTA)  Normotensive goal.  Avoid hypotension  Euglycemic, normothermic goal  Continue telemetry  PT/OT/ST  Stroke education  Frequent neuro checks. Continue to monitor and notify neurology with any changes.  STAT CT head for any acute change in neuro exam  - Medical management and supportive care per primary team. Correction of any metabolic or infectious disturbances.    Quality 130: Documentation Of Current Medications In The Medical Record: Current Medications Documented Quality 402: Tobacco Use And Help With Quitting Among Adolescents: Patient screened for tobacco and never smoked Detail Level: Zone Quality 431: Preventive Care And Screening: Unhealthy Alcohol Use - Screening: Patient screened for unhealthy alcohol use using a single question and scores less than 2 times per year

## 2024-11-04 NOTE — PROGRESS NOTES
Progress Note - Neurology   Name: Vlad Gregorio 64 y.o. male I MRN: [id number]  Unit/Bed#: [facility]PHP-317-01 I Date of Admission: 10/29/2024   Date of Service: 11/4/2024 I Hospital Day: 6    Assessment & Plan  CVA (cerebral vascular accident) (HCC)  64 y.o. male with prior strokes/TIAs, history of colon cancer, tobacco use, and alcohol abuse who presented to [facility] on 10/29/2024 as a stroke alert for RLE weakness and R foot tingling.    Upon arrival to the ED, /83.  NIHSS 1 (orientation questions).  CT head:   New hypodense areas involving the bilateral basal ganglia, which may represent prior infarcts or worsening chronic microangiopathic changes, but cannot fully rule out recent infarcts.  Encephalomalacia involving the right occipital lobe related to prior infarct.  Old thalamic lacunar infarct.  CTA head/neck:  No LVO or significant stenosis  Irregularity/outpouching along the undersurface of the aortic arch concerning for penetrating atherosclerotic ulcer with associated rupture given adjacent mediastinal soft tissue density/hematoma  40 to 50% stenosis of the right cervical ICA due to atherosclerotic plaque at the right carotid bulb/bifurcation  Multiple pulmonary nodules involving the visualized right lung  CTA dissection protocol:  Again noted possible penetrating ulcer with focal contained rupture, but no active extravasation.  No aneurysm or dissection.  Patient was not a TNK candidate due to symptoms resolving.  Patient was loaded with aspirin 325 mg and Plavix 300 mg.    [facility] was [facility] evaluation.  He was seen by cardiac ICU team and placed on Cardene gtt, which has now been weaned off.  CT Surgery evaluated and determined that the patient does not have an acute aortic process.  Hospital course complicated by DTs, so currently on phenobarb with taper.    MRI brain 11/1: Acute infarction involving the right corona radiata/gangliocapsular region, left  gangliocapsular region, and right parietal lobe with mild mass effect without midline shift.  Also noted moderate/marked chronic microangiopathy  Echo: EF 65%, left atrium is mildly dilated  LDL 61, Cholesterol 123, A1c 5.5    Etiology for embolic strokes remains unclear, but likely proximal source.  Cannot entirely blame findings on atherosclerotic ulcer that was noted to be distal to the origin of the left subclavian artery.    Plan:  - Stroke pathway  ELICEO pending; if unremarkable, recommend outpatient Zio patch/loop recorder   Thrombosis panel in process  S/p aspirin 325 mg and Plavix 300 mg once  Continue aspirin 81 mg and Plavix 75 mg daily.  Continue ASA/Plavix for 21 days and then transition to aspirin monotherapy  Patient states he was taking aspirin 81 mg only Monday-Friday, but not on the weekends  Continue Atorvastatin 80 mg daily (statin naive PTA)  Normotensive goal.  Avoid hypotension  Euglycemic, normothermic goal  Continue telemetry  PT/OT/ST  Stroke education  Frequent neuro checks. Continue to monitor and notify neurology with any changes.  STAT CT head for any acute change in neuro exam  - Medical management and supportive care per primary team. Correction of any metabolic or infectious disturbances.   Tobacco abuse  - Counseled on smoking cessation  Alcohol abuse  - Patient states he drinks a case of beers every weekend (roughly 30 beers per case)  - [facility] protocol  - Thiamine supplementation  - DT management per primary team; phenobarbital taper  Penetrating atherosclerotic ulcer of aorta (HCC)  - Imaging showed contour abnormality in the undersurface of the posterior aortic arch consistent with penetrating ulcer with focal contained rupture.  No active extravasation, aneurysm, or dissection.  - Patient was transferred to \[facility]"" for cardiothoracic surgery evaluation and possible intervention  - CT surgery consulted and low suspicion for penetrating atherosclerotic ulcer    Vlad Gregorio will need  follow-up in  6 weeks  with neurovascular team for Other in 60 minute appointment. They will not require outpatient neurological testing.    Subjective   Patient lying in bedside chair in no acute distress.  Denies any complaints other than some RUE pain.    Review of Systems   Musculoskeletal:  Positive for myalgias.   All other systems reviewed and are negative.        Objective :  Temp:  [98.6 °F (37 °C)-100.3 °F (37.9 °C)] 98.6 °F (37 °C)  HR:  [66-89] 89  BP: (107-159)/(45-79) 110/61  Resp:  [16-18] 16  SpO2:  [96 %-100 %] 100 %  O2 Device: None (Room air)    Physical Exam  Vitals and nursing note reviewed.   Constitutional:       Appearance: Normal appearance.      Comments: Patient sleeping in bedside chair in no acute distress.  Quickly arouses to verbal stimuli   HENT:      Head: Normocephalic and atraumatic.      Mouth/Throat:      Mouth: Mucous membranes are moist.      Pharynx: Oropharynx is clear.   Eyes:      Extraocular Movements: Extraocular movements intact.      Conjunctiva/sclera: Conjunctivae normal.   Pulmonary:      Effort: Pulmonary effort is normal.   Musculoskeletal:         General: Normal range of motion.   Skin:     General: Skin is warm and dry.   Neurological:      Mental Status: He is alert.      Deep Tendon Reflexes:      Reflex Scores:       Bicep reflexes are 2+ on the right side and 2+ on the left side.       Brachioradialis reflexes are 2+ on the right side and 2+ on the left side.       Patellar reflexes are 2+ on the right side and 2+ on the left side.  Neurological Exam  Mental Status  Alert.  Patient initially sleeping, but quickly arouses.  Awake and alert.  Oriented to person, place, month, and year.  No dysarthria or aphasia.  Able to follow simple midline and appendicular commands..    Cranial Nerves  CN III, IV, VI: Extraocular movements intact bilaterally.  EOMs intact without nystagmus.  No visual field deficits.  Facial sensation to light touch intact  throughout.  Facial expressions full and symmetric.  Tongue midline.  Hearing grossly intact..    Motor  Normal muscle bulk throughout. Normal muscle tone.  5/5 strength in bilateral upper and lower extremities..    Sensory  Sensation to light touch intact throughout..    Reflexes                                            Right                      Left  Brachioradialis                    2+                         2+  Biceps                                 2+                         2+  Patellar                                2+                         2+  No resting or action tremor.    Coordination    No ataxia with finger to nose bilaterally.    Gait    Deferred for patient's safety.        Lab Results: I have reviewed the following results:  Imaging Results Review: I personally reviewed the following image studies in PACS and associated radiology reports: CT head, CTA head/neck, MRI brain. My interpretation of the radiology images/reports is: as above.  Also reviewed Echo report    VTE Pharmacologic Prophylaxis: VTE covered by:  heparin (porcine), Subcutaneous, 5,000 Units at 11/03/24 8609    and Sequential compression device (Venodyne)

## 2024-11-05 ENCOUNTER — TELEPHONE (OUTPATIENT)
Age: 64
End: 2024-11-05

## 2024-11-05 LAB
APTT SCREEN TO CONFIRM RATIO: 0.93 RATIO (ref 0–1.34)
CONFIRM APTT/NORMAL: 37.6 SEC (ref 0–47.6)
LA PPP-IMP: NORMAL
PROT S ACT/NOR PPP: 95 % (ref 61–136)
PROT S PPP-ACNC: 65 % (ref 60–150)
SCREEN APTT: 31.7 SEC (ref 0–43.5)
SCREEN DRVVT: 33.6 SEC (ref 0–47)
THROMBIN TIME: 16.9 SEC (ref 0–23)

## 2024-11-05 PROCEDURE — 99232 SBSQ HOSP IP/OBS MODERATE 35: CPT | Performed by: INTERNAL MEDICINE

## 2024-11-05 PROCEDURE — 97116 GAIT TRAINING THERAPY: CPT

## 2024-11-05 PROCEDURE — 97530 THERAPEUTIC ACTIVITIES: CPT

## 2024-11-05 RX ADMIN — CARVEDILOL 12.5 MG: 12.5 TABLET, FILM COATED ORAL at 17:02

## 2024-11-05 RX ADMIN — HYDROCHLOROTHIAZIDE 12.5 MG: 12.5 TABLET ORAL at 08:48

## 2024-11-05 RX ADMIN — ATORVASTATIN CALCIUM 80 MG: 80 TABLET, FILM COATED ORAL at 17:02

## 2024-11-05 RX ADMIN — CLOPIDOGREL BISULFATE 75 MG: 75 TABLET, FILM COATED ORAL at 08:48

## 2024-11-05 RX ADMIN — AMLODIPINE BESYLATE 5 MG: 5 TABLET ORAL at 08:48

## 2024-11-05 RX ADMIN — PHENOBARBITAL SODIUM 30 MG: 65 INJECTION INTRAMUSCULAR at 22:40

## 2024-11-05 RX ADMIN — CARVEDILOL 12.5 MG: 12.5 TABLET, FILM COATED ORAL at 08:48

## 2024-11-05 RX ADMIN — CHLORHEXIDINE GLUCONATE 0.12% ORAL RINSE 15 ML: 1.2 LIQUID ORAL at 08:47

## 2024-11-05 RX ADMIN — NICOTINE 14 MG: 14 PATCH, EXTENDED RELEASE TRANSDERMAL at 08:49

## 2024-11-05 RX ADMIN — THIAMINE HCL TAB 100 MG 100 MG: 100 TAB at 08:49

## 2024-11-05 RX ADMIN — HEPARIN SODIUM 5000 UNITS: 5000 INJECTION INTRAVENOUS; SUBCUTANEOUS at 22:40

## 2024-11-05 RX ADMIN — PHENOBARBITAL SODIUM 30 MG: 65 INJECTION INTRAMUSCULAR at 08:47

## 2024-11-05 RX ADMIN — FOLIC ACID TAB 400 MCG 800 MCG: 400 TAB at 08:48

## 2024-11-05 RX ADMIN — HEPARIN SODIUM 5000 UNITS: 5000 INJECTION INTRAVENOUS; SUBCUTANEOUS at 06:10

## 2024-11-05 RX ADMIN — HEPARIN SODIUM 5000 UNITS: 5000 INJECTION INTRAVENOUS; SUBCUTANEOUS at 17:02

## 2024-11-05 RX ADMIN — ASPIRIN 81 MG: 81 TABLET, COATED ORAL at 08:48

## 2024-11-05 RX ADMIN — PHENOBARBITAL SODIUM 30 MG: 65 INJECTION INTRAMUSCULAR at 17:02

## 2024-11-05 NOTE — ASSESSMENT & PLAN NOTE
On admission patient reported drinking a case of beer over several days with a course of a week.  Earlier in stay he began going through withdrawals and has since admitted that he drinks about half a case of beer a day  Continue thiamine and folate

## 2024-11-05 NOTE — PHYSICAL THERAPY NOTE
PHYSICAL THERAPY NOTE          Patient Name: Vlad Gregorio  Today's Date: 11/5/2024 11/05/24 1346   PT Last Visit   PT Visit Date 11/05/24   Note Type   Note Type Treatment   Pain Assessment   Pain Assessment Tool 0-10   Pain Score No Pain   Restrictions/Precautions   Other Precautions Cardiac/sternal;Cognitive;Bed Alarm;Telemetry;Fall Risk   General   Chart Reviewed Yes   Additional Pertinent History cleared for Tx session by nsg   Response to Previous Treatment Patient with no complaints from previous session.   Cognition   Overall Cognitive Status Impaired   Arousal/Participation Alert;Cooperative   Attention Attends with cues to redirect   Orientation Level Oriented to person;Oriented to place;Oriented to situation   Memory Decreased recall of recent events;Decreased recall of precautions   Following Commands Follows one step commands without difficulty   Subjective   Subjective Alert; in bed; flat affect; responds to questions appropriately; agreeable to mobilize   Bed Mobility   Supine to Sit 5  Supervision   Additional items Increased time required;Verbal cues   Sit to Supine 5  Supervision   Additional items Increased time required;Verbal cues   Transfers   Sit to Stand 4  Minimal assistance   Additional items Assist x 1;Increased time required;Verbal cues   Stand to Sit 4  Minimal assistance   Additional items Assist x 1;Increased time required;Verbal cues   Ambulation/Elevation   Gait pattern Excessively slow;Short stride;Foward flexed;Inconsistent jessa   Gait Assistance 3  Moderate assist  (after initial min (A) but became progressively weaker during subsequent bouts of amb)   Additional items Assist x 1;Verbal cues;Tactile cues   Assistive Device Rolling walker   Distance 70 ft + 50 ft + 50 ft + 70 ft w/ extended seated rest periods in between   Balance   Static Sitting Fair   Dynamic Sitting Fair -   Static  Standing Fair -   Dynamic Standing Poor +   Ambulatory Poor +   Activity Tolerance   Activity Tolerance Patient limited by fatigue   Nurse Made Aware spoke to LAUREL Lima   Assessment   Prognosis Good   Problem List Decreased strength;Decreased endurance;Impaired balance;Decreased mobility;Decreased safety awareness;Decreased cognition   Assessment Pt cont to demonstrate overall weakness, deconditioning and decreased functional endurance w/ extended seated rest periods required b/in bouts of amb; min (A) was initially needed for amb but progressed to mod (A) due to increasing weakness and fatigue; pt remained in NAD and appeared to be comfortable at the end of session; D/C recommendations are listed below; will follow   Barriers to Discharge Decreased caregiver support   Goals   Patient Goals to rest   STG Expiration Date 11/17/24   PT Treatment Day 2   Plan   Treatment/Interventions Functional transfer training;LE strengthening/ROM;Elevations;Therapeutic exercise;Endurance training;Cognitive reorientation;Bed mobility;Gait training;Spoke to nursing;Spoke to case management   Progress Progressing toward goals   PT Frequency 3-5x/wk   Discharge Recommendation   Rehab Resource Intensity Level, PT I (Maximum Resource Intensity)   Equipment Recommended Walker   Walker Package Recommended Wheeled walker   Change/add to Walker Package? No   AM-PAC Basic Mobility Inpatient   Turning in Flat Bed Without Bedrails 4   Lying on Back to Sitting on Edge of Flat Bed Without Bedrails 3   Moving Bed to Chair 3   Standing Up From Chair Using Arms 3   Walk in Room 3   Climb 3-5 Stairs With Railing 2   Basic Mobility Inpatient Raw Score 18   Basic Mobility Standardized Score 41.05   [facility] Highest Level Of Mobility   -HLM Goal 6: Walk 10 steps or more   JH-HLM Achieved 7: Walk 25 feet or more   Education   Education Provided Mobility training;Assistive device   Patient Demonstrates verbal understanding;Reinforcement needed    End of Consult   Patient Position at End of Consult Supine;Bed/Chair alarm activated;All needs within reach       Jono Christianson

## 2024-11-05 NOTE — CASE MANAGEMENT
Per [employer], Acute Rehab auth still pending.     Escalation email sent to [facility] Escalation Team requesting expedite of determination.     CM notified:  Tahmina Mcintosh

## 2024-11-05 NOTE — UTILIZATION REVIEW
Continued Stay Review    Date: 11/5/24                          Current Patient Class: Inpatient  Current Level of Care: critical care    HPI:64 y.o. male initially admitted on 10/29/24 Penetrating atherosclerotic ulcer of aorta     Assessment/Plan:  No new complaints or concerns by pt. Per Cardiothoracic sx, no plans for surgical management at this time. Continue to monitor BP, order nicotine patch. Continue CIWA with thiamine and folate. Continue ASA, Plavix, statin due to CVA.  Pt will need acute rehab at time of discharge. CM following.     Medications:   Scheduled Medications:  amLODIPine, 5 mg, Oral, Daily  aspirin, 81 mg, Oral, Daily  atorvastatin, 80 mg, Oral, Daily With Dinner  carvedilol, 12.5 mg, Oral, BID With Meals  chlorhexidine, 15 mL, Mouth/Throat, Q12H SHEA  clopidogrel, 75 mg, Oral, Daily  folic acid, 800 mcg, Oral, Daily  heparin (porcine), 5,000 Units, Subcutaneous, Q8H SHEA  hydroCHLOROthiazide, 12.5 mg, Oral, Daily  nicotine, 14 mg, Transdermal, Daily  PHENobarbital, 30 mg, Intravenous, TID   Followed by  [START ON 11/7/2024] PHENobarbital, 15 mg, Intravenous, TID  thiamine, 100 mg, Oral, Daily      Continuous IV Infusions: none     PRN Meds:  hydrALAZINE, 5 mg, Intravenous, Q4H PRN  labetalol, 10 mg, Intravenous, Q4H PRN      Discharge Plan: tbd    Vital Signs (last 3 days)       Date/Time Temp Pulse Resp BP MAP (mmHg) SpO2 O2 Device Patient Position - Orthostatic VS Jeanine Coma Scale Score CIWA-Ar Total Pain    11/05/24 0900 97.8 °F (36.6 °C) -- -- -- -- -- -- -- -- -- --    11/05/24 0800 -- 71 -- 127/60 86 97 % -- -- 15 -- No Pain    11/05/24 0700 -- 62 -- 136/59 85 97 % -- -- -- -- --    11/05/24 0600 -- 61 -- 134/63 91 95 % -- -- -- -- --    11/05/24 0400 -- 66 -- 116/72 -- -- -- -- -- 0 --    11/05/24 0300 -- 64 -- 120/56 81 97 % -- -- -- -- --    11/05/24 0000 -- 65 -- 137/65 -- -- -- -- -- 0 --    11/04/24 22:22:19 98.5 °F (36.9 °C) -- -- -- -- -- -- -- -- -- --    11/04/24 2222 -- -- 16  148/68 98 -- -- Lying -- -- --    11/04/24 2100 -- -- -- -- -- -- -- -- -- -- No Pain    11/04/24 2000 -- 66 -- 135/63 -- -- -- -- 15 0 --    11/04/24 19:35:04 98.5 °F (36.9 °C) -- 20 -- -- -- -- -- -- -- --    11/04/24 1600 -- -- -- -- -- -- -- -- 15 -- --    11/04/24 1500 -- -- -- 131/64 -- -- -- -- -- -- --    11/04/24 1429 97.7 °F (36.5 °C) 73 21 121/64 86 -- -- Lying -- -- --    11/04/24 14:06:42 -- 78 -- 120/58 -- 99 % -- -- -- -- --    11/04/24 14:00:53 -- 78 -- 104/62 -- 100 % -- -- -- -- --    11/04/24 1400 -- 73 -- 121/64 -- -- -- -- -- -- --    11/04/24 1137 -- -- -- -- -- -- -- -- -- -- 3    11/04/24 1100 98.6 °F (37 °C) 64 19 121/64 86 -- -- Sitting -- -- --    11/04/24 0835 -- -- -- -- -- -- -- -- -- -- 3    11/04/24 0819 -- -- -- 120/67 -- -- -- -- -- -- --    11/04/24 0800 -- -- -- -- -- -- -- -- 15 -- No Pain    11/04/24 07:21:16 98.6 °F (37 °C) -- 16 110/61 77 -- -- -- -- -- --    11/04/24 0430 -- -- -- 107/45 66 -- -- -- -- -- --    11/04/24 0400 -- 89 -- 138/64 89 -- -- -- -- 0 --    11/04/24 0300 -- -- -- 107/47 67 -- -- -- -- -- --    11/04/24 0230 -- 67 18 117/53 74 100 % None (Room air) -- -- -- --    11/04/24 0200 -- -- -- 139/58 85 -- -- -- -- -- --    11/04/24 0130 -- -- -- 145/65 92 -- -- -- -- -- --    11/04/24 0100 -- -- -- 149/63 92 -- -- -- -- -- --    11/04/24 00:55:07 -- -- -- 149/63 92 -- -- -- -- -- --    11/04/24 00:23:10 -- -- -- 138/72 94 -- -- -- -- -- --    11/04/24 0000 -- 69 -- 157/79 -- -- -- -- -- -- --    11/03/24 2300 -- -- -- 157/79 105 -- -- -- -- -- --    11/03/24 2230 -- -- -- 136/61 86 -- -- -- -- -- --    11/03/24 2206 -- -- -- 146/63 -- -- -- -- -- -- --    11/03/24 2200 -- -- -- 146/63 91 -- -- -- -- -- --    11/03/24 2130 -- -- -- 159/70 100 -- -- -- -- -- --    11/03/24 2100 -- -- -- 131/61 84 -- None (Room air) -- 15 -- No Pain    11/03/24 2030 -- -- -- 153/55 88 -- -- -- -- -- --    11/03/24 2000 -- 67 -- 147/62 90 -- -- -- -- 0 --    11/03/24 19:46:17 -- --  -- 150/57 88 -- -- -- -- -- --    11/03/24 1930 -- -- -- 140/65 90 -- -- -- -- -- --    11/03/24 19:23:02 100.3 °F (37.9 °C) -- 18 140/65 90 -- -- -- -- -- --    11/03/24 15:38:42 98.9 °F (37.2 °C) 66 -- 133/75 94 97 % -- -- -- 0 --    11/03/24 11:23:45 99 °F (37.2 °C) 69 -- 128/68 88 96 % -- -- -- 0 --    11/03/24 1101 -- -- -- -- -- -- -- -- -- -- No Pain    11/03/24 1100 -- -- -- -- -- -- -- -- -- -- No Pain    11/03/24 0800 -- -- -- -- -- -- -- -- 14 -- No Pain    11/03/24 07:27:47 98.7 °F (37.1 °C) 70 -- 149/72 98 97 % -- -- -- 0 --    11/03/24 04:51:14 -- 78 -- 140/72 95 97 % -- -- -- -- --    11/03/24 0400 -- 74 -- 140/72 -- -- -- -- -- 0 --    11/03/24 0305 -- 64 -- -- -- -- -- -- -- 14 --    11/03/24 0206 98.4 °F (36.9 °C) 73 16 128/81 97 97 % -- -- -- -- --    11/03/24 0055 -- 70 -- -- -- -- -- -- -- 17 --    11/03/24 00:20:09 -- 70 -- 149/75 100 98 % -- -- -- 1 --    11/02/24 22:32:02 98.6 °F (37 °C) 73 18 151/70 97 98 % -- -- -- -- --    11/02/24 2000 -- 67 -- 118/53 -- -- -- -- -- 1 --    11/02/24 1947 -- -- -- -- -- 96 % None (Room air) -- 14 -- No Pain    11/02/24 18:59:39 98.9 °F (37.2 °C) 67 16 118/53 75 95 % -- -- -- -- --    11/02/24 17:06:27 -- 75 -- 142/77 99 92 % -- -- -- -- --    11/02/24 1627 -- 66 -- 168/83 -- -- -- -- -- 0 --    11/02/24 15:06:53 98.7 °F (37.1 °C) 71 -- 158/77 104 98 % -- -- -- -- --    11/02/24 15:06:47 98.7 °F (37.1 °C) 71 16 158/77 104 90 % -- -- -- -- --    11/02/24 11:20:59 98.9 °F (37.2 °C) 67 -- 151/69 96 97 % -- -- -- 0 --    11/02/24 0854 -- 71 -- 126/67 -- -- -- -- -- 0 --    11/02/24 0800 -- -- -- -- -- -- -- -- 14 -- No Pain    11/02/24 0709 -- 73 -- 159/76 -- -- -- -- -- -- --    11/02/24 0400 -- 71 20 140/64 100 -- -- -- -- 0 --    11/02/24 02:42:54 97.9 °F (36.6 °C) 71 22 140/65 90 96 % -- -- -- -- --    11/02/24 01:57:53 -- 74 -- 140/82 101 96 % -- -- -- -- --    11/02/24 01:04:13 -- 69 -- 143/73 96 95 % -- -- -- -- --    11/02/24 0000 -- 69 -- 143/73 -- --  -- -- -- 0 --          Weight (last 2 days)       Date/Time Weight    11/05/24 0515 88.2 (194.45)    11/04/24 1400 88 (194)    11/04/24 0702 88 (194.01)    11/03/24 0600 88.5 (195.11)           CIWA-Ar Score       [facility] Name 11/05/24 0400 11/05/24 0000 11/04/24 2000       CIWA-Ar    /72 137/65 135/63    Pulse 66 65 66    Nausea and Vomiting 0 0 0    Tactile Disturbances 0 0 0    Tremor 0 0 0    Auditory Disturbances 0 0 0    Paroxysmal Sweats 0 0 0    Visual Disturbances 0 0 0    Anxiety 0 0 0    Headache, Fullness in Head 0 0 0    Agitation 0 0 0    Orientation and Clouding of Sensorium 0 0 0    CIWA-Ar Total 0 0 0      Row Name 11/04/24 0400 11/04/24 0000 11/03/24 2000       CIWA-Ar    BP -- 157/79 --    Pulse 89 69 67    Nausea and Vomiting 0 0 0    Tactile Disturbances 0 0 0    Tremor 0 0 0    Auditory Disturbances 0 0 0    Paroxysmal Sweats 0 0 0    Visual Disturbances 0 0 0    Anxiety 0 0 0    Headache, Fullness in Head 0 0 0    Agitation 0 0 0    Orientation and Clouding of Sensorium 0 -- 0    CIWA-Ar Total 0 -- 0      Row Name 11/03/24 15:38:42 11/03/24 11:23:45 11/03/24 07:27:47       CIWA-Ar    Nausea and Vomiting 0 0 0    Tactile Disturbances 0 0 0    Tremor 0 0 0    Auditory Disturbances 0 0 0    Paroxysmal Sweats 0 0 0    Visual Disturbances 0 0 0    Anxiety 0 0 0    Headache, Fullness in Head 0 0 0    Agitation 0 0 0    Orientation and Clouding of Sensorium 0 0 0    CIWA-Ar Total 0 0 0      Row Name 11/03/24 0400 11/03/24 0305 11/03/24 0055       CIWA-Ar    /72 -- --    Pulse 74 64 70    Nausea and Vomiting 0 0 0    Tactile Disturbances 0 0 0    Tremor 0 0 0    Auditory Disturbances 0 0 3    Paroxysmal Sweats 0 0 0    Visual Disturbances 0 4 3    Anxiety 0 4 5    Headache, Fullness in Head 0 0 0    Agitation 0 5 5    Orientation and Clouding of Sensorium 0 1 1    CIWA-Ar Total 0 14 17      Row Name 11/03/24 00:20:09 11/02/24 2000 11/02/24 1627       KELSEY-Ar    BP -- 118/53 --    Pulse -- 67  66    Nausea and Vomiting 0 0 0    Tactile Disturbances 0 0 0    Tremor 0 0 0    Auditory Disturbances 0 0 0    Paroxysmal Sweats 0 1 0    Visual Disturbances 0 0 0    Anxiety 1 0 0    Headache, Fullness in Head 0 0 0    Agitation 0 0 0    Orientation and Clouding of Sensorium 0 0 0    CIWA-Ar Total 1 1 0                  Pertinent Labs/Diagnostic Results:   Radiology:  MRI brain wo contrast   Final Interpretation by Jacky Delgado MD (11/01 1520)   Acute infarcts involving the right corona radiata/gangliocapsular region, left gangliocapsular region and right parietal lobe. Mild mass effect without midline shift.      Moderate-marked chronic microangiopathy.      I personally discussed this study with BRITTANIE CLEMONS on 11/1/2024 3:20 PM.            Workstation performed: [id number]           Cardiology:  ELICEO   Final Result by Lori Whitman MD (11/04 1640)        Left Ventricle: Left ventricular cavity size is normal. Wall thickness    is increased. The left ventricular ejection fraction is 55% by visual    estimation. Systolic function is normal. Wall motion is normal.     Right Ventricle: Right ventricular cavity size is normal. Systolic    function is normal.     Atrial Septum: No patent foramen ovale detected, confirmed at rest    using color doppler and using agitated saline contrast, confirmed by    provocation with abdominal compression, using agitated saline contrast.     Aortic Valve: There is mild regurgitation with a centrally directed    jet.     Aorta: The aortic root is mildly dilated. The ascending aorta is normal    in size. The aortic root is 4.10 cm. The ascending aorta is 3.6 cm.      The procedure was aborted early due to hypoxia from respiratory distress,    likely laryngospasm and secretions.    No evidence of intracardiac shunt with color Doppler and agitated saline    contrast bubble study.          Echo complete w/ contrast if indicated   Final Result by Ronan Rivera MD (10/30 2216)         Left Ventricle: Left ventricular cavity size is normal. Wall thickness    is moderately increased. There is mild concentric hypertrophy. The left    ventricular ejection fraction is 65%. Systolic function is normal. Wall    motion is normal. Diastolic function is mildly abnormal, consistent with    grade I (abnormal) relaxation.     Aorta: The aortic root is moderately dilated. The ascending aorta is    normal in size. The aortic root is 4.50 cm. The ascending aorta is 3.7 cm.         ECG 12 lead   Final Result by Steven Kincaid MD (10/29 2112)   Normal sinus rhythm   ST & T wave abnormality, consider inferior ischemia   ST & T wave abnormality, consider anterolateral ischemia   Abnormal ECG   When compared with ECG of 29-OCT-2024 19:38, (unconfirmed)   No significant change was found   Confirmed by Steven Kincaid ([id number]) on 10/29/2024 9:12:47 PM      ECG 12 lead   Final Result by Steven Kincaid MD (10/29 2115)   Normal sinus rhythm   Cannot rule out Inferior infarct , age undetermined   ST & T wave abnormality, consider anterolateral ischemia   Abnormal ECG   When compared with ECG of 29-OCT-2024 14:09,   No significant change was found   Confirmed by Steven Kincaid ([id number]) on 10/29/2024 9:15:28 PM        GI:  No orders to display           Results from last 7 days   Lab Units 11/04/24  0449 11/01/24  0444 10/31/24  0604 10/30/24  0447 10/29/24  1430   WBC Thousand/uL 4.89 5.96 5.20 5.41 5.12   HEMOGLOBIN g/dL 13.3 13.4 12.7 13.1 14.1   HEMATOCRIT % 37.9 39.9 37.7 37.6 41.2   PLATELETS Thousands/uL 172 182 175 182 207   TOTAL NEUT ABS Thousands/µL  --  4.80  --   --   --          Results from last 7 days   Lab Units 11/04/24 0449 11/02/24 0443 11/01/24  0511 10/31/24  0604 10/30/24  0447   SODIUM mmol/L 132* 136 136 135 135   POTASSIUM mmol/L 3.8 3.7 4.2 4.0 4.0   CHLORIDE mmol/L 98 107 106 103 104   CO2 mmol/L 25 23 19* 25 21   ANION GAP mmol/L 9 6 11 7 10   BUN mg/dL 15 20 17 14 13    CREATININE mg/dL 0.87 0.91 0.92 0.94 1.01   EGFR ml/min/1.73sq m 91 89 88 85 78   CALCIUM mg/dL 8.8 8.4 8.5 8.4 8.4   CALCIUM, IONIZED mmol/L  --   --   --  1.16 1.10*   MAGNESIUM mg/dL 1.9 2.1 2.4 2.1 2.1   PHOSPHORUS mg/dL  --   --   --  3.0 3.5     Results from last 7 days   Lab Units 10/31/24  0604 10/30/24  0447 10/29/24  1430   AST U/L 19 22 27   ALT U/L 14 16 21   ALK PHOS U/L 50 52 52   TOTAL PROTEIN g/dL 6.5 6.5 7.3   ALBUMIN g/dL 3.8 3.9 4.3   TOTAL BILIRUBIN mg/dL 1.05* 1.14* 1.17*   BILIRUBIN DIRECT mg/dL 0.30*  --   --      Results from last 7 days   Lab Units 10/30/24  0726 10/30/24  0446 10/29/24  1432   POC GLUCOSE mg/dl 106 116 132     Results from last 7 days   Lab Units 11/04/24  0449 11/02/24  0443 11/01/24  0511 10/31/24  0604 10/30/24  0447 10/29/24  1430   GLUCOSE RANDOM mg/dL 88 88 90 96 110 130         Results from last 7 days   Lab Units 10/30/24  0447   HEMOGLOBIN A1C % 5.5   EAG mg/dl 111     Results from last 7 days   Lab Units 11/01/24  0511   CK TOTAL U/L 648*     Results from last 7 days   Lab Units 10/29/24  1849 10/29/24  1645 10/29/24  1430   HS TNI 0HR ng/L  --   --  11   HS TNI 2HR ng/L  --  12  --    HSTNI D2 ng/L  --  1  --    HS TNI 4HR ng/L 10  --   --    HSTNI D4 ng/L -1  --   --          Results from last 7 days   Lab Units 10/30/24  0447 10/29/24  1430   PROTIME seconds 15.0 15.7*   INR  1.15 1.20*   PTT seconds  --  26     Results from last 7 days   Lab Units 11/01/24  0642   LACTIC ACID mmol/L 0.6     Network Utilization Review Department  ATTENTION: Please call with any questions or concerns to [phone] and carefully listen to the prompts so that you are directed to the right person. All voicemails are confidential.   For Discharge needs, contact Care Management DC Support Team at [phone] opt. 2  Send all requests for admission clinical reviews, approved or denied determinations and any other requests to dedicated fax number below belonging to the campus  where the patient is receiving treatment. List of dedicated fax numbers for the Facilities:  FACILITY NAME UR FAX NUMBER   ADMISSION DENIALS (Administrative/Medical Necessity) [phone]   DISCHARGE SUPPORT TEAM (NETWORK) [phone]   PARENT CHILD HEALTH (Maternity/NICU/Pediatrics) [phone]   [facility] [phone]   [facility] [phone]   [facility] [phone]   [facility] [phone]   [facility] [phone]   [facility] [phone]   [facility] [phone]   [facility] [phone]   [facility] [phone]   [facility] [phone]   [facility] [phone]   [facility] [phone]

## 2024-11-05 NOTE — ASSESSMENT & PLAN NOTE
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  No plans for surgical management at this time  Blood pressure currently controlled

## 2024-11-05 NOTE — TELEPHONE ENCOUNTER
STILL ADMITTED:10/29/2024 - present (7 days)  [facility]      [facility] / SILVINA RODERICK/ CVA      ----- Message from Anirudh Marino PA-C sent at 11/2/2024 11:04 AM EDT -----  Regarding: Hospital follow up  will need follow-up in in 6 weeks with neurovascular team for Other in 60 minute appointment. They will not require outpatient neurological testing.

## 2024-11-05 NOTE — PROGRESS NOTES
"Progress Note - Hospitalist   Name: Vlad Gregorio 64 y.o. male I MRN: [id number]  Unit/Bed#: PPHP-317-01 I Date of Admission: 10/29/2024   Date of Service: 11/5/2024 I Hospital Day: 7    Assessment & Plan  Penetrating atherosclerotic ulcer of aorta (HCC)  CT concerning for penetrating aortic arch ulcer without evidence of extravasation or dissection  Patient has been evaluated by cardiothoracic surgery and we appreciate their expertise.  No plans for surgical management at this time  Blood pressure currently controlled  Tobacco abuse  Ordered Nicotine patch  Alcohol abuse  On admission patient reported drinking a case of beer over several days with a course of a week.  Earlier in stay he began going through withdrawals and has since admitted that he drinks about half a case of beer a day  Continue thiamine and folate  CVA (cerebral vascular accident) (HCC)  Patient presented to the ED for lower extremity weakness, CT of the head showing \"acute infarcts involving the right corona radiata/gangliocapsular region, left gangliocapsular region and right parietal lobe\"   MRI revealed \"Acute infarcts involving the right corona radiata/gangliocapsular region, left gangliocapsular region and right parietal lobe. Mild mass effect without midline shift\"  ELICEO ruled out thromboembolic phenomenon  Continue aspirin, Plavix, statin  Will need rehab on discharge    VTE Pharmacologic Prophylaxis:   Moderate Risk (Score 3-4) - Pharmacological DVT Prophylaxis Ordered: heparin.    Mobility:   Basic Mobility Inpatient Raw Score: 15  JH-HLM Goal: 4: Move to chair/commode  JH-HLM Achieved: 2: Bed activities/Dependent transfer  JH-HLM Goal achieved. Continue to encourage appropriate mobility.    Patient Centered Rounds: I performed bedside rounds with nursing staff today.   Discussions with Specialists or Other Care Team Provider: cm, nursing    Education and Discussions with Family / Patient: Updated  (daughter) via " phone.    Current Length of Stay: 7 day(s)  Current Patient Status: Inpatient   Certification Statement: The patient will continue to require additional inpatient hospital stay due to see below  Discharge Plan:  Anticipate medically clear in the next 24 to 48 hours.  Will need rehab on discharge    Code Status: Level 1 - Full Code    Subjective   Denies chest pain, shortness of breath, cough, fevers, chills    Objective :  Temp:  [97.7 °F (36.5 °C)-98.6 °F (37 °C)] 97.8 °F (36.6 °C)  HR:  [61-78] 71  BP: (104-148)/(56-72) 127/60  Resp:  [16-21] 16  SpO2:  [95 %-100 %] 97 %  O2 Device: EtCO2 mask    Body mass index is 31.38 kg/m².     Input and Output Summary (last 24 hours):     Intake/Output Summary (Last 24 hours) at 11/5/2024 1059  Last data filed at 11/5/2024 0813  Gross per 24 hour   Intake 670 ml   Output 850 ml   Net -180 ml       Physical Exam  Constitutional:       General: He is not in acute distress.     Appearance: He is well-developed. He is not diaphoretic.   HENT:      Head: Normocephalic and atraumatic.      Nose: Nose normal.      Mouth/Throat:      Pharynx: No oropharyngeal exudate.   Eyes:      General: No scleral icterus.     Conjunctiva/sclera: Conjunctivae normal.   Neck:      Vascular: No JVD.   Cardiovascular:      Rate and Rhythm: Normal rate and regular rhythm.      Heart sounds: Normal heart sounds. No murmur heard.     No friction rub. No gallop.   Pulmonary:      Effort: Pulmonary effort is normal. No respiratory distress.      Breath sounds: Normal breath sounds. No wheezing or rales.   Chest:      Chest wall: No tenderness.   Abdominal:      General: Bowel sounds are normal. There is no distension.      Palpations: Abdomen is soft.      Tenderness: There is no abdominal tenderness. There is no guarding.   Musculoskeletal:         General: No tenderness or deformity. Normal range of motion.      Cervical back: Normal range of motion.   Lymphadenopathy:      Cervical: No cervical  adenopathy.   Skin:     General: Skin is warm and dry.      Findings: No erythema.   Neurological:      Mental Status: He is alert. Mental status is at baseline.      Cranial Nerves: No cranial nerve deficit.      Coordination: Coordination normal.      Deep Tendon Reflexes: Reflexes normal.           Lines/Drains:        Telemetry:  Telemetry Orders (From admission, onward)               24 Hour Telemetry Monitoring  Continuous x 24 Hours (Telem)        Question:  Reason for 24 Hour Telemetry  Answer:  TIA/Suspected CVA/ Confirmed CVA                     Telemetry Reviewed: Normal Sinus Rhythm  Indication for Continued Telemetry Use: Acute CVA               Lab Results: I have reviewed the following results:   Results from last 7 days   Lab Units 11/04/24 0449 11/01/24  0444   WBC Thousand/uL 4.89 5.96   HEMOGLOBIN g/dL 13.3 13.4   HEMATOCRIT % 37.9 39.9   PLATELETS Thousands/uL 172 182   SEGS PCT %  --  81*   LYMPHO PCT %  --  9*   MONO PCT %  --  9   EOS PCT %  --  0     Results from last 7 days   Lab Units 11/04/24 0449 11/01/24  0511 10/31/24  0604   SODIUM mmol/L 132*   < > 135   POTASSIUM mmol/L 3.8   < > 4.0   CHLORIDE mmol/L 98   < > 103   CO2 mmol/L 25   < > 25   BUN mg/dL 15   < > 14   CREATININE mg/dL 0.87   < > 0.94   ANION GAP mmol/L 9   < > 7   CALCIUM mg/dL 8.8   < > 8.4   ALBUMIN g/dL  --   --  3.8   TOTAL BILIRUBIN mg/dL  --   --  1.05*   ALK PHOS U/L  --   --  50   ALT U/L  --   --  14   AST U/L  --   --  19   GLUCOSE RANDOM mg/dL 88   < > 96    < > = values in this interval not displayed.     Results from last 7 days   Lab Units 10/30/24  0447   INR  1.15     Results from last 7 days   Lab Units 10/30/24  0726 10/30/24  0446 10/29/24  1432   POC GLUCOSE mg/dl 106 116 132     Results from last 7 days   Lab Units 10/30/24  0447   HEMOGLOBIN A1C % 5.5     Results from last 7 days   Lab Units 11/01/24  0642   LACTIC ACID mmol/L 0.6       Recent Cultures (last 7 days):         Imaging Results  Review: I personally reviewed the following image studies/reports in PACS and discussed pertinent findings with Radiology: chest xray. My interpretation of the radiology images/reports is:  .  Other Study Results Review: EKG was reviewed.     Last 24 Hours Medication List:     Current Facility-Administered Medications:     amLODIPine (NORVASC) tablet 5 mg, Daily    aspirin (ECOTRIN LOW STRENGTH) EC tablet 81 mg, Daily    atorvastatin (LIPITOR) tablet 80 mg, Daily With Dinner    carvedilol (COREG) tablet 12.5 mg, BID With Meals    chlorhexidine (PERIDEX) 0.12 % oral rinse 15 mL, Q12H SHEA    clopidogrel (PLAVIX) tablet 75 mg, Daily    folic acid (FOLVITE) tablet 800 mcg, Daily    heparin (porcine) subcutaneous injection 5,000 Units, Q8H SHEA    hydrALAZINE (APRESOLINE) injection 5 mg, Q4H PRN    hydroCHLOROthiazide tablet 12.5 mg, Daily    labetalol (NORMODYNE) injection 10 mg, Q4H PRN    nicotine (NICODERM CQ) 14 mg/24hr TD 24 hr patch 14 mg, Daily    [COMPLETED] PHENobarbital injection 90 mg, TID **FOLLOWED BY** [COMPLETED] PHENobarbital injection 60 mg, TID **FOLLOWED BY** PHENobarbital injection 30 mg, TID **FOLLOWED BY** [START ON 11/7/2024] PHENobarbital injection 15 mg, TID    thiamine tablet 100 mg, Daily    Administrative Statements   Today, Patient Was Seen By: Thomas Bonds MD  I have spent a total time of 30 minutes in caring for this patient on the day of the visit/encounter including Diagnostic results.    **Please Note: This note may have been constructed using a voice recognition system.**

## 2024-11-05 NOTE — CASE MANAGEMENT
[facility] has received APPROVED authorization.  Insurance:   [employer]  Auth obtained via Insurance Rep: Gabriella LLAMAS#: [phone]  Authorization received for: Acute Rehab  Facility: [facility]   Authorization #: [id number]   Start of Care: 11/5  Next Review Date: 11/10  Continued Stay Care Coordinator: none given  Submit next review to: [phone]     Care Manager notified: Tahmina Mcintosh    Please reach out to CM for updates on any clinical information.

## 2024-11-05 NOTE — PLAN OF CARE
Problem: PHYSICAL THERAPY ADULT  Goal: Performs mobility at highest level of function for planned discharge setting.  See evaluation for individualized goals.  Description: Treatment/Interventions: ADL retraining, Functional transfer training, LE strengthening/ROM, Elevations, Therapeutic exercise, Cognitive reorientation, Bed mobility, Gait training, Spoke to nursing, OT  Equipment Recommended: Walker       See flowsheet documentation for full assessment, interventions and recommendations.  Outcome: Progressing  Note: Prognosis: Good  Problem List: Decreased strength, Decreased endurance, Impaired balance, Decreased mobility, Decreased safety awareness, Decreased cognition  Assessment: Pt cont to demonstrate overall weakness, deconditioning and decreased functional endurance w/ extended seated rest periods required b/in bouts of amb; min (A) was initially needed for amb but progressed to mod (A) due to increasing weakness and fatigue; pt remained in NAD and appeared to be comfortable at the end of session; D/C recommendations are listed below; will follow  Barriers to Discharge: Decreased caregiver support     Rehab Resource Intensity Level, PT: I (Maximum Resource Intensity)    See flowsheet documentation for full assessment.

## 2024-11-05 NOTE — ASSESSMENT & PLAN NOTE
"Patient presented to the ED for lower extremity weakness, CT of the head showing \"acute infarcts involving the right corona radiata/gangliocapsular region, left gangliocapsular region and right parietal lobe\"   MRI revealed \"Acute infarcts involving the right corona radiata/gangliocapsular region, left gangliocapsular region and right parietal lobe. Mild mass effect without midline shift\"  ELICEO ruled out thromboembolic phenomenon  Continue aspirin, Plavix, statin  Will need rehab on discharge  "

## 2024-11-05 NOTE — CASE MANAGEMENT
Case Management Discharge Planning Note    Patient name Vlad Gregorio  Location [facility]01 MRN [id number]  : 1960 Date 2024       Current Admission Date: 10/29/2024  Current Admission Diagnosis:Penetrating atherosclerotic ulcer of aorta (HCC)   Patient Active Problem List    Diagnosis Date Noted Date Diagnosed    CVA (cerebral vascular accident) (HCC) 2024     Right leg weakness 10/29/2024     Alcohol abuse 10/29/2024     Penetrating atherosclerotic ulcer of aorta (HCC) 10/29/2024     Syncope and collapse 2018     Tobacco abuse 2018       LOS (days): 7  Geometric Mean LOS (GMLOS) (days): 3.1  Days to GMLOS:-3.7     OBJECTIVE:  Risk of Unplanned Readmission Score: 10.59         Current admission status: Inpatient   Preferred Pharmacy:   UNKNOWN - FOLLOW UP PRIOR TO DISCHARGE TO E-PRESCRIBE  No address on file      Primary Care Provider: Arthur Watts MD    Primary Insurance: AETNA  Secondary Insurance:     DISCHARGE DETAILS:                                                                                                               Facility Insurance Auth Number: [id number]

## 2024-11-06 VITALS
DIASTOLIC BLOOD PRESSURE: 66 MMHG | TEMPERATURE: 98.5 F | HEART RATE: 64 BPM | WEIGHT: 196.21 LBS | OXYGEN SATURATION: 94 % | HEIGHT: 66 IN | RESPIRATION RATE: 16 BRPM | SYSTOLIC BLOOD PRESSURE: 131 MMHG | BODY MASS INDEX: 31.53 KG/M2

## 2024-11-06 LAB
ANION GAP SERPL CALCULATED.3IONS-SCNC: 7 MMOL/L (ref 4–13)
BASOPHILS # BLD AUTO: 0.04 THOUSANDS/ΜL (ref 0–0.1)
BASOPHILS NFR BLD AUTO: 1 % (ref 0–1)
BUN SERPL-MCNC: 20 MG/DL (ref 5–25)
CALCIUM SERPL-MCNC: 8.4 MG/DL (ref 8.4–10.2)
CHLORIDE SERPL-SCNC: 96 MMOL/L (ref 96–108)
CO2 SERPL-SCNC: 27 MMOL/L (ref 21–32)
CREAT SERPL-MCNC: 0.98 MG/DL (ref 0.6–1.3)
EOSINOPHIL # BLD AUTO: 0.07 THOUSAND/ΜL (ref 0–0.61)
EOSINOPHIL NFR BLD AUTO: 1 % (ref 0–6)
ERYTHROCYTE [DISTWIDTH] IN BLOOD BY AUTOMATED COUNT: 12.9 % (ref 11.6–15.1)
GFR SERPL CREATININE-BSD FRML MDRD: 81 ML/MIN/1.73SQ M
GLUCOSE SERPL-MCNC: 107 MG/DL (ref 65–140)
HCT VFR BLD AUTO: 36.1 % (ref 36.5–49.3)
HGB BLD-MCNC: 12.5 G/DL (ref 12–17)
IMM GRANULOCYTES # BLD AUTO: 0.03 THOUSAND/UL (ref 0–0.2)
IMM GRANULOCYTES NFR BLD AUTO: 1 % (ref 0–2)
LYMPHOCYTES # BLD AUTO: 0.64 THOUSANDS/ΜL (ref 0.6–4.47)
LYMPHOCYTES NFR BLD AUTO: 12 % (ref 14–44)
MCH RBC QN AUTO: 29.1 PG (ref 26.8–34.3)
MCHC RBC AUTO-ENTMCNC: 34.6 G/DL (ref 31.4–37.4)
MCV RBC AUTO: 84 FL (ref 82–98)
MONOCYTES # BLD AUTO: 0.74 THOUSAND/ΜL (ref 0.17–1.22)
MONOCYTES NFR BLD AUTO: 14 % (ref 4–12)
NEUTROPHILS # BLD AUTO: 3.8 THOUSANDS/ΜL (ref 1.85–7.62)
NEUTS SEG NFR BLD AUTO: 71 % (ref 43–75)
NRBC BLD AUTO-RTO: 0 /100 WBCS
PLATELET # BLD AUTO: 192 THOUSANDS/UL (ref 149–390)
PMV BLD AUTO: 9.6 FL (ref 8.9–12.7)
POTASSIUM SERPL-SCNC: 4.1 MMOL/L (ref 3.5–5.3)
RBC # BLD AUTO: 4.29 MILLION/UL (ref 3.88–5.62)
SODIUM SERPL-SCNC: 130 MMOL/L (ref 135–147)
WBC # BLD AUTO: 5.32 THOUSAND/UL (ref 4.31–10.16)

## 2024-11-06 PROCEDURE — 97110 THERAPEUTIC EXERCISES: CPT

## 2024-11-06 PROCEDURE — 85025 COMPLETE CBC W/AUTO DIFF WBC: CPT | Performed by: INTERNAL MEDICINE

## 2024-11-06 PROCEDURE — 97530 THERAPEUTIC ACTIVITIES: CPT

## 2024-11-06 PROCEDURE — 97116 GAIT TRAINING THERAPY: CPT

## 2024-11-06 PROCEDURE — 97535 SELF CARE MNGMENT TRAINING: CPT

## 2024-11-06 PROCEDURE — 99232 SBSQ HOSP IP/OBS MODERATE 35: CPT | Performed by: INTERNAL MEDICINE

## 2024-11-06 PROCEDURE — 80048 BASIC METABOLIC PNL TOTAL CA: CPT | Performed by: INTERNAL MEDICINE

## 2024-11-06 RX ORDER — ATORVASTATIN CALCIUM 80 MG/1
80 TABLET, FILM COATED ORAL
Qty: 30 TABLET | Refills: 0 | Status: SHIPPED | OUTPATIENT
Start: 2024-11-06

## 2024-11-06 RX ORDER — CLOPIDOGREL BISULFATE 75 MG/1
75 TABLET ORAL DAILY
Qty: 30 TABLET | Refills: 0 | Status: SHIPPED | OUTPATIENT
Start: 2024-11-07

## 2024-11-06 RX ORDER — PHENOBARBITAL 15 MG/1
15 TABLET ORAL 2 TIMES DAILY
Qty: 6 TABLET | Refills: 0 | Status: SHIPPED | OUTPATIENT
Start: 2024-11-06 | End: 2024-11-06

## 2024-11-06 RX ORDER — ASPIRIN 81 MG/1
81 TABLET ORAL DAILY
Qty: 30 TABLET | Refills: 0 | Status: SHIPPED | OUTPATIENT
Start: 2024-11-07

## 2024-11-06 RX ORDER — HYDROCHLOROTHIAZIDE 12.5 MG/1
12.5 TABLET ORAL DAILY
Qty: 30 TABLET | Refills: 0 | Status: SHIPPED | OUTPATIENT
Start: 2024-11-07

## 2024-11-06 RX ORDER — CARVEDILOL 12.5 MG/1
12.5 TABLET ORAL 2 TIMES DAILY WITH MEALS
Qty: 60 TABLET | Refills: 0 | Status: SHIPPED | OUTPATIENT
Start: 2024-11-06

## 2024-11-06 RX ORDER — PHENOBARBITAL 15 MG/1
15 TABLET ORAL 2 TIMES DAILY
Qty: 6 TABLET | Refills: 0 | Status: SHIPPED | OUTPATIENT
Start: 2024-11-06 | End: 2024-11-12

## 2024-11-06 RX ORDER — AMLODIPINE BESYLATE 5 MG/1
5 TABLET ORAL DAILY
Qty: 30 TABLET | Refills: 0 | Status: SHIPPED | OUTPATIENT
Start: 2024-11-07

## 2024-11-06 RX ADMIN — ASPIRIN 81 MG: 81 TABLET, COATED ORAL at 08:44

## 2024-11-06 RX ADMIN — FOLIC ACID TAB 400 MCG 800 MCG: 400 TAB at 08:44

## 2024-11-06 RX ADMIN — CARVEDILOL 12.5 MG: 12.5 TABLET, FILM COATED ORAL at 08:44

## 2024-11-06 RX ADMIN — AMLODIPINE BESYLATE 5 MG: 5 TABLET ORAL at 08:44

## 2024-11-06 RX ADMIN — THIAMINE HCL TAB 100 MG 100 MG: 100 TAB at 08:44

## 2024-11-06 RX ADMIN — HYDROCHLOROTHIAZIDE 12.5 MG: 12.5 TABLET ORAL at 08:44

## 2024-11-06 RX ADMIN — PHENOBARBITAL SODIUM 30 MG: 65 INJECTION INTRAMUSCULAR at 08:44

## 2024-11-06 RX ADMIN — CLOPIDOGREL BISULFATE 75 MG: 75 TABLET, FILM COATED ORAL at 08:44

## 2024-11-06 RX ADMIN — HEPARIN SODIUM 5000 UNITS: 5000 INJECTION INTRAVENOUS; SUBCUTANEOUS at 05:34

## 2024-11-06 RX ADMIN — NICOTINE 14 MG: 14 PATCH, EXTENDED RELEASE TRANSDERMAL at 08:49

## 2024-11-06 RX ADMIN — CHLORHEXIDINE GLUCONATE 0.12% ORAL RINSE 15 ML: 1.2 LIQUID ORAL at 08:44

## 2024-11-06 NOTE — RESTORATIVE TECHNICIAN NOTE
Restorative Technician Note      Patient Name: Vlad Gregorio     Restorative Tech Visit Date: 11/06/24  Note Type: Mobility  Patient Position Upon Consult: Supine  Activity Performed: Transferred  Assistive Device: Other (Comment) (HHA x 1)  Patient Position at End of Consult: Bed/Chair alarm activated; All needs within reach; Bedside chair

## 2024-11-06 NOTE — CASE MANAGEMENT
Case Management Discharge Planning Note    Patient name Vlad Gregorio  Location [facility] MRN [id number]  : 1960 Date 2024       Current Admission Date: 10/29/2024  Current Admission Diagnosis:Penetrating atherosclerotic ulcer of aorta (HCC)   Patient Active Problem List    Diagnosis Date Noted Date Diagnosed    CVA (cerebral vascular accident) (HCC) 2024     Right leg weakness 10/29/2024     Alcohol abuse 10/29/2024     Penetrating atherosclerotic ulcer of aorta (HCC) 10/29/2024     Syncope and collapse 2018     Tobacco abuse 2018       LOS (days): 8  Geometric Mean LOS (GMLOS) (days): 3.1  Days to GMLOS:-4.6     OBJECTIVE:  Risk of Unplanned Readmission Score: 10.61         Current admission status: Inpatient   Preferred Pharmacy:   UNKNOWN - FOLLOW UP PRIOR TO DISCHARGE TO E-PRESCRIBE  No address on file      [facility] - [place], [place] - [address]  [address]  [place] [address]  Phone: [phone] Fax: [phone]    Primary Care Provider: Arthur Watts MD    Primary Insurance: AETNA  Secondary Insurance:     DISCHARGE DETAILS:     Spoke with Dr Bonds, pt stable for d/c today.   Pt approved for [facility].  Spoke with Pam from [facility] via [facility], they are able to accept for admission today.    set up 2pm stretcher van transport via [employer].   Met with the pt and his sister at bedside to update them on the pt's transfer to [facility] today and transport time.   Pt's sister is contacting the pt's daughter to update her.   Updated the pt's RN Arelis with transport time and RN report number for [facility].   Pt's Med Necc and facesheet on envelope at  desk.

## 2024-11-06 NOTE — TELEPHONE ENCOUNTER
STILL ADMITTED:10/29/2024 - present (8 days)  [facility]      [facility] Discharge  Vlad Gregorio will need follow-up in  6 weeks  with neurovascular team for Other in 60 minute appointment. They will not require outpatient neurological testing.

## 2024-11-06 NOTE — PHYSICAL THERAPY NOTE
PHYSICAL THERAPY NOTE          Patient Name: Vlad Gregorio  Today's Date: 11/6/2024 11/06/24 1108   PT Last Visit   PT Visit Date 11/06/24   Note Type   Note Type Treatment   Pain Assessment   Pain Assessment Tool 0-10   Pain Score No Pain   Restrictions/Precautions   Other Precautions Cardiac/sternal;Telemetry;Fall Risk;Cognitive;Chair Alarm   General   Chart Reviewed Yes   Additional Pertinent History cleared for Tx session by nsg   Response to Previous Treatment Patient with no complaints from previous session.   Cognition   Overall Cognitive Status Impaired   Arousal/Participation Arousable;Cooperative   Attention Attends with cues to redirect   Orientation Level Oriented to person;Oriented to place;Oriented to situation   Memory Decreased short term memory;Decreased recall of recent events;Decreased recall of precautions   Following Commands Follows one step commands without difficulty   Subjective   Subjective Pt is resting in bed; arousable; agreeable to mobilize   Bed Mobility   Supine to Sit 4  Minimal assistance   Additional items Assist x 1;Increased time required;Verbal cues   Transfers   Sit to Stand 4  Minimal assistance   Additional items Assist x 1;Verbal cues   Stand to Sit 4  Minimal assistance   Additional items Assist x 1;Verbal cues   Ambulation/Elevation   Gait pattern Excessively slow;Short stride;Foward flexed;Inconsistent jessa   Gait Assistance 4  Minimal assist   Additional items Assist x 1;Verbal cues;Tactile cues   Assistive Device Rolling walker   Distance 100 ft + 120 ft + 70 ft w/ extended seated rest periods in between   Balance   Static Sitting Fair -   Dynamic Sitting Fair -   Static Standing Poor +   Dynamic Standing Poor +   Ambulatory Poor +   Activity Tolerance   Activity Tolerance Patient tolerated treatment well   Medical Staff Made Aware Co-Tx for mobility performed w/ OTR due to  complexity of medical status   Nurse Made Aware spoke to LAUREL Ryan   Exercises   Knee AROM Long Arc Quad Sitting;10 reps;AROM;Bilateral   Ankle Pumps Sitting;10 reps;AROM;Bilateral   Marching Sitting;10 reps;AROM;Bilateral  (2 sets)   Neuro re-ed LAQ --> Abd-Add and to floor; (B) LE 2 x 10 reps AROM   Balance training  Standing balance/tolerance training x 3 min incl during lower body dressing and AM care w/ staff   Assessment   Prognosis Good   Problem List Decreased strength;Decreased endurance;Impaired balance;Decreased mobility;Decreased cognition;Decreased safety awareness   Assessment Pt cont improving in functional endurance, balance and mobility skills ambulating further distances w/ rw and min (A)x1; pt still exhibits inconsistent carryover of safety instructions for posture when amb and hands placement w/ transfers requiring ongoing education; D/C recommendations are listed below; will follow   Barriers to Discharge Decreased caregiver support   Goals   Patient Goals to cont getting better   STG Expiration Date 11/17/24   PT Treatment Day 3   Plan   Treatment/Interventions Functional transfer training;LE strengthening/ROM;Elevations;Therapeutic exercise;Endurance training;Cognitive reorientation;Bed mobility;Gait training;Spoke to nursing;OT   Progress Progressing toward goals   PT Frequency 3-5x/wk   Discharge Recommendation   Rehab Resource Intensity Level, PT I (Maximum Resource Intensity)   Equipment Recommended Walker   Walker Package Recommended Wheeled walker   Change/add to Walker Package? No   AM-PAC Basic Mobility Inpatient   Turning in Flat Bed Without Bedrails 4   Lying on Back to Sitting on Edge of Flat Bed Without Bedrails 3   Moving Bed to Chair 3   Standing Up From Chair Using Arms 3   Walk in Room 3   Climb 3-5 Stairs With Railing 3   Basic Mobility Inpatient Raw Score 19   Basic Mobility Standardized Score 42.48   [facility] Highest Level Of Mobility   -[facility] Goal 6: Walk 10 steps or  more   JH-HLM Achieved 8: Walk 250 feet ot more   Education   Education Provided Mobility training;Home exercise program;Assistive device   Patient Demonstrates verbal understanding;Reinforcement needed   End of Consult   Patient Position at End of Consult Bedside chair;Bed/Chair alarm activated;All needs within reach     Jono Christianson

## 2024-11-06 NOTE — PROGRESS NOTES
"Progress Note - Hospitalist   Name: Vlad Gregorio 64 y.o. male I MRN: [id number]  Unit/Bed#: [facility]-01 I Date of Admission: 10/29/2024   Date of Service: 11/6/2024 I Hospital Day: 8    Assessment & Plan  Penetrating atherosclerotic ulcer of aorta (HCC)  CT concerning for penetrating aortic arch ulcer without evidence of extravasation or dissection  Patient has been evaluated by cardiothoracic surgery and we appreciate their expertise.  No plans for surgical management at this time  Blood pressure currently controlled  Tobacco abuse  Nicotine patch  Alcohol abuse  On admission patient reported drinking a case of beer over several days with a course of a week.  Earlier in stay he began going through withdrawals and has since admitted that he drinks about half a case of beer a day  Continue thiamine and folate  CVA (cerebral vascular accident) (HCC)  Patient presented to the ED for lower extremity weakness, CT of the head showing \"acute infarcts involving the right corona radiata/gangliocapsular region, left gangliocapsular region and right parietal lobe\"   MRI revealed \"Acute infarcts involving the right corona radiata/gangliocapsular region, left gangliocapsular region and right parietal lobe. Mild mass effect without midline shift\"  ELICEO ruled out thromboembolic phenomenon  Continue aspirin, Plavix, statin  Will need rehab on discharge    VTE Pharmacologic Prophylaxis:   Moderate Risk (Score 3-4) - Pharmacological DVT Prophylaxis Ordered: heparin.    Mobility:   Basic Mobility Inpatient Raw Score: 18  JH-HLM Goal: 6: Walk 10 steps or more  JH-HLM Achieved: 7: Walk 25 feet or more  JH-HLM Goal achieved. Continue to encourage appropriate mobility.    Patient Centered Rounds: I performed bedside rounds with nursing staff today.   Discussions with Specialists or Other Care Team Provider: cm, nursing    Education and Discussions with Family / Patient:  pt, daughter.     Current Length of Stay: 8 day(s)  Current Patient " Status: Inpatient   Certification Statement: The patient will continue to require additional inpatient hospital stay due to see below  Discharge Plan:  Anticipate medically clear later today.  Will need rehab    Code Status: Level 1 - Full Code    Subjective   Denies chest pain, shortness of breath, cough, fevers, chills    Objective :  Temp:  [98.1 °F (36.7 °C)-98.6 °F (37 °C)] 98.6 °F (37 °C)  HR:  [60-73] 64  BP: (110-135)/(55-72) 126/72  Resp:  [17-18] 18  SpO2:  [91 %-96 %] 94 %  O2 Device: None (Room air)    Body mass index is 31.67 kg/m².     Input and Output Summary (last 24 hours):     Intake/Output Summary (Last 24 hours) at 11/6/2024 1047  Last data filed at 11/6/2024 0700  Gross per 24 hour   Intake 240 ml   Output 200 ml   Net 40 ml       Physical Exam  Constitutional:       General: He is not in acute distress.     Appearance: He is well-developed. He is not diaphoretic.   HENT:      Head: Normocephalic and atraumatic.      Nose: Nose normal.      Mouth/Throat:      Pharynx: No oropharyngeal exudate.   Eyes:      General: No scleral icterus.     Conjunctiva/sclera: Conjunctivae normal.   Cardiovascular:      Rate and Rhythm: Normal rate and regular rhythm.      Heart sounds: Normal heart sounds. No murmur heard.     No friction rub. No gallop.   Pulmonary:      Effort: Pulmonary effort is normal. No respiratory distress.      Breath sounds: Normal breath sounds. No wheezing or rales.   Chest:      Chest wall: No tenderness.   Abdominal:      General: Bowel sounds are normal. There is no distension.      Palpations: Abdomen is soft.      Tenderness: There is no abdominal tenderness. There is no guarding.   Musculoskeletal:         General: No tenderness or deformity. Normal range of motion.      Cervical back: Normal range of motion and neck supple.   Skin:     General: Skin is warm and dry.      Findings: No erythema.   Neurological:      Mental Status: He is alert and oriented to person, place, and  time. Mental status is at baseline.           Lines/Drains:        Telemetry:  Telemetry Orders (From admission, onward)               24 Hour Telemetry Monitoring  Continuous x 24 Hours (Telem)        Expiring   Question:  Reason for 24 Hour Telemetry  Answer:  TIA/Suspected CVA/ Confirmed CVA                     Telemetry Reviewed: Normal Sinus Rhythm  Indication for Continued Telemetry Use: Acute CVA               Lab Results: I have reviewed the following results:   Results from last 7 days   Lab Units 11/06/24  0442   WBC Thousand/uL 5.32   HEMOGLOBIN g/dL 12.5   HEMATOCRIT % 36.1*   PLATELETS Thousands/uL 192   SEGS PCT % 71   LYMPHO PCT % 12*   MONO PCT % 14*   EOS PCT % 1     Results from last 7 days   Lab Units 11/06/24  0442 11/01/24  0511 10/31/24  0604   SODIUM mmol/L 130*   < > 135   POTASSIUM mmol/L 4.1   < > 4.0   CHLORIDE mmol/L 96   < > 103   CO2 mmol/L 27   < > 25   BUN mg/dL 20   < > 14   CREATININE mg/dL 0.98   < > 0.94   ANION GAP mmol/L 7   < > 7   CALCIUM mg/dL 8.4   < > 8.4   ALBUMIN g/dL  --   --  3.8   TOTAL BILIRUBIN mg/dL  --   --  1.05*   ALK PHOS U/L  --   --  50   ALT U/L  --   --  14   AST U/L  --   --  19   GLUCOSE RANDOM mg/dL 107   < > 96    < > = values in this interval not displayed.                 Results from last 7 days   Lab Units 11/01/24  0642   LACTIC ACID mmol/L 0.6       Recent Cultures (last 7 days):         Imaging Results Review: I personally reviewed the following image studies/reports in PACS and discussed pertinent findings with Radiology: chest xray. My interpretation of the radiology images/reports is: na.  Other Study Results Review: EKG was reviewed.     Last 24 Hours Medication List:     Current Facility-Administered Medications:     amLODIPine (NORVASC) tablet 5 mg, Daily    aspirin (ECOTRIN LOW STRENGTH) EC tablet 81 mg, Daily    atorvastatin (LIPITOR) tablet 80 mg, Daily With Dinner    carvedilol (COREG) tablet 12.5 mg, BID With Meals    chlorhexidine  (PERIDEX) 0.12 % oral rinse 15 mL, Q12H SHEA    clopidogrel (PLAVIX) tablet 75 mg, Daily    folic acid (FOLVITE) tablet 800 mcg, Daily    heparin (porcine) subcutaneous injection 5,000 Units, Q8H SHEA    hydrALAZINE (APRESOLINE) injection 5 mg, Q4H PRN    hydroCHLOROthiazide tablet 12.5 mg, Daily    labetalol (NORMODYNE) injection 10 mg, Q4H PRN    nicotine (NICODERM CQ) 14 mg/24hr TD 24 hr patch 14 mg, Daily    [COMPLETED] PHENobarbital injection 90 mg, TID **FOLLOWED BY** [COMPLETED] PHENobarbital injection 60 mg, TID **FOLLOWED BY** PHENobarbital injection 30 mg, TID **FOLLOWED BY** [START ON 11/7/2024] PHENobarbital injection 15 mg, TID    thiamine tablet 100 mg, Daily    Administrative Statements   Today, Patient Was Seen By: Thomas Bonds MD  I have spent a total time of 30 minutes in caring for this patient on the day of the visit/encounter including Diagnostic results.    **Please Note: This note may have been constructed using a voice recognition system.**

## 2024-11-06 NOTE — PLAN OF CARE
Problem: OCCUPATIONAL THERAPY ADULT  Goal: Performs self-care activities at highest level of function for planned discharge setting.  See evaluation for individualized goals.  Description: Treatment Interventions: ADL retraining, Functional transfer training, UE strengthening/ROM, Endurance training, Cognitive reorientation, Patient/family training, Equipment evaluation/education, Fine motor coordination activities, Compensatory technique education, Energy conservation, Activityengagement, Neuromuscular reeducation          See flowsheet documentation for full assessment, interventions and recommendations.   Outcome: Progressing  Note: Limitation: Decreased ADL status, Decreased UE strength, Decreased Safe judgement during ADL, Decreased cognition, Decreased endurance, Decreased fine motor control, Decreased high-level ADLs  Prognosis: Fair  Assessment: Pt seen for OT treatment session day 2 on this date focused on ADL retraining, functional transfers and mobility, energy conservation, functional endurance. Pt was greeted in bed and was cooperative throughout session. Following session, pt was left in chair with chair alarm on and all needs within reach, PT present. Pt continues to be limited by functional status related to ADLs and transfers requiring MOD A for LB dressing and bathing, MIN A x 1 with RW for functional transfers and mobility. The patient's raw score on the -PAC Daily Activity Inpatient Short Form is 16. A raw score of less than 19 suggests the patient may benefit from discharge to post-acute rehabilitation services. Please refer to the recommendation of the Occupational Therapist for safe discharge planning. Pt would benefit from continued acute OT intervention with plan to continue OT treatment sessions 2-3x per week. Recommend d/c to level I services.     Rehab Resource Intensity Level, OT: I (Maximum Resource Intensity)

## 2024-11-06 NOTE — PLAN OF CARE
Problem: PHYSICAL THERAPY ADULT  Goal: Performs mobility at highest level of function for planned discharge setting.  See evaluation for individualized goals.  Description: Treatment/Interventions: ADL retraining, Functional transfer training, LE strengthening/ROM, Elevations, Therapeutic exercise, Cognitive reorientation, Bed mobility, Gait training, Spoke to nursing, OT  Equipment Recommended: Walker       See flowsheet documentation for full assessment, interventions and recommendations.  Outcome: Progressing  Note: Prognosis: Good  Problem List: Decreased strength, Decreased endurance, Impaired balance, Decreased mobility, Decreased cognition, Decreased safety awareness  Assessment: Pt cont improving in functional endurance, balance and mobility skills ambulating further distances w/ rw and min (A)x1; pt still exhibits inconsistent carryover of safety instructions for posture when amb and hands placement w/ transfers requiring ongoing education; D/C recommendations are listed below; will follow  Barriers to Discharge: Decreased caregiver support     Rehab Resource Intensity Level, PT: I (Maximum Resource Intensity)    See flowsheet documentation for full assessment.

## 2024-11-06 NOTE — OCCUPATIONAL THERAPY NOTE
Occupational Therapy Progress Note     Patient Name: Vlad Gregorio  Today's Date: 11/6/2024  Problem List  Principal Problem:    Penetrating atherosclerotic ulcer of aorta (HCC)  Active Problems:    Tobacco abuse    Alcohol abuse    CVA (cerebral vascular accident) ([facility])          11/06/24 1054   OT Last Visit   OT Visit Date 11/06/24   Note Type   Note Type Treatment   Pain Assessment   Pain Assessment Tool 0-10   Pain Score No Pain   Restrictions/Precautions   Other Precautions Cognitive;Bed Alarm;Chair Alarm;Telemetry;Fall Risk   Lifestyle   Autonomy Pt reports being IND w/ all ADLS and IADLS; (+) drives; ambulates w/o AD PTA   Reciprocal Relationships Pt reports residing alone and has local children that can A PRN.   Service to Others Pt works FT as a    Intrinsic Gratification None stated   ADL   Where Assessed Edge of bed   LB Bathing Assistance 3  Moderate Assistance   LB Bathing Deficit Setup;Steadying;Supervision/safety;Verbal cueing;Buttocks;Right lower leg including foot;Left lower leg including foot   LB Bathing Comments Pt washes ant. thighs and shins although requiring assist to wash buttocks/post. thigh and calves.   LB Dressing Assistance 3  Moderate Assistance   LB Dressing Deficit Thread RLE into pants;Thread LLE into pants;Setup;Steadying;Verbal cueing   LB Dressing Comments Pt assists pulling pants up and over hips, assist to initially thread BLE through pants and pull over hips.   Bed Mobility   Supine to Sit 4  Minimal assistance   Additional items Assist x 1;Increased time required;Verbal cues   Additional Comments Pt greeted in bed, left in chair with alarm on and all needs within reach.   Transfers   Sit to Stand 4  Minimal assistance   Additional items Assist x 1;Verbal cues   Stand to Sit 4  Minimal assistance   Additional items Assist x 1;Verbal cues   Additional Comments with R, STS x 5   Functional Mobility   Functional Mobility 4  Minimal assistance   Additional  Comments Pt performs 3 trials of short household distances with MIN A x 1 with RW and chair follow requiring 2 seated rest breaks, vc for safe use of RW and navigation of hallway.   Additional items Rolling walker   Cognition   Overall Cognitive Status Impaired   Arousal/Participation Cooperative;Alert   Attention Attends with cues to redirect   Orientation Level Oriented to person;Oriented to place;Oriented to situation   Memory Decreased short term memory;Decreased recall of recent events;Decreased recall of precautions   Following Commands Follows one step commands without difficulty   Comments Pt cooperative to therapy, flat throughout session, requiring frequent vc for safety throughout ADL and FM.   Activity Tolerance   Activity Tolerance Patient limited by fatigue   Medical Staff Made Aware RN cleared for therapy, co-tx with DPT due to medical complexity.   Assessment   Assessment Pt seen for OT treatment session day 2 on this date focused on ADL retraining, functional transfers and mobility, energy conservation, functional endurance. Pt was greeted in bed and was cooperative throughout session. Following session, pt was left in chair with chair alarm on and all needs within reach, PT present. Pt continues to be limited by functional status related to ADLs and transfers requiring MOD A for LB dressing and bathing, MIN A x 1 with RW for functional transfers and mobility. The patient's raw score on the AM-PAC Daily Activity Inpatient Short Form is 16. A raw score of less than 19 suggests the patient may benefit from discharge to post-acute rehabilitation services. Please refer to the recommendation of the Occupational Therapist for safe discharge planning. Pt would benefit from continued acute OT intervention with plan to continue OT treatment sessions 2-3x per week. Recommend d/c to level I services.   Plan   Treatment Interventions ADL retraining;Functional transfer training;Endurance training;Continued  evaluation;Energy conservation   Goal Expiration Date 11/17/24   OT Treatment Day 2   OT Frequency 2-3x/wk   Discharge Recommendation   Rehab Resource Intensity Level, OT I (Maximum Resource Intensity)   AM-PAC Daily Activity Inpatient   Lower Body Dressing 2   Bathing 2   Toileting 2   Upper Body Dressing 3   Grooming 3   Eating 4   Daily Activity Raw Score 16   Daily Activity Standardized Score (Calc for Raw Score >=11) 35.96   AM-PAC Applied Cognition Inpatient   Following a Speech/Presentation 3   Understanding Ordinary Conversation 4   Taking Medications 3   Remembering Where Things Are Placed or Put Away 2   Remembering List of 4-5 Errands 2   Taking Care of Complicated Tasks 2   Applied Cognition Raw Score 16   Applied Cognition Standardized Score 35.03   Barthel Index   Feeding 5   Bathing 0   Grooming Score 5   Dressing Score 5   Bladder Score 5   Bowels Score 5   Toilet Use Score 5   Transfers (Bed/Chair) Score 5   Mobility (Level Surface) Score 0   Stairs Score 0   Barthel Index Score 35   Modified [place] Scale   Modified Basia Scale 4   End of Consult   Education Provided Yes   Patient Position at End of Consult Bedside chair;Bed/Chair alarm activated;All needs within reach   Nurse Communication Nurse aware of consult         ROSALVA Alvarez, OTR/L

## 2024-11-07 NOTE — TELEPHONE ENCOUNTER
Kae called from [facility] rehab unit. She stated that the patient just arrived at the rehab facility and she estimated the patient being there for 1-2 weeks depending on recovery.     She said if you have any further questions to please call her at phone # [phone]  For Scheduling F/U appt for hospital encounter on 10/29/24.     ESTRELLA

## 2024-11-08 NOTE — UTILIZATION REVIEW
NOTIFICATION OF ADMISSION DISCHARGE   This is a Notification of Discharge from [facility]. Please be advised that this patient has been discharge from our facility. Below you will find the admission and discharge date and time including the patient’s disposition.   UTILIZATION REVIEW CONTACT:  Carol Watkins  Utilization   Network Utilization Review Department  Phone: [phone] x carefully listen to the prompts. All voicemails are confidential.  Email: [email or web address]     ADMISSION INFORMATION  PRESENTATION DATE: 10/29/2024  7:34 PM  OBERVATION ADMISSION DATE: N/A  INPATIENT ADMISSION DATE: 10/29/24  7:34 PM   DISCHARGE DATE: 11/6/2024  3:52 PM   DISPOSITION:Non [facility] Acute Rehab    Network Utilization Review Department  ATTENTION: Please call with any questions or concerns to [phone] and carefully listen to the prompts so that you are directed to the right person. All voicemails are confidential.   For Discharge needs, contact Care Management DC Support Team at [phone] opt. 2  Send all requests for admission clinical reviews, approved or denied determinations and any other requests to dedicated fax number below belonging to the campus where the patient is receiving treatment. List of dedicated fax numbers for the Facilities:  FACILITY NAME UR FAX NUMBER   ADMISSION DENIALS (Administrative/Medical Necessity) [phone]   DISCHARGE SUPPORT TEAM ([facility]) [phone]   PARENT CHILD HEALTH (Maternity/NICU/Pediatrics) [phone]   [facility] [phone]   [facility] [phone]   [facility] [phone]   [facility] [phone]   [facility] [phone]   [facility] [phone]   [facility] [phone]   [facility]  [phone]   [facility] [phone]   [facility] [phone]   [facility] [phone]   [facility] [phone]

## 2024-11-11 NOTE — DISCHARGE SUMMARY
"Discharge Summary - Hospitalist   Name: Vlad Gregorio 64 y.o. male I MRN: [id number]  Unit/Bed#: PPHP-317-01 I Date of Admission: 10/29/2024   Date of Service: 11/11/2024 I Hospital Day: 8     Assessment & Plan  Penetrating atherosclerotic ulcer of aorta (HCC)  CT concerning for penetrating aortic arch ulcer without evidence of extravasation or dissection  Patient has been evaluated by cardiothoracic surgery and we appreciate their expertise.  No plans for surgical management at this time  Blood pressure currently controlled  Tobacco abuse  Nicotine patch  Alcohol abuse  On admission patient reported drinking a case of beer over several days with a course of a week.  Earlier in stay he began going through withdrawals and has since admitted that he drinks about half a case of beer a day  Continue thiamine and folate  CVA (cerebral vascular accident) (HCC)  Patient presented to the ED for lower extremity weakness, CT of the head showing \"acute infarcts involving the right corona radiata/gangliocapsular region, left gangliocapsular region and right parietal lobe\"   MRI revealed \"Acute infarcts involving the right corona radiata/gangliocapsular region, left gangliocapsular region and right parietal lobe. Mild mass effect without midline shift\"  ELICEO ruled out thromboembolic phenomenon  Continue aspirin, Plavix, statin  Will need rehab on discharge   Discharging Physician / Practitioner: Thomas Bonds MD  PCP: Arthur Watts MD  Admission Date:   Admission Orders (From admission, onward)       Ordered        10/29/24 1943  Inpatient Admission  Once                          Discharge Date: 11/6/24    Medical Problems       Resolved Problems  Date Reviewed: 11/6/2024            Resolved    ELZA (acute kidney injury) ([facility]) 11/1/2024     Resolved by  ANDREI Mckeon          Consultations During Hospital Stay:  neurology    Procedures Performed:   ELICEO    Significant Findings / Test Results:   MRI brain " wo contrast    Result Date: 11/1/2024  Impression: Acute infarcts involving the right corona radiata/gangliocapsular region, left gangliocapsular region and right parietal lobe. Mild mass effect without midline shift. Moderate-marked chronic microangiopathy. I personally discussed this study with BRITTANIEPAOLA CLEMONS on 11/1/2024 3:20 PM. Workstation performed: [id number]     CTA dissection protocol chest/abdomen/pelvis    Result Date: 10/29/2024  Impression: Again noted along the undersurface of the posterior aortic arch is a contour abnormality in the wall as seen on images 605/93 and 606/103 with surrounding fluid most consistent with penetrating ulcer with focal contained rupture. No active extravasation on the current exam and no worsening since the study from 2 hours prior. No aneurysm or dissection. 7 mm right apical nodule. No prior studies. Based on current Fleischner Society 2017 Guidelines on incidental pulmonary nodule, follow-up non-contrast CT is recommended at 6-12 months from the initial examination and, if stable at that time, an additional follow-up is recommended for 18-24 months from the initial examination. Findings consistent with chronic bronchitis. Soft tissue stranding surrounding the left adrenal gland and left ureter. This could be posttraumatic versus infection/inflammation. Does the patient have a history of recent trauma? Workstation performed: [id number]     CT spine thoracic and lumbar wo contrast    Addendum Date: 10/29/2024    ADDENDUM: I personally provided preliminary results of this study with SERGIO DRAPER on 10/29/2024 3:45 PM.     Result Date: 10/29/2024  Impression: 1. Grade 1 anterolisthesis of L5 on S1 secondary to chronic appearing bilateral spondylolysis. 2. Mild scattered multilevel spondylosis without acute fracture. 3. Semisolid right upper lobe nodule versus tree-in-bud nodularity. Surveillance imaging with repeat CT of the chest in 3 months is recommended to assess for  resolution. Underlying malignancy is the diagnosis of exclusion. 4. Small amount of secretions in the right lower lobe bronchus indicative of aspiration. 5. Thickening of the adrenal glands bilaterally possibly on the basis of adenomatous hyperplasia. Further clinical assessment advised. 6. Nonspecific perinephric and periureteral strandy densities on the left without hydronephrosis. Recently passed calculus or nonvisualized distal ureteral calculus in the differential diagnosis as well as left-sided pyelonephritis. Correlation with urinalysis and clinical and laboratory values advised. Workstation performed: [id number]     X-ray chest 1 view portable    Result Date: 10/29/2024  Impression: Findings suggestive of interstitial pulmonary edema. Resident: EMBER River I, the attending radiologist, have reviewed the images and agree with the final report above. Workstation performed: [id number]     CTA stroke alert (head/neck)    Result Date: 10/29/2024  Impression: Irregularity/outpouching along the undersurface of the aortic arch, just distal to the origin of the left subclavian artery, with eccentric adjacent soft tissue density and stranding, extending anteriorly and medially, adjacent to the outpouching in the mediastinum, and eccentric soft tissue density extending along the proximal left subclavian artery, and additional stranding extending into the anterior mediastinum. No visible dissection flap is identified. Given the appearance, this is most concerning for a penetrating atherosclerotic ulcer, with associated rupture given the adjacent mediastinal soft tissue density/hematoma. Recommend dedicated CT angiogram of the aorta with contrast. No large vessel occlusion, high-grade stenosis, or intracranial aneurysm identified on CT angiogram of the head. Approximately 40 to 50% stenosis of the right cervical internal carotid artery due to atherosclerotic plaque at the right carotid bulb/bifurcation. No stenosis  of the left cervical internal carotid artery. No stenosis of the bilateral cervical vertebral arteries. Multiple pulmonary nodules involving the visualized right lung, including 9 mm right lung apical pulmonary nodule, with adjacent 8 mm irregular pulmonary nodule in the right lung apex and a 6 mm posterior right upper lobe pulmonary nodule. Using 2017 Fleischner guidelines, recommend follow-up low-dose noncontrast chest CT in 3 months time to document stability. Findings regarding the CT angiogram were directly discussed with Terrance Bill at 3:00 p.m. on 10/29/2024.. Findings were also discussed with Dr. Hyde, on 10/29/2024 at 3:37 p.m. including the pulmonary nodules. Workstation performed: [id number]     CT stroke alert brain    Result Date: 10/29/2024  Impression: New hypodense areas involving the bilateral basal ganglia, which may be related to prior infarcts, or worsening chronic microangiopathic changes, but more recent infarcts in these regions cannot be excluded. Recommend further evaluation with brain MRI without contrast. Encephalomalacia involving the right occipital lobe related to prior infarct. Old thalamic lacunar infarct again demonstrated. Mild to moderate chronic microangiopathic white matter changes. Findings were directly discussed with Terrance Bill at approximately 2:55 p.m on 10/29/2024. Workstation performed: [id number]      Incidental Findings:   none     Test Results Pending at Discharge (will require follow up):   none     Outpatient Tests Requested:  none    Complications:  none    Reason for Admission: CVA    Hospital Course:     Vlad Gregorio is a 64 y.o. male patient who originally presented to the hospital on 10/29/2024 with past medical history significant alcohol abuse, tobacco abuse who initially presented with penetrating atherosclerotic ulcer of aorta.  Evaluated by CT surgery recommended conservative therapy.  No plans for surgical management.  Also noted to have CVA with acute  infarcts suspected thromboembolic phenomenon.  Underwent ELICEO without significant findings.  Ultimately discharged on aspirin, Plavix.  And discharged to rehab facility.  Will have outpatient loop recorder placed    Please see above list of diagnoses and related plan for additional information.     Condition at Discharge: stable     Discharge Day Visit / Exam:     * Please refer to separate progress note for these details *    Discussion with Family: pt, daughter    Discharge instructions/Information to patient and family:   See after visit summary for information provided to patient and family.      Provisions for Follow-Up Care:  See after visit summary for information related to follow-up care and any pertinent home health orders.      Disposition:     Acute Rehab at      For Discharges to [facility]:   Not Applicable to this Patient - Not Applicable to this Patient    Planned Readmission: none     Discharge Statement:  I spent 60 minutes discharging the patient. This time was spent on the day of discharge. I had direct contact with the patient on the day of discharge. Greater than 50% of the total time was spent examining patient, answering all patient questions, arranging and discussing plan of care with patient as well as directly providing post-discharge instructions.  Additional time then spent on discharge activities.    Discharge Medications:  See after visit summary for reconciled discharge medications provided to patient and family.      ** Please Note: This note has been constructed using a voice recognition system **

## 2024-11-12 ENCOUNTER — OFFICE VISIT (OUTPATIENT)
Dept: CARDIOLOGY CLINIC | Facility: CLINIC | Age: 64
End: 2024-11-12
Payer: COMMERCIAL

## 2024-11-12 VITALS
HEIGHT: 66 IN | SYSTOLIC BLOOD PRESSURE: 124 MMHG | DIASTOLIC BLOOD PRESSURE: 66 MMHG | BODY MASS INDEX: 31.67 KG/M2 | OXYGEN SATURATION: 97 % | HEART RATE: 65 BPM

## 2024-11-12 DIAGNOSIS — I10 PRIMARY HYPERTENSION: ICD-10-CM

## 2024-11-12 DIAGNOSIS — I71.9 PENETRATING ATHEROSCLEROTIC ULCER OF AORTA (HCC): Primary | ICD-10-CM

## 2024-11-12 DIAGNOSIS — E78.2 MIXED HYPERLIPIDEMIA: ICD-10-CM

## 2024-11-12 DIAGNOSIS — I63.9 CEREBROVASCULAR ACCIDENT (CVA), UNSPECIFIED MECHANISM (HCC): ICD-10-CM

## 2024-11-12 PROCEDURE — 99214 OFFICE O/P EST MOD 30 MIN: CPT | Performed by: INTERNAL MEDICINE

## 2024-11-12 NOTE — PROGRESS NOTES
Subjective:        Patient ID: Vlad Gregorio is a 64 y.o. male.    Chief Complaint:  The patient presented to this office for the purpose of cardiac follow-up having been hospitalized at [facility] because of stroke.  The patient was at work and he started shuffling while he is walking and was subsequently sent to the emergency room for further of follow-up and management.  The patient had extensive neurologic and cardiac workup showing evidence of a stroke his symptoms of leg weakness improved gradually and he is able to walk fine at this point.  He did not have any speech disorder or any weakness in the arms.  The patient is known to be a smoker and he drinks alcohol.  He is known to have COPD.  He is also known to have coronary calcification.  At this point the patient denies any symptom of chest pain or shortness of breath.  He does feel some wheezing.  He denies any symptoms of palpitation, dizziness or syncope.  He has no leg edema.      The following portions of the patient's history were reviewed and updated as appropriate: allergies, current medications, past family history, past medical history, past social history, past surgical history, and problem list.  Review of Systems   Constitutional: Negative.   Cardiovascular: Negative.    Respiratory:  Positive for cough and wheezing.    Psychiatric/Behavioral: Negative.            Objective:     Physical Exam  Vitals reviewed.   Constitutional:       Appearance: Normal appearance.   HENT:      Head: Normocephalic and atraumatic.   Cardiovascular:      Rate and Rhythm: Normal rate and regular rhythm.      Heart sounds: Murmur heard.      Systolic murmur is present with a grade of 1/6.   Pulmonary:      Effort: Pulmonary effort is normal.      Breath sounds: Normal breath sounds.   Musculoskeletal:      Right lower leg: No edema.      Left lower leg: No edema.   Skin:     General: Skin is warm and dry.   Neurological:      Mental Status: He is alert and  oriented to person, place, and time.   Psychiatric:         Mood and Affect: Mood normal.         Behavior: Behavior normal.         Lab Review:   No results displayed because visit has over 200 results.      Admission on 10/29/2024, Discharged on 10/29/2024   Component Date Value    WBC 10/29/2024 5.12     RBC 10/29/2024 4.84     Hemoglobin 10/29/2024 14.1     Hematocrit 10/29/2024 41.2     MCV 10/29/2024 85     MCH 10/29/2024 29.1     MCHC 10/29/2024 34.2     RDW 10/29/2024 13.0     Platelets 10/29/2024 207     MPV 10/29/2024 9.7     Protime 10/29/2024 15.7 (H)     INR 10/29/2024 1.20 (H)     PTT 10/29/2024 26     hs TnI 0hr 10/29/2024 11     Sodium 10/29/2024 136     Potassium 10/29/2024 4.1     Chloride 10/29/2024 102     CO2 10/29/2024 24     ANION GAP 10/29/2024 10     BUN 10/29/2024 18     Creatinine 10/29/2024 1.59 (H)     Glucose 10/29/2024 130     Calcium 10/29/2024 9.1     AST 10/29/2024 27     ALT 10/29/2024 21     Alkaline Phosphatase 10/29/2024 52     Total Protein 10/29/2024 7.3     Albumin 10/29/2024 4.3     Total Bilirubin 10/29/2024 1.17 (H)     eGFR 10/29/2024 45     Ventricular Rate 10/29/2024 77     Atrial Rate 10/29/2024 77     SC Interval 10/29/2024 212     QRSD Interval 10/29/2024 90     QT Interval 10/29/2024 384     QTC Interval 10/29/2024 434     P Axis 10/29/2024 70     QRS [place] 10/29/2024 62     T Wave [place] 10/29/2024 247     POC Glucose 10/29/2024 132     hs TnI 2hr 10/29/2024 12     Delta 2hr hsTnI 10/29/2024 1     hs TnI 4hr 10/29/2024 10     Delta 4hr hsTnI 10/29/2024 -1          Assessment:       1. Penetrating atherosclerotic ulcer of aorta (HCC)        2. Cerebrovascular accident (CVA), unspecified mechanism (HCC)        3. Primary hypertension        4. Mixed hyperlipidemia           The patient had cerebrovascular accident with evidence of weakness of the lower extremities that seem to have resolved.  The patient is advised to maintain Plavix and aspirin.    Patient had been  noted to have elevated blood pressure and he is currently on Norvasc which I advised him to continue.    Hyperlipidemia.  The patient will continue Lipitor.  Plan:       The patient is advised to continue present medications.

## 2024-11-15 ENCOUNTER — HOME HEALTH ADMISSION (OUTPATIENT)
Dept: HOME HEALTH SERVICES | Facility: HOME HEALTHCARE | Age: 64
End: 2024-11-15
Payer: COMMERCIAL

## 2024-11-16 ENCOUNTER — HOME CARE VISIT (OUTPATIENT)
Dept: HOME HEALTH SERVICES | Facility: HOME HEALTHCARE | Age: 64
End: 2024-11-16

## 2024-11-17 ENCOUNTER — HOME CARE VISIT (OUTPATIENT)
Dept: HOME HEALTH SERVICES | Facility: HOME HEALTHCARE | Age: 64
End: 2024-11-17
Payer: COMMERCIAL

## 2024-11-17 VITALS
OXYGEN SATURATION: 96 % | RESPIRATION RATE: 16 BRPM | SYSTOLIC BLOOD PRESSURE: 146 MMHG | DIASTOLIC BLOOD PRESSURE: 80 MMHG | TEMPERATURE: 97.7 F | HEART RATE: 66 BPM

## 2024-11-17 PROCEDURE — G0299 HHS/HOSPICE OF RN EA 15 MIN: HCPCS

## 2024-11-17 PROCEDURE — 400013 VN SOC

## 2024-11-17 NOTE — CASE COMMUNICATION
Pt will be at address other than original address  For Sunday 11/17/24 pt will be at son [place]:   [address] [place] [address]    Pt family reports plan to have him move back into his own home within the next week:   [address] [place] [address]    Thank you,  Macy Woodard Rn   [facility]

## 2024-11-18 NOTE — CASE COMMUNICATION
Pt will be at address other than original address   For Sunday 11/17/24 pt will be at son [place]:   [address] [place] [address]   Pt family reports plan to have him move back into his own home starting 11/18/24:   [address] [place] [address]   Thank you,   Macy Woodard Rn   [facility]

## 2024-11-18 NOTE — CASE COMMUNICATION
"ATTN back office: please fax copy of SOC report to PCP Arthur Watts MD (f) [phone]. Thank you!    Medication discrepancies or Major drug interactions: Pt is taking 2mg imodium by mouth once daily prn for diarrhea not previously on med list, added by SN today, please review.   Abnormal clinical findings: A&Ox3. Forgetful, impulsive w decreased attention span. BLLE decreased pulses. Sacrum and BL heels R&B.     This repo rt is informational only, no response is needed  [facility] has Admitted your patient to Home Health service with the following disciplines: SN, PT and OT  Patient stated goals of care: \"to get around my home safely\"  Potential barriers to goal achievement: fall risk, lives alone with limited support, at risk magnolia score, incontinence, comorbidites, >5 medications.  Primary focus of home health care:Neurological  Anticipated visit  pattern and next visit date: 2w2, next anticipated SN visit 11/21/24  Thank you for allowing us to participate in the care of your patient.      Macy Joseph A      "

## 2024-11-19 ENCOUNTER — HOME CARE VISIT (OUTPATIENT)
Dept: HOME HEALTH SERVICES | Facility: HOME HEALTHCARE | Age: 64
End: 2024-11-19
Payer: COMMERCIAL

## 2024-11-20 ENCOUNTER — HOME CARE VISIT (OUTPATIENT)
Dept: HOME HEALTH SERVICES | Facility: HOME HEALTHCARE | Age: 64
End: 2024-11-20
Payer: COMMERCIAL

## 2024-11-20 VITALS — DIASTOLIC BLOOD PRESSURE: 70 MMHG | OXYGEN SATURATION: 98 % | HEART RATE: 78 BPM | SYSTOLIC BLOOD PRESSURE: 128 MMHG

## 2024-11-20 PROCEDURE — G0151 HHCP-SERV OF PT,EA 15 MIN: HCPCS

## 2024-11-21 ENCOUNTER — HOME CARE VISIT (OUTPATIENT)
Dept: HOME HEALTH SERVICES | Facility: HOME HEALTHCARE | Age: 64
End: 2024-11-21
Payer: COMMERCIAL

## 2024-11-21 VITALS
WEIGHT: 190 LBS | BODY MASS INDEX: 30.53 KG/M2 | TEMPERATURE: 97.6 F | OXYGEN SATURATION: 100 % | HEIGHT: 66 IN | HEART RATE: 69 BPM | SYSTOLIC BLOOD PRESSURE: 130 MMHG | RESPIRATION RATE: 18 BRPM | DIASTOLIC BLOOD PRESSURE: 62 MMHG

## 2024-11-21 VITALS — HEART RATE: 71 BPM | DIASTOLIC BLOOD PRESSURE: 58 MMHG | OXYGEN SATURATION: 97 % | SYSTOLIC BLOOD PRESSURE: 108 MMHG

## 2024-11-21 PROCEDURE — G0152 HHCP-SERV OF OT,EA 15 MIN: HCPCS | Performed by: OCCUPATIONAL THERAPIST

## 2024-11-21 PROCEDURE — G0299 HHS/HOSPICE OF RN EA 15 MIN: HCPCS

## 2024-11-22 ENCOUNTER — HOME CARE VISIT (OUTPATIENT)
Dept: HOME HEALTH SERVICES | Facility: HOME HEALTHCARE | Age: 64
End: 2024-11-22
Payer: COMMERCIAL

## 2024-11-22 VITALS
OXYGEN SATURATION: 98 % | RESPIRATION RATE: 15 BRPM | TEMPERATURE: 97.1 F | DIASTOLIC BLOOD PRESSURE: 61 MMHG | SYSTOLIC BLOOD PRESSURE: 118 MMHG | HEART RATE: 70 BPM

## 2024-11-22 PROCEDURE — G0157 HHC PT ASSISTANT EA 15: HCPCS

## 2024-11-26 ENCOUNTER — HOME CARE VISIT (OUTPATIENT)
Dept: HOME HEALTH SERVICES | Facility: HOME HEALTHCARE | Age: 64
End: 2024-11-26
Payer: COMMERCIAL

## 2024-11-26 VITALS
OXYGEN SATURATION: 98 % | RESPIRATION RATE: 16 BRPM | HEART RATE: 67 BPM | TEMPERATURE: 97.8 F | DIASTOLIC BLOOD PRESSURE: 69 MMHG | SYSTOLIC BLOOD PRESSURE: 110 MMHG

## 2024-11-26 PROCEDURE — G0157 HHC PT ASSISTANT EA 15: HCPCS

## 2024-11-29 ENCOUNTER — HOME CARE VISIT (OUTPATIENT)
Dept: HOME HEALTH SERVICES | Facility: HOME HEALTHCARE | Age: 64
End: 2024-11-29
Payer: COMMERCIAL

## 2024-11-29 VITALS
TEMPERATURE: 96.9 F | RESPIRATION RATE: 16 BRPM | DIASTOLIC BLOOD PRESSURE: 90 MMHG | SYSTOLIC BLOOD PRESSURE: 139 MMHG | OXYGEN SATURATION: 97 % | HEART RATE: 74 BPM

## 2024-11-29 PROCEDURE — G0157 HHC PT ASSISTANT EA 15: HCPCS

## 2024-12-03 ENCOUNTER — HOME CARE VISIT (OUTPATIENT)
Dept: HOME HEALTH SERVICES | Facility: HOME HEALTHCARE | Age: 64
End: 2024-12-03
Payer: COMMERCIAL

## 2024-12-03 VITALS — SYSTOLIC BLOOD PRESSURE: 162 MMHG | DIASTOLIC BLOOD PRESSURE: 80 MMHG | HEART RATE: 82 BPM | OXYGEN SATURATION: 98 %

## 2024-12-03 PROCEDURE — G0151 HHCP-SERV OF PT,EA 15 MIN: HCPCS

## 2024-12-06 ENCOUNTER — HOME CARE VISIT (OUTPATIENT)
Dept: HOME HEALTH SERVICES | Facility: HOME HEALTHCARE | Age: 64
End: 2024-12-06
Payer: COMMERCIAL

## 2024-12-06 VITALS — HEART RATE: 66 BPM | SYSTOLIC BLOOD PRESSURE: 144 MMHG | DIASTOLIC BLOOD PRESSURE: 78 MMHG

## 2024-12-06 PROCEDURE — G0151 HHCP-SERV OF PT,EA 15 MIN: HCPCS

## 2025-01-21 ENCOUNTER — TELEPHONE (OUTPATIENT)
Dept: NEUROLOGY | Facility: CLINIC | Age: 65
End: 2025-01-21

## 2025-01-21 NOTE — TELEPHONE ENCOUNTER
Tried calling PT to confirm upcoming appointment on 2/4 at 10am with Dr. Moreno at the [address] [place] [address] location. Left Pt VM with call back number.

## 2025-02-04 ENCOUNTER — OFFICE VISIT (OUTPATIENT)
Dept: NEUROLOGY | Facility: CLINIC | Age: 65
End: 2025-02-04
Payer: COMMERCIAL

## 2025-02-04 VITALS
HEIGHT: 66 IN | OXYGEN SATURATION: 98 % | SYSTOLIC BLOOD PRESSURE: 142 MMHG | HEART RATE: 88 BPM | BODY MASS INDEX: 31.98 KG/M2 | DIASTOLIC BLOOD PRESSURE: 82 MMHG | WEIGHT: 199 LBS | TEMPERATURE: 98.3 F

## 2025-02-04 DIAGNOSIS — G31.84 MILD COGNITIVE IMPAIRMENT: ICD-10-CM

## 2025-02-04 DIAGNOSIS — I63.9 CVA (CEREBRAL VASCULAR ACCIDENT) (HCC): Primary | ICD-10-CM

## 2025-02-04 PROCEDURE — 99215 OFFICE O/P EST HI 40 MIN: CPT | Performed by: PSYCHIATRY & NEUROLOGY

## 2025-02-04 NOTE — ASSESSMENT & PLAN NOTE
Patient is a 64-year-old male smoker with history of alcohol abuse, colon cancer, hyperlipidemia, aortic ulcer and hypertension who presents as a hospital follow up. Patient presented in October 2024 with right leg weakness. CTA without LVO. MRI brain with acute. nfarcts involving the right corona radiata/gangliocapsular region, left gangliocapsular region and right parietal lobe. Echo with a mildly dilated LA.     Patient today is stable denies any new stroke like sx. Quit drinking and smoking. Following with cardiology at [place].     Impression: cardioembolic vs vessel disease.     Plan:  Follow up on cardiac monitoring   Will focus on stroke risk prevention  Antiplatelet therapy: Aspirin 81 mg   Goal LDL < 70    Continue atorvastatin 80 mg  Monitor blood pressure with goal <130/80  Counseled on lifestyle measures to reduce stroke burden if applicable, such as:  Smoking cessation   Reducing alcohol intake  Encouraging physical activity  Encouraging weight loss  A low-carbohydrate diet reducing the intake of foods rich in carbohydrates, such as bread, pasta, rice, and sugary snacks.Emphasizes foods that are high in protein, healthy fats, and non-starchy vegetables  Regular follow-up with their PCP  If they have any stroke-like symptoms including sudden painless loss of vision or double vision, difficulty speaking or swallowing, vertigo/room spinning that does not quickly resolve, or weakness/numbness affecting 1 side of the face or body he should proceed by ambulance to the nearest emergency room immediately.  Follow-up in 3 months

## 2025-02-04 NOTE — PROGRESS NOTES
Name: Vlad Gregorio      : 1960      MRN: [id number]  Encounter Provider: Milady Moreno MD  Encounter Date: 2025   Encounter department: [facility]  :  Assessment & Plan  CVA (cerebral vascular accident) (HCC)  Patient is a 64-year-old male smoker with history of alcohol abuse, colon cancer, hyperlipidemia, aortic ulcer and hypertension who presents as a hospital follow up. Patient presented in 2024 with right leg weakness. CTA without LVO. MRI brain with acute. nfarcts involving the right corona radiata/gangliocapsular region, left gangliocapsular region and right parietal lobe. Echo with a mildly dilated LA.     Patient today is stable denies any new stroke like sx. Quit drinking and smoking. Following with cardiology at [place].     Impression: cardioembolic vs vessel disease.     Plan:  Follow up on cardiac monitoring   Will focus on stroke risk prevention  Antiplatelet therapy: Aspirin 81 mg   Goal LDL < 70    Continue atorvastatin 80 mg  Monitor blood pressure with goal <130/80  Counseled on lifestyle measures to reduce stroke burden if applicable, such as:  Smoking cessation   Reducing alcohol intake  Encouraging physical activity  Encouraging weight loss  A low-carbohydrate diet reducing the intake of foods rich in carbohydrates, such as bread, pasta, rice, and sugary snacks.Emphasizes foods that are high in protein, healthy fats, and non-starchy vegetables  Regular follow-up with their PCP  If they have any stroke-like symptoms including sudden painless loss of vision or double vision, difficulty speaking or swallowing, vertigo/room spinning that does not quickly resolve, or weakness/numbness affecting 1 side of the face or body he should proceed by ambulance to the nearest emergency room immediately.  Follow-up in 3 months         Mild cognitive impairment  Patient is a  and has worked at the same company for many years.  His Boss has noticed changes in his memory and he was laid of work. Upon review of his FLAIR imaging on MRI, patient with a sizeable vascular burn. Microangiopathic changes that accumulate over time can lead to decline in cognition. Advised to consider a different job position given the dangerous nature of his job.        Follow up in 3 months.         History of Present Illness   HPI    Patient is a 64-year-old male smoker with history of alcohol abuse, colon cancer, hyperlipidemia, aortic ulcer and hypertension who presents as a hospital follow up.     Patient was seen by inpatient neurology team late October 2024. At the time patient presented with right leg weakness. BP on arrival 181/83. NIHSS of 1 for orientation.     Workup:  CT: New hypodense areas involving the bilateral basal ganglia, which may be related to prior infarcts, or worsening chronic microangiopathic changes, but more recent infarcts in these regions cannot be excluded. Recommend further evaluation with brain MRI without contrast. Encephalomalacia involving the right occipital lobe related to prior infarct. Old thalamic lacunar infarct again demonstrated. Mild to moderate chronic microangiopathic white matter changes.    CTA: Irregularity/outpouching along the undersurface of the aortic arch, just distal to the origin of the left subclavian artery, with eccentric adjacent soft tissue density and stranding, extending anteriorly and medially, adjacent to the outpouching in the mediastinum, and eccentric soft tissue density extending along the proximal left subclavian artery, and additional stranding extending into the anterior mediastinum. No visible dissection flap is identified. Given the appearance, this is most concerning for a penetrating atherosclerotic ulcer, with associated rupture given the adjacent mediastinal soft tissue density/hematoma. Recommend dedicated CT angiogram of the aorta with contrast. No large vessel occlusion, high-grade  stenosis, or intracranial aneurysm identified on CT angiogram of the head.Approximately 40 to 50% stenosis of the right cervical internal carotid artery due to atherosclerotic plaque at the right carotid bulb/bifurcation. No stenosis of the left cervical internal carotid artery. No stenosis of the bilateral cervical vertebral arteries.     Multiple pulmonary nodules involving the visualized right lung, including 9 mm right lung apical pulmonary nodule, with adjacent 8 mm irregular pulmonary nodule in the right lung apex and a 6 mm posterior right upper lobe pulmonary nodule. Using 2017 Fleischner guidelines, recommend follow-up low-dose noncontrast chest CT in 3 months time to document stability.     MRI: Acute infarcts involving the right corona radiata/gangliocapsular region, left gangliocapsular region and right parietal lobe. Mild mass effect without midline shift.Moderate-marked chronic microangiopathy.    Echo: EF 65%, left atrium is mildly dilated    LDL 61, Cholesterol 123, A1c 5.5    ELICEO:     Left Ventricle: Left ventricular cavity size is normal. Wall thickness is increased. The left ventricular ejection fraction is 55% by visual estimation. Systolic function is normal. Wall motion is normal.   Right Ventricle: Right ventricular cavity size is normal. Systolic function is normal.  Atrial Septum: No patent foramen ovale detected, confirmed at rest using color doppler and using agitated saline contrast, confirmed by provocation with abdominal compression, using agitated saline contrast.  Aortic Valve: There is mild regurgitation with a centrally directed jet.  Aorta: The aortic root is mildly dilated. The ascending aorta is normal in size. The aortic root is 4.10 cm. The ascending aorta is 3.6 cm.   The procedure was aborted early due to hypoxia from respiratory distress, likely laryngospasm and secretions.    The atherosclerotic ulcer was not thought to be the cause of patients sx. Patient placed on DAPT x  21 days and then continue on ASA monotherapu    Interval history:  Next PT, Vlad. HFU for CVA. Here with female family member. Nice people. Pt presents with slight forgetfulness of words, not an everyday thing but it happens. Also mentioned his eyes water and burn when he wakes up in the morning.   Has cardiac monitoring   Following with cardiology at [place]     Review of Systems   Constitutional:  Negative for appetite change, fatigue and fever.   HENT: Negative.  Negative for hearing loss, tinnitus, trouble swallowing and voice change.    Eyes: Negative.  Negative for photophobia, pain and visual disturbance.   Respiratory: Negative.  Negative for shortness of breath.    Cardiovascular: Negative.  Negative for palpitations.   Gastrointestinal: Negative.  Negative for nausea and vomiting.   Endocrine: Negative.  Negative for cold intolerance.   Genitourinary: Negative.  Negative for dysuria, frequency and urgency.   Musculoskeletal:  Negative for back pain, gait problem, myalgias, neck pain and neck stiffness.   Skin: Negative.  Negative for rash.   Allergic/Immunologic: Negative.    Neurological: Negative.  Negative for dizziness, tremors, seizures, syncope, facial asymmetry, speech difficulty, weakness, light-headedness, numbness and headaches.        Pt presents with slight forgetfulness of words. Also mentioned his eyes water and burn when he wakes up in the morning.   Hematological: Negative.  Does not bruise/bleed easily.   Psychiatric/Behavioral: Negative.  Negative for confusion, hallucinations and sleep disturbance.    All other systems reviewed and are negative.   I have personally reviewed the MA's review of systems and made changes as necessary.         Objective   There were no vitals taken for this visit.    Physical Exam  Eyes:      Extraocular Movements: Extraocular movements intact.   Neurological:      Motor: Motor strength is normal.     Deep Tendon Reflexes:      Reflex Scores:       Bicep  reflexes are 2+ on the right side and 2+ on the left side.       Brachioradialis reflexes are 2+ on the right side and 2+ on the left side.       Patellar reflexes are 2+ on the right side and 2+ on the left side.      Neurological Exam  Mental Status  Awake, alert and oriented to person, place and time.    Cranial Nerves  CN III, IV, VI: Extraocular movements intact bilaterally.  CN VII: Full and symmetric facial movement.  CN XII: Tongue midline without atrophy or fasciculations.    Motor   Strength is 5/5 throughout all four extremities.    Reflexes                                            Right                      Left  Brachioradialis                    2+                         2+  Biceps                                 2+                         2+  Patellar                                2+                         2+    Gait  Casual gait is normal including stance, stride, and arm swing.

## 2025-02-04 NOTE — PATIENT INSTRUCTIONS
Plan:  Follow up on cardiac monitoring   Will focus on stroke risk prevention  Antiplatelet therapy: Aspirin 81 mg   Goal LDL < 70    Continue atorvastatin 80 mg  Monitor blood pressure with goal <130/80  Counseled on lifestyle measures to reduce stroke burden if applicable, such as:  Smoking cessation   Reducing alcohol intake  Encouraging physical activity  Encouraging weight loss  A low-carbohydrate diet reducing the intake of foods rich in carbohydrates, such as bread, pasta, rice, and sugary snacks.Emphasizes foods that are high in protein, healthy fats, and non-starchy vegetables  Regular follow-up with their PCP  If they have any stroke-like symptoms including sudden painless loss of vision or double vision, difficulty speaking or swallowing, vertigo/room spinning that does not quickly resolve, or weakness/numbness affecting 1 side of the face or body he should proceed by ambulance to the nearest emergency room immediately.  Follow-up in 3 months

## 2025-05-05 ENCOUNTER — TELEPHONE (OUTPATIENT)
Dept: NEUROLOGY | Facility: CLINIC | Age: 65
End: 2025-05-05

## 2025-05-05 NOTE — TELEPHONE ENCOUNTER
Spoke with PT daughter and she explained to me that PT lost his insurance and might not be able to make the appointment. I explained to daughter that I will keep the appointment in case this issue gets resolved and if it doesn't then she can cancel the appointment 24 hours beforehand.

## 2025-06-03 ENCOUNTER — APPOINTMENT (EMERGENCY)
Dept: CT IMAGING | Facility: HOSPITAL | Age: 65
DRG: 861 | End: 2025-06-03
Payer: COMMERCIAL

## 2025-06-03 ENCOUNTER — APPOINTMENT (EMERGENCY)
Dept: RADIOLOGY | Facility: HOSPITAL | Age: 65
DRG: 861 | End: 2025-06-03
Payer: COMMERCIAL

## 2025-06-03 ENCOUNTER — HOSPITAL ENCOUNTER (INPATIENT)
Facility: HOSPITAL | Age: 65
LOS: 2 days | Discharge: HOME | DRG: 861 | End: 2025-06-05
Attending: EMERGENCY MEDICINE | Admitting: INTERNAL MEDICINE
Payer: COMMERCIAL

## 2025-06-03 ENCOUNTER — APPOINTMENT (INPATIENT)
Dept: MRI IMAGING | Facility: HOSPITAL | Age: 65
DRG: 861 | End: 2025-06-03
Payer: COMMERCIAL

## 2025-06-03 DIAGNOSIS — R41.82 ALTERED MENTAL STATUS: Primary | ICD-10-CM

## 2025-06-03 DIAGNOSIS — I63.9 CEREBROVASCULAR ACCIDENT (CVA), UNSPECIFIED MECHANISM (HCC): ICD-10-CM

## 2025-06-03 PROBLEM — G31.84 MILD COGNITIVE IMPAIRMENT: Status: ACTIVE | Noted: 2025-06-03

## 2025-06-03 PROBLEM — E87.1 HYPONATREMIA: Status: ACTIVE | Noted: 2025-06-03

## 2025-06-03 PROBLEM — E87.1 HYPONATREMIA: Chronic | Status: ACTIVE | Noted: 2025-06-03

## 2025-06-03 LAB
2HR DELTA HS TROPONIN: -2 NG/L
4HR DELTA HS TROPONIN: -3 NG/L
ALBUMIN SERPL BCG-MCNC: 3.8 G/DL (ref 3.5–5)
ALP SERPL-CCNC: 58 U/L (ref 34–104)
ALT SERPL W P-5'-P-CCNC: 8 U/L (ref 7–52)
AMMONIA PLAS-SCNC: 18 UMOL/L (ref 18–72)
AMPHETAMINES SERPL QL SCN: NEGATIVE
ANION GAP SERPL CALCULATED.3IONS-SCNC: 7 MMOL/L (ref 4–13)
APTT PPP: 28 SECONDS (ref 23–34)
AST SERPL W P-5'-P-CCNC: 13 U/L (ref 13–39)
BACTERIA UR QL AUTO: NORMAL /HPF
BARBITURATES UR QL: NEGATIVE
BASOPHILS # BLD AUTO: 0.03 THOUSANDS/ÂΜL (ref 0–0.1)
BASOPHILS NFR BLD AUTO: 1 % (ref 0–1)
BENZODIAZ UR QL: NEGATIVE
BILIRUB SERPL-MCNC: 1.03 MG/DL (ref 0.2–1)
BILIRUB UR QL STRIP: NEGATIVE
BUN SERPL-MCNC: 12 MG/DL (ref 5–25)
CALCIUM SERPL-MCNC: 8.5 MG/DL (ref 8.4–10.2)
CARDIAC TROPONIN I PNL SERPL HS: 16 NG/L (ref ?–50)
CARDIAC TROPONIN I PNL SERPL HS: 17 NG/L (ref ?–50)
CARDIAC TROPONIN I PNL SERPL HS: 19 NG/L (ref ?–50)
CHLORIDE SERPL-SCNC: 102 MMOL/L (ref 96–108)
CLARITY UR: CLEAR
CO2 SERPL-SCNC: 24 MMOL/L (ref 21–32)
COCAINE UR QL: NEGATIVE
COLOR UR: YELLOW
CREAT SERPL-MCNC: 0.97 MG/DL (ref 0.6–1.3)
EOSINOPHIL # BLD AUTO: 0 THOUSAND/ÂΜL (ref 0–0.61)
EOSINOPHIL NFR BLD AUTO: 0 % (ref 0–6)
ERYTHROCYTE [DISTWIDTH] IN BLOOD BY AUTOMATED COUNT: 12.5 % (ref 11.6–15.1)
ETHANOL SERPL-MCNC: <10 MG/DL
FENTANYL UR QL SCN: NEGATIVE
GFR SERPL CREATININE-BSD FRML MDRD: 82 ML/MIN/1.73SQ M
GLUCOSE SERPL-MCNC: 107 MG/DL (ref 65–140)
GLUCOSE UR STRIP-MCNC: NEGATIVE MG/DL
HCT VFR BLD AUTO: 36.8 % (ref 36.5–49.3)
HGB BLD-MCNC: 12.8 G/DL (ref 12–17)
HGB UR QL STRIP.AUTO: ABNORMAL
HYDROCODONE UR QL SCN: NEGATIVE
IMM GRANULOCYTES # BLD AUTO: 0.02 THOUSAND/UL (ref 0–0.2)
IMM GRANULOCYTES NFR BLD AUTO: 0 % (ref 0–2)
INR PPP: 1.18 (ref 0.85–1.19)
KETONES UR STRIP-MCNC: NEGATIVE MG/DL
LEUKOCYTE ESTERASE UR QL STRIP: NEGATIVE
LYMPHOCYTES # BLD AUTO: 0.6 THOUSANDS/ÂΜL (ref 0.6–4.47)
LYMPHOCYTES NFR BLD AUTO: 13 % (ref 14–44)
MCH RBC QN AUTO: 29 PG (ref 26.8–34.3)
MCHC RBC AUTO-ENTMCNC: 34.8 G/DL (ref 31.4–37.4)
MCV RBC AUTO: 83 FL (ref 82–98)
METHADONE UR QL: NEGATIVE
MONOCYTES # BLD AUTO: 0.46 THOUSAND/ÂΜL (ref 0.17–1.22)
MONOCYTES NFR BLD AUTO: 10 % (ref 4–12)
NEUTROPHILS # BLD AUTO: 3.68 THOUSANDS/ÂΜL (ref 1.85–7.62)
NEUTS SEG NFR BLD AUTO: 76 % (ref 43–75)
NITRITE UR QL STRIP: NEGATIVE
NON-SQ EPI CELLS URNS QL MICRO: NORMAL /HPF
NRBC BLD AUTO-RTO: 0 /100 WBCS
OPIATES UR QL SCN: NEGATIVE
OXYCODONE+OXYMORPHONE UR QL SCN: NEGATIVE
PCP UR QL: NEGATIVE
PH UR STRIP.AUTO: 6 [PH]
PLATELET # BLD AUTO: 171 THOUSANDS/UL (ref 149–390)
PMV BLD AUTO: 9.8 FL (ref 8.9–12.7)
POTASSIUM SERPL-SCNC: 4 MMOL/L (ref 3.5–5.3)
PROT SERPL-MCNC: 6.5 G/DL (ref 6.4–8.4)
PROT UR STRIP-MCNC: NEGATIVE MG/DL
PROTHROMBIN TIME: 15.5 SECONDS (ref 12.3–15)
RBC # BLD AUTO: 4.42 MILLION/UL (ref 3.88–5.62)
RBC #/AREA URNS AUTO: NORMAL /HPF
SODIUM SERPL-SCNC: 133 MMOL/L (ref 135–147)
SP GR UR STRIP.AUTO: 1.02 (ref 1–1.03)
THC UR QL: POSITIVE
TSH SERPL DL<=0.05 MIU/L-ACNC: 1.33 UIU/ML (ref 0.45–4.5)
UROBILINOGEN UR STRIP-ACNC: 2 MG/DL
WBC # BLD AUTO: 4.79 THOUSAND/UL (ref 4.31–10.16)
WBC #/AREA URNS AUTO: NORMAL /HPF

## 2025-06-03 PROCEDURE — 84484 ASSAY OF TROPONIN QUANT: CPT

## 2025-06-03 PROCEDURE — 99285 EMERGENCY DEPT VISIT HI MDM: CPT | Performed by: EMERGENCY MEDICINE

## 2025-06-03 PROCEDURE — 85025 COMPLETE CBC W/AUTO DIFF WBC: CPT

## 2025-06-03 PROCEDURE — 85730 THROMBOPLASTIN TIME PARTIAL: CPT | Performed by: EMERGENCY MEDICINE

## 2025-06-03 PROCEDURE — 70551 MRI BRAIN STEM W/O DYE: CPT

## 2025-06-03 PROCEDURE — 99223 1ST HOSP IP/OBS HIGH 75: CPT | Performed by: INTERNAL MEDICINE

## 2025-06-03 PROCEDURE — 36415 COLL VENOUS BLD VENIPUNCTURE: CPT

## 2025-06-03 PROCEDURE — 82077 ASSAY SPEC XCP UR&BREATH IA: CPT | Performed by: EMERGENCY MEDICINE

## 2025-06-03 PROCEDURE — 99285 EMERGENCY DEPT VISIT HI MDM: CPT

## 2025-06-03 PROCEDURE — 70496 CT ANGIOGRAPHY HEAD: CPT

## 2025-06-03 PROCEDURE — 84443 ASSAY THYROID STIM HORMONE: CPT | Performed by: EMERGENCY MEDICINE

## 2025-06-03 PROCEDURE — 94664 DEMO&/EVAL PT USE INHALER: CPT

## 2025-06-03 PROCEDURE — 81001 URINALYSIS AUTO W/SCOPE: CPT | Performed by: EMERGENCY MEDICINE

## 2025-06-03 PROCEDURE — 80053 COMPREHEN METABOLIC PANEL: CPT

## 2025-06-03 PROCEDURE — 71045 X-RAY EXAM CHEST 1 VIEW: CPT

## 2025-06-03 PROCEDURE — 99255 IP/OBS CONSLTJ NEW/EST HI 80: CPT | Performed by: PHYSICIAN ASSISTANT

## 2025-06-03 PROCEDURE — 82140 ASSAY OF AMMONIA: CPT | Performed by: EMERGENCY MEDICINE

## 2025-06-03 PROCEDURE — 93005 ELECTROCARDIOGRAM TRACING: CPT

## 2025-06-03 PROCEDURE — 70498 CT ANGIOGRAPHY NECK: CPT

## 2025-06-03 PROCEDURE — 85610 PROTHROMBIN TIME: CPT | Performed by: EMERGENCY MEDICINE

## 2025-06-03 PROCEDURE — 80307 DRUG TEST PRSMV CHEM ANLYZR: CPT | Performed by: EMERGENCY MEDICINE

## 2025-06-03 RX ORDER — ENOXAPARIN SODIUM 100 MG/ML
40 INJECTION SUBCUTANEOUS DAILY
Status: DISCONTINUED | OUTPATIENT
Start: 2025-06-03 | End: 2025-06-05 | Stop reason: HOSPADM

## 2025-06-03 RX ORDER — SODIUM CHLORIDE 9 MG/ML
75 INJECTION, SOLUTION INTRAVENOUS CONTINUOUS
Status: DISCONTINUED | OUTPATIENT
Start: 2025-06-03 | End: 2025-06-03

## 2025-06-03 RX ORDER — CARVEDILOL 3.12 MG/1
6.25 TABLET ORAL 2 TIMES DAILY WITH MEALS
Status: DISCONTINUED | OUTPATIENT
Start: 2025-06-03 | End: 2025-06-05 | Stop reason: HOSPADM

## 2025-06-03 RX ORDER — ATORVASTATIN CALCIUM 40 MG/1
40 TABLET, FILM COATED ORAL
Status: DISCONTINUED | OUTPATIENT
Start: 2025-06-03 | End: 2025-06-03

## 2025-06-03 RX ORDER — AMLODIPINE BESYLATE 5 MG/1
5 TABLET ORAL DAILY
Status: DISCONTINUED | OUTPATIENT
Start: 2025-06-03 | End: 2025-06-04

## 2025-06-03 RX ORDER — HYDRALAZINE HYDROCHLORIDE 20 MG/ML
10 INJECTION INTRAMUSCULAR; INTRAVENOUS EVERY 6 HOURS PRN
Status: DISCONTINUED | OUTPATIENT
Start: 2025-06-03 | End: 2025-06-05 | Stop reason: HOSPADM

## 2025-06-03 RX ORDER — ACETAMINOPHEN 325 MG/1
650 TABLET ORAL EVERY 6 HOURS PRN
Status: DISCONTINUED | OUTPATIENT
Start: 2025-06-03 | End: 2025-06-05 | Stop reason: HOSPADM

## 2025-06-03 RX ORDER — ONDANSETRON 2 MG/ML
4 INJECTION INTRAMUSCULAR; INTRAVENOUS EVERY 6 HOURS PRN
Status: DISCONTINUED | OUTPATIENT
Start: 2025-06-03 | End: 2025-06-05 | Stop reason: HOSPADM

## 2025-06-03 RX ORDER — ALBUTEROL SULFATE 0.83 MG/ML
2.5 SOLUTION RESPIRATORY (INHALATION) EVERY 4 HOURS PRN
Status: DISCONTINUED | OUTPATIENT
Start: 2025-06-03 | End: 2025-06-05 | Stop reason: HOSPADM

## 2025-06-03 RX ORDER — ATORVASTATIN CALCIUM 40 MG/1
80 TABLET, FILM COATED ORAL
Status: DISCONTINUED | OUTPATIENT
Start: 2025-06-03 | End: 2025-06-05 | Stop reason: HOSPADM

## 2025-06-03 RX ORDER — ASPIRIN 81 MG/1
81 TABLET ORAL DAILY
Status: DISCONTINUED | OUTPATIENT
Start: 2025-06-03 | End: 2025-06-05 | Stop reason: HOSPADM

## 2025-06-03 RX ADMIN — ATORVASTATIN CALCIUM 80 MG: 40 TABLET, FILM COATED ORAL at 16:13

## 2025-06-03 RX ADMIN — SODIUM CHLORIDE 75 ML/HR: 0.9 INJECTION, SOLUTION INTRAVENOUS at 12:16

## 2025-06-03 RX ADMIN — AMLODIPINE BESYLATE 5 MG: 5 TABLET ORAL at 13:55

## 2025-06-03 RX ADMIN — IOHEXOL 75 ML: 350 INJECTION, SOLUTION INTRAVENOUS at 06:49

## 2025-06-03 RX ADMIN — ENOXAPARIN SODIUM 40 MG: 100 INJECTION SUBCUTANEOUS at 12:17

## 2025-06-03 RX ADMIN — ASPIRIN 81 MG: 81 TABLET, COATED ORAL at 13:55

## 2025-06-03 RX ADMIN — CARVEDILOL 6.25 MG: 3.12 TABLET, FILM COATED ORAL at 16:13

## 2025-06-03 NOTE — RESPIRATORY THERAPY NOTE
"RT Protocol Note  Vlad Gregorio 64 y.o. male MRN: [id number]  Unit/Bed#: -01 Encounter: [id number]    Assessment    Principal Problem:    Altered mental status  Active Problems:    Tobacco abuse    Alcohol abuse    CVA (cerebral vascular accident) (HCC)    Primary hypertension    Mixed hyperlipidemia    Hyponatremia    Mild cognitive impairment      Home Pulmonary Medications:  none   06/03/25 1352   Respiratory Protocol   Protocol Initiated? Yes   Protocol Selection Respiratory   Language Barrier? No   Medical & Social History Reviewed? Yes   Diagnostic Studies Reviewed? Yes   Physical Assessment Performed? Yes   Respiratory Plan No distress/Pulmonary history  (smoking hx)   Respiratory Assessment   Assessment Type Assess only   General Appearance Awake   Respiratory Pattern Normal   Chest Assessment Chest expansion symmetrical   Bilateral Breath Sounds Diminished  (faint wheeze)   Cough None   Resp Comments plan to order neb tx's prn   O2 Device RA            Past Medical History[1]  Social History[1]    Subjective         Objective    Physical Exam:   Assessment Type: (P) Assess only  General Appearance: (P) Awake  Respiratory Pattern: (P) Normal  Chest Assessment: (P) Chest expansion symmetrical  Bilateral Breath Sounds: (P) Diminished (faint wheeze)  Cough: (P) None  O2 Device: (P) RA    Vitals:  Blood pressure 141/72, pulse 74, temperature 98.2 °F (36.8 °C), temperature source Oral, resp. rate 18, height 5' 6\" (1.676 m), weight 90.3 kg (199 lb 1.2 oz), SpO2 97%.          Imaging and other studies:     O2 Device: (P) RA     Plan    Respiratory Plan: (P) No distress/Pulmonary history (smoking hx)        Resp Comments: (P) plan to order neb tx's prn        [1]   Past Medical History:  Diagnosis Date    Alcohol use     no known liver dysfunction    Carotid stenosis, asymptomatic, right     40-50% JOSE at bifurcation/bulb    Chronic bronchitis (HCC)     Current tobacco use     History of CVA (cerebrovascular " "accident)     thalmic, right occipital, b/l basal ganglia    History of rectal cancer     s/p resection    Obesity     Pulmonary nodules     right apical/RUL   [1]   Social History  Socioeconomic History    Marital status:    Tobacco Use    Smoking status: Former     Current packs/day: 0.50     Average packs/day: 0.5 packs/day for 50.4 years (25.2 ttl pk-yrs)     Types: Cigarettes     Start date:     Smokeless tobacco: Never    Tobacco comments:     1ppd for most of his life, has weaned down to 1/2 ppd over the past year or two   Substance and Sexual Activity    Alcohol use: Not Currently     Comment: 1 case of beer over the weekend for \"many years\"    Drug use: Not Currently     Types: Marijuana     Social Drivers of Health     Food Insecurity: No Food Insecurity (6/3/2025)    Nursing - Inadequate Food Risk Classification     Worried About Running Out of Food in the Last Year: Never true     Ran Out of Food in the Last Year: Never true     Ran Out of Food in the Last Year: Never true   Transportation Needs: No Transportation Needs (6/3/2025)    Nursing - Transportation Risk Classification     Lack of Transportation: No   Intimate Partner Violence: Unknown (6/3/2025)    Nursing IPS     Physically Hurt by Someone: No     Hurt or Threatened by Someone: No   Housing Stability: Unknown (6/3/2025)    Nursing: Inadequate Housing Risk Classification     Unable to Pay for Housing in the Last Year: No     Has Housin     "

## 2025-06-03 NOTE — ASSESSMENT & PLAN NOTE
Patient has history of mild cognitive impairment  Follow-ups with neurology Dr. Moreno as outpatient

## 2025-06-03 NOTE — ASSESSMENT & PLAN NOTE
Patient is noncompliant with his home medications per family  Patient is supposed to be on Coreg, Norvasc and hydrochlorothiazide  Will start on Coreg 6.25 mg twice daily and Norvasc 5 mg daily  Hydralazine as needed

## 2025-06-03 NOTE — ASSESSMENT & PLAN NOTE
"64 y.o.  male with prior embolic appearing strokes (10/2024), tobacco use, prior alcohol abuse, prior TIA (2023), history of colon cancer, who presented to [facility] on 6/3/25 with AMS.     Workup  - CTA H/N wwo contrast 6/3/25:   \"CT Brain: No acute intracranial abnormality. Chronic and nonemergent findings above.  CT Angiography: No acute vascular pathology in the head or neck. Unchanged mild right right internal carotid and moderate to marked right posterior cerebral artery stenoses.\"  - Labs:  -UDS positive for THC, otherwise negative  -Ammonia: 18  - Ethanol <10     Plan  - recommend MRI brain wo contrast.   - Medical management and correction of metabolic and infectious disturbances per primary team   - Avoid CNS altering medications if possible  - Continue supportive care   - Continue to monitor neurologic status. Notify neurology with any changes in exam.         "
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
## 2025-06-03 NOTE — ASSESSMENT & PLAN NOTE
Patient with altered mental status and abnormal behavior at home  CT head showed no acute abnormality.  Chronic small right basal ganglia, right corona radiata and right occipital infarcts.  CTA head and neck showed no acute vascular pathology  Chest x-rays unremarkable  UA is unremarkable  Urine drug screen positive for THC  Neurology consult  As per neurology no need for stroke pathway  MRI brain without contrast for completion  Neurochecks per protocol  Continue on aspirin, statin

## 2025-06-03 NOTE — ED CARE HANDOFF
Emergency Department Sign Out Note        Sign out and transfer of care from provider. See Separate Emergency Department note.     The patient, Vlad Gregorio, was evaluated by the previous provider for altered mental status.    Workup Completed:  Blood work    ED Course / Workup Pending (followup):  CTA head/neck        No data recorded     Stroke Assessment       Row Name 06/03/25 0444             NIH Stroke Scale    Interval Baseline      Level of Consciousness (1a.) 0      LOC Questions (1b.) 0      LOC Commands (1c.) 0      Best Gaze (2.) 0      Visual (3.) 0      Facial Palsy (4.) 0      Motor Arm, Left (5a.) 0      Motor Arm, Right (5b.) 0      Motor Leg, Left (6a.) 0      Motor Leg, Right (6b.) 0      Limb Ataxia (7.) 0      Sensory (8.) 0      Best Language (9.) 0      Dysarthria (10.) 0      Extinction and Inattention (11.) (Formerly Neglect) 0      Total 0                    Flowsheet Row Most Recent Value   Thrombolytic Decision Options    Thrombolytic Decision Patient not a candidate.   Patient is not a candidate options Unclear time of onset outside appropriate time window., Symptoms resolved/clearly non disabling.                              ED Course as of 06/03/25 0832   Tue Jun 03, 2025   0720 Patient presenting with altered mental status and abnormal behavior at home.  History of dementia.  Patient will be admitted pending CTA head/neck.   0815 Called and spoke with daughter who states that patient has some baseline confusion but has had multiple TIAs and stroke in the past. His last stroke was last October. 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 Patient has been known to smoke marijuana for many years. Usually patient is within enough to stay home however his behaviors became very dangerous last night. Family is concerned about patient's safety at home. Once medically cleared, family is considering nursing home placement.  "  0829 Focal neurologist on-call, Dr. Perkins who notes the following:  \"I would not be very suspicious that the behaviors described are resultant from stroke/TIA. Sounds more consistent with dementia/sundowning possibly exacerbated by MJ use. That said, while I don't think he needs the stroke pathway, given the prior hx of potentially embolic strokes( cannot tell if he had the recommended Zio/loop to investigate poss PAF- maybe through [facility]?), a repeat MRI would be reasonable if he is admitted for placement - just to see if there is any evidence of additional recent or not so recent strokes\"   0830 Patient accepted by [facility] team for admission.     Procedures  Medical Decision Making  Patient signed out pending admission after CT head/neck.  Inpatient team recommended speaking to neurologist who recommends admission for MRI to medically clear patient.  I spoke with patient's daughter who is now concerned about patient's safety at home and is requesting possible nursing home placement.  At this time patient will be admitted for further evaluation management.    Amount and/or Complexity of Data Reviewed  Labs:  Decision-making details documented in ED Course.  Radiology:  Decision-making details documented in ED Course.    Risk  Prescription drug management.  Decision regarding hospitalization.            Disposition  Final diagnoses:   Altered mental status     Time reflects when diagnosis was documented in both MDM as applicable and the Disposition within this note       Time User Action Codes Description Comment    6/3/2025  8:30 AM Juni Gallegos Add [R41.82] Altered mental status           ED Disposition       ED Disposition   Admit    Condition   Stable    Date/Time   Tue Josep 3, 2025  8:31 AM    Comment   Case was discussed with Dr. Thompson and the patient's admission status was agreed to be Admission Status: inpatient status to the service of Dr. Castro.               Follow-up Information    None   "     Patient's Medications   Discharge Prescriptions    No medications on file     No discharge procedures on file.       ED Provider  Electronically Signed by     Juni Gallegos DO  06/03/25 0817
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
## 2025-06-03 NOTE — PLAN OF CARE
Problem: Potential for Falls  Goal: Patient will remain free of falls  Description: INTERVENTIONS:  - Educate patient/family on patient safety including physical limitations  - Instruct patient to call for assistance with activity   - Consider consulting OT/PT to assist with strengthening/mobility based on AM PAC & JH-HLM score  - Consult OT/PT to assist with strengthening/mobility   - Keep Call bell within reach  - Keep bed low and locked with side rails adjusted as appropriate  - Keep care items and personal belongings within reach  - Initiate and maintain comfort rounds  - Make Fall Risk Sign visible to staff  - Offer Toileting every 2 Hours, in advance of need  - Initiate/Maintain 2 alarm  - Obtain necessary fall risk management equipment: 2  - Apply yellow socks and bracelet for high fall risk patients  - Consider moving patient to room near nurses station  Outcome: Progressing     Problem: PAIN - ADULT  Goal: Verbalizes/displays adequate comfort level or baseline comfort level  Description: Interventions:  - Encourage patient to monitor pain and request assistance  - Assess pain using appropriate pain scale  - Administer analgesics as ordered based on type and severity of pain and evaluate response  - Implement non-pharmacological measures as appropriate and evaluate response  - Consider cultural and social influences on pain and pain management  - Notify physician/advanced practitioner if interventions unsuccessful or patient reports new pain  - Educate patient/family on pain management process including their role and importance of  reporting pain   - Provide non-pharmacologic/complimentary pain relief interventions  Outcome: Progressing     Problem: DISCHARGE PLANNING  Goal: Discharge to home or other facility with appropriate resources  Description: INTERVENTIONS:  - Identify barriers to discharge w/patient and caregiver  - Arrange for needed discharge resources and transportation as appropriate  -  Identify discharge learning needs (meds, wound care, etc.)  - Arrange for interpretive services to assist at discharge as needed  - Refer to Case Management Department for coordinating discharge planning if the patient needs post-hospital services based on physician/advanced practitioner order or complex needs related to functional status, cognitive ability, or social support system  Outcome: Progressing     Problem: Knowledge Deficit  Goal: Patient/family/caregiver demonstrates understanding of disease process, treatment plan, medications, and discharge instructions  Description: Complete learning assessment and assess knowledge base.  Interventions:  - Provide teaching at level of understanding  - Provide teaching via preferred learning methods  Outcome: Progressing

## 2025-06-04 ENCOUNTER — TELEPHONE (OUTPATIENT)
Age: 65
End: 2025-06-04

## 2025-06-04 PROBLEM — F03.918 DEMENTIA WITH BEHAVIORAL DISTURBANCE (HCC): Status: ACTIVE | Noted: 2025-06-04

## 2025-06-04 LAB
ALBUMIN SERPL BCG-MCNC: 3.9 G/DL (ref 3.5–5)
ALP SERPL-CCNC: 61 U/L (ref 34–104)
ALT SERPL W P-5'-P-CCNC: 10 U/L (ref 7–52)
ANION GAP SERPL CALCULATED.3IONS-SCNC: 7 MMOL/L (ref 4–13)
AST SERPL W P-5'-P-CCNC: 15 U/L (ref 13–39)
ATRIAL RATE: 68 BPM
BASOPHILS # BLD AUTO: 0.03 THOUSANDS/ÂΜL (ref 0–0.1)
BASOPHILS NFR BLD AUTO: 1 % (ref 0–1)
BILIRUB SERPL-MCNC: 1.51 MG/DL (ref 0.2–1)
BUN SERPL-MCNC: 16 MG/DL (ref 5–25)
CALCIUM SERPL-MCNC: 8.8 MG/DL (ref 8.4–10.2)
CHLORIDE SERPL-SCNC: 103 MMOL/L (ref 96–108)
CHOLEST SERPL-MCNC: 114 MG/DL (ref ?–200)
CO2 SERPL-SCNC: 23 MMOL/L (ref 21–32)
CREAT SERPL-MCNC: 1.11 MG/DL (ref 0.6–1.3)
EOSINOPHIL # BLD AUTO: 0 THOUSAND/ÂΜL (ref 0–0.61)
EOSINOPHIL NFR BLD AUTO: 0 % (ref 0–6)
ERYTHROCYTE [DISTWIDTH] IN BLOOD BY AUTOMATED COUNT: 12.5 % (ref 11.6–15.1)
EST. AVERAGE GLUCOSE BLD GHB EST-MCNC: 105 MG/DL
GFR SERPL CREATININE-BSD FRML MDRD: 70 ML/MIN/1.73SQ M
GLUCOSE SERPL-MCNC: 92 MG/DL (ref 65–140)
HBA1C MFR BLD: 5.3 %
HCT VFR BLD AUTO: 36.1 % (ref 36.5–49.3)
HDLC SERPL-MCNC: 40 MG/DL
HGB BLD-MCNC: 12.6 G/DL (ref 12–17)
IMM GRANULOCYTES # BLD AUTO: 0.02 THOUSAND/UL (ref 0–0.2)
IMM GRANULOCYTES NFR BLD AUTO: 0 % (ref 0–2)
LDLC SERPL CALC-MCNC: 64 MG/DL (ref 0–100)
LYMPHOCYTES # BLD AUTO: 0.58 THOUSANDS/ÂΜL (ref 0.6–4.47)
LYMPHOCYTES NFR BLD AUTO: 11 % (ref 14–44)
MAGNESIUM SERPL-MCNC: 2.1 MG/DL (ref 1.9–2.7)
MCH RBC QN AUTO: 28.9 PG (ref 26.8–34.3)
MCHC RBC AUTO-ENTMCNC: 34.9 G/DL (ref 31.4–37.4)
MCV RBC AUTO: 83 FL (ref 82–98)
MONOCYTES # BLD AUTO: 0.42 THOUSAND/ÂΜL (ref 0.17–1.22)
MONOCYTES NFR BLD AUTO: 8 % (ref 4–12)
NEUTROPHILS # BLD AUTO: 4.04 THOUSANDS/ÂΜL (ref 1.85–7.62)
NEUTS SEG NFR BLD AUTO: 80 % (ref 43–75)
NONHDLC SERPL-MCNC: 74 MG/DL
NRBC BLD AUTO-RTO: 0 /100 WBCS
P AXIS: 67 DEGREES
PHOSPHATE SERPL-MCNC: 2.9 MG/DL (ref 2.3–4.1)
PLATELET # BLD AUTO: 160 THOUSANDS/UL (ref 149–390)
PMV BLD AUTO: 9.8 FL (ref 8.9–12.7)
POTASSIUM SERPL-SCNC: 4.2 MMOL/L (ref 3.5–5.3)
PR INTERVAL: 216 MS
PROT SERPL-MCNC: 6.7 G/DL (ref 6.4–8.4)
QRS AXIS: 61 DEGREES
QRSD INTERVAL: 88 MS
QT INTERVAL: 390 MS
QTC INTERVAL: 414 MS
RBC # BLD AUTO: 4.36 MILLION/UL (ref 3.88–5.62)
SODIUM SERPL-SCNC: 133 MMOL/L (ref 135–147)
T WAVE AXIS: 223 DEGREES
TRIGL SERPL-MCNC: 52 MG/DL (ref ?–150)
TSH SERPL DL<=0.05 MIU/L-ACNC: 0.88 UIU/ML (ref 0.45–4.5)
VENTRICULAR RATE: 68 BPM
WBC # BLD AUTO: 5.09 THOUSAND/UL (ref 4.31–10.16)

## 2025-06-04 PROCEDURE — 84443 ASSAY THYROID STIM HORMONE: CPT | Performed by: INTERNAL MEDICINE

## 2025-06-04 PROCEDURE — 83036 HEMOGLOBIN GLYCOSYLATED A1C: CPT | Performed by: PHYSICIAN ASSISTANT

## 2025-06-04 PROCEDURE — 99232 SBSQ HOSP IP/OBS MODERATE 35: CPT | Performed by: INTERNAL MEDICINE

## 2025-06-04 PROCEDURE — 99233 SBSQ HOSP IP/OBS HIGH 50: CPT | Performed by: PSYCHIATRY & NEUROLOGY

## 2025-06-04 PROCEDURE — 80053 COMPREHEN METABOLIC PANEL: CPT | Performed by: INTERNAL MEDICINE

## 2025-06-04 PROCEDURE — 80061 LIPID PANEL: CPT | Performed by: INTERNAL MEDICINE

## 2025-06-04 PROCEDURE — 83735 ASSAY OF MAGNESIUM: CPT | Performed by: INTERNAL MEDICINE

## 2025-06-04 PROCEDURE — 85025 COMPLETE CBC W/AUTO DIFF WBC: CPT | Performed by: INTERNAL MEDICINE

## 2025-06-04 PROCEDURE — 93010 ELECTROCARDIOGRAM REPORT: CPT | Performed by: INTERNAL MEDICINE

## 2025-06-04 PROCEDURE — 84100 ASSAY OF PHOSPHORUS: CPT | Performed by: INTERNAL MEDICINE

## 2025-06-04 PROCEDURE — 92610 EVALUATE SWALLOWING FUNCTION: CPT

## 2025-06-04 PROCEDURE — 99254 IP/OBS CNSLTJ NEW/EST MOD 60: CPT | Performed by: STUDENT IN AN ORGANIZED HEALTH CARE EDUCATION/TRAINING PROGRAM

## 2025-06-04 RX ORDER — DIVALPROEX SODIUM 125 MG/1
125 TABLET, DELAYED RELEASE ORAL EVERY 12 HOURS SCHEDULED
Status: DISCONTINUED | OUTPATIENT
Start: 2025-06-04 | End: 2025-06-05 | Stop reason: HOSPADM

## 2025-06-04 RX ORDER — AMLODIPINE BESYLATE 5 MG/1
10 TABLET ORAL DAILY
Status: DISCONTINUED | OUTPATIENT
Start: 2025-06-05 | End: 2025-06-05 | Stop reason: HOSPADM

## 2025-06-04 RX ADMIN — ASPIRIN 81 MG: 81 TABLET, COATED ORAL at 09:32

## 2025-06-04 RX ADMIN — Medication 3 MG: at 21:14

## 2025-06-04 RX ADMIN — ENOXAPARIN SODIUM 40 MG: 100 INJECTION SUBCUTANEOUS at 09:32

## 2025-06-04 RX ADMIN — POTASSIUM & SODIUM PHOSPHATES POWDER PACK 280-160-250 MG 1 PACKET: 280-160-250 PACK at 16:23

## 2025-06-04 RX ADMIN — CARVEDILOL 6.25 MG: 3.12 TABLET, FILM COATED ORAL at 16:23

## 2025-06-04 RX ADMIN — AMLODIPINE BESYLATE 5 MG: 5 TABLET ORAL at 09:35

## 2025-06-04 RX ADMIN — POTASSIUM & SODIUM PHOSPHATES POWDER PACK 280-160-250 MG 1 PACKET: 280-160-250 PACK at 09:36

## 2025-06-04 RX ADMIN — Medication 3 MG: at 02:22

## 2025-06-04 RX ADMIN — DIVALPROEX SODIUM 125 MG: 125 TABLET, DELAYED RELEASE ORAL at 13:21

## 2025-06-04 RX ADMIN — ATORVASTATIN CALCIUM 80 MG: 40 TABLET, FILM COATED ORAL at 16:23

## 2025-06-04 RX ADMIN — NICOTINE POLACRILEX 2 MG: 2 GUM, CHEWING BUCCAL at 02:22

## 2025-06-04 RX ADMIN — DIVALPROEX SODIUM 125 MG: 125 TABLET, DELAYED RELEASE ORAL at 21:14

## 2025-06-04 RX ADMIN — CARVEDILOL 6.25 MG: 3.12 TABLET, FILM COATED ORAL at 09:30
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
## 2025-06-04 NOTE — PROGRESS NOTES
"Progress Note - Hospitalist   Name: Vlad Gregorio 64 y.o. male I MRN: [id number]  Unit/Bed#: -01 I Date of Admission: 6/3/2025   Date of Service: 6/4/2025 I Hospital Day: 1    Assessment & Plan  Altered mental status  Patient with altered mental status and abnormal behavior at home  CT head showed no acute abnormality.  Chronic small right basal ganglia, right corona radiata and right occipital infarcts.  CTA head and neck showed no acute vascular pathology  Chest x-rays unremarkable  UA is unremarkable  Urine drug screen positive for THC  Neurology consult  MRI brain without contrast - See below  Neurochecks per protocol  Continue on aspirin, statin  Tobacco abuse  On nicotine patch  Alcohol abuse  Patient has history of alcohol abuse  Has been sober per family  CVA (cerebral vascular accident) (HCC)  Patient placed on stroke pathway  MRI brain wo contrast 6/3/25:   \"Acute small infarcts in right parietal lobe. Multifocal chronic infarcts with severe chronic microangiopathy\"  Lipid panel, TSH and hemoglobin A1c  Neurology consult appreciated  Patient is nonadherent with his medications  Neurochecks per protocol  PT/OT/ST  Patient is recommended by neurology for anticoagulation.  Will discuss with patient's family  Continue on aspirin and statin  Outpatient ZIO versus loop recorder  Primary hypertension  Patient is noncompliant with his home medications per family  Patient is supposed to be on Coreg, Norvasc and hydrochlorothiazide  On Coreg 6.25 mg twice daily and Norvasc 10 mg daily  Hydralazine as needed  Mixed hyperlipidemia  On statin  Hyponatremia  Patient admitted with sodium of 133  IV fluids  Trend BMP  Mild cognitive impairment  Patient has history of mild cognitive impairment  Follow-ups with neurology Dr. Moreno as outpatient  Dementia with behavioral disturbance (HCC)  See above    Labs & Imaging: Results Review Statement: No pertinent imaging studies reviewed.    VTE Prophylaxis: in " "place.    Code Status:   Level 1 - Full Code    Patient Centered Rounds: I have performed bedside rounds with nursing staff today.    Mobility:   Basic Mobility Inpatient Raw Score: 20  JH-HLM Goal: 6: Walk 10 steps or more  JH-HLM Achieved: 6: Walk 10 steps or more  JH-HLM Goal achieved. Continue to encourage appropriate mobility.    Discussions with Specialists or Other Care Team Provider: Neurology       Education and Discussions with Family / Patient: Son    Total Time Spent on Date of Encounter in care of patient: 35 mins. This time was spent on one or more of the following: performing physical exam; counseling and coordination of care; obtaining or reviewing history; documenting in the medical record; reviewing/ordering tests, medications or procedures; communicating with other healthcare professionals and discussing with patient's family/caregivers.    Current Length of Stay: 1 day(s)    Current Patient Status: Inpatient   Certification Statement: The patient will continue to require additional inpatient hospital stay due to see my assessment and plan.     Subjective:   Patient is seen and examined at bedside.  No new complains. Afebrile  All other ROS are negative.    Objective:    Vitals: Blood pressure 145/67, pulse 67, temperature (!) 97.1 °F (36.2 °C), temperature source Axillary, resp. rate 18, height 5' 6\" (1.676 m), weight 90.3 kg (199 lb 1.2 oz), SpO2 97%.,Body mass index is 32.13 kg/m².  SPO2 RA Rest      Flowsheet Row ED to Hosp-Admission (Current) from 6/3/2025 in [facility] Med Surg Unit   SpO2 97 %   SpO2 Activity At Rest   O2 Device None (Room air)   O2 Flow Rate --          I&O:   Intake/Output Summary (Last 24 hours) at 6/4/2025 1212  Last data filed at 6/4/2025 0817  Gross per 24 hour   Intake 1243.75 ml   Output 1 ml   Net 1242.75 ml       Physical Exam:    General- Alert, lying comfortably in bed. Not in any acute distress.  Neck- Supple, No JVD  CVS- regular, S1 and S2 " "normal  Chest- Bilateral Air entry, No rhochi, crackles or wheezing present.  Abdomen- soft, nontender, not distended, no guarding or rigidity, BS+  Extremities-  No pedal edema, No calf tenderness  CNS-   Alert, awake. No focal deficits present.    Invasive Devices       None                         Social History  reviewed  Family History[1] reviewed    Meds:  Current Medications[2]     Medications Prior to Admission:     amLODIPine (NORVASC) 5 mg tablet    aspirin (ECOTRIN LOW STRENGTH) 81 mg EC tablet    atorvastatin (LIPITOR) 80 mg tablet    carvedilol (COREG) 12.5 mg tablet    hydroCHLOROthiazide 12.5 mg tablet    loperamide (IMODIUM A-D) 2 MG tablet    miconazole 2 % cream    nicotine (NICODERM CQ) 14 mg/24hr TD 24 hr patch    PHENobarbital 15 mg tablet    thiamine (VITAMIN B1) 100 mg tablet    Labs:  Results from last 7 days   Lab Units 06/04/25  0419 06/03/25  0428   WBC Thousand/uL 5.09 4.79   HEMOGLOBIN g/dL 12.6 12.8   HEMATOCRIT % 36.1* 36.8   PLATELETS Thousands/uL 160 171   SEGS PCT % 80* 76*   LYMPHO PCT % 11* 13*   MONO PCT % 8 10   EOS PCT % 0 0     Results from last 7 days   Lab Units 06/04/25  0419 06/03/25  0428   POTASSIUM mmol/L 4.2 4.0   CHLORIDE mmol/L 103 102   CO2 mmol/L 23 24   BUN mg/dL 16 12   CREATININE mg/dL 1.11 0.97   CALCIUM mg/dL 8.8 8.5   ALK PHOS U/L 61 58   ALT U/L 10 8   AST U/L 15 13     Lab Results   Component Value Date    TROPONINI <0.02 01/27/2018    TROPONINI <0.02 01/26/2018    TROPONINI <0.02 01/26/2018    CKTOTAL 648 (H) 11/01/2024     Results from last 7 days   Lab Units 06/03/25  0439   INR  1.18     No results found for: \"BLOODCX\", \"URINECX\", \"WOUNDCULT\", \"SPUTUMCULTUR\"      Imaging:  Results for orders placed during the hospital encounter of 06/03/25    XR chest portable    Narrative  XR CHEST PORTABLE    INDICATION: Acute Resp Failure.    COMPARISON: 10/29/2024.    FINDINGS:    No focal airspace consolidation, pleural effusion, signs of congestion, or " pneumothorax.  Cardiomediastinal silhouette is unremarkable.    Osseous structures are grossly intact.    Impression  No acute cardiopulmonary disease.        Workstation performed: [id number]    No results found for this or any previous visit.      Last 24 Hours Medication List:   Current Facility-Administered Medications   Medication Dose Route Frequency Provider Last Rate    acetaminophen  650 mg Oral Q6H PRN Christian Castro MD      albuterol  2.5 mg Nebulization Q4H PRN Christian Castro MD      amLODIPine  5 mg Oral Daily Christian Castro MD      aspirin  81 mg Oral Daily Christian Castro MD      atorvastatin  80 mg Oral Daily With Dinner Christian Castro MD      carvedilol  6.25 mg Oral BID With Meals Christian Castro MD      enoxaparin  40 mg Subcutaneous Daily Christian Castro MD      hydrALAZINE  10 mg Intravenous Q6H PRN Christian Castro MD      melatonin  3 mg Oral HS Alethea Roldan PA-C      nicotine polacrilex  2 mg Oral Q2H PRN Alethea Roldan PA-C      ondansetron  4 mg Intravenous Q6H PRN Christian Castro MD      potassium-sodium phosphates  1 packet Oral BID With Meals Alethea Roldan PA-C          Today, Patient Was Seen By: Christian Castro MD    ** Please Note: Dictation voice to text software may have been used in the creation of this document. **             [1] No family history on file.  [2]   Current Facility-Administered Medications   Medication Dose Route Frequency Provider Last Rate Last Admin    acetaminophen (TYLENOL) tablet 650 mg  650 mg Oral Q6H PRN Christian Castro MD        albuterol inhalation solution 2.5 mg  2.5 mg Nebulization Q4H PRN Christian Castro MD        amLODIPine (NORVASC) tablet 5 mg  5 mg Oral Daily Christian Castro MD   5 mg at 06/04/25 0935    aspirin (ECOTRIN LOW STRENGTH) EC tablet 81 mg  81 mg Oral Daily Christian Castro MD   81 mg at 06/04/25 0932    atorvastatin (LIPITOR) tablet 80 mg  80 mg Oral Daily With Dinner Christian Castro MD   80 mg at 06/03/25  1613    carvedilol (COREG) tablet 6.25 mg  6.25 mg Oral BID With Meals Christian Castro MD   6.25 mg at 06/04/25 0930    enoxaparin (LOVENOX) subcutaneous injection 40 mg  40 mg Subcutaneous Daily Christian Castro MD   40 mg at 06/04/25 0932    hydrALAZINE (APRESOLINE) injection 10 mg  10 mg Intravenous Q6H PRN Christian Castro MD        melatonin tablet 3 mg  3 mg Oral HS Alethea Roldan PA-C   3 mg at 06/04/25 0222    nicotine polacrilex (NICORETTE) gum 2 mg  2 mg Oral Q2H PRN Alethea Roldan PA-C   2 mg at 06/04/25 0222    ondansetron (ZOFRAN) injection 4 mg  4 mg Intravenous Q6H PRN Christian Castro MD        potassium-sodium phosphates (PHOS-NAK) packet 1 packet  1 packet Oral BID With Meals Alethea Roldan PA-C   1 packet at 06/04/25 0936

## 2025-06-04 NOTE — ASSESSMENT & PLAN NOTE
Patient with altered mental status and abnormal behavior at home  CT head showed no acute abnormality.  Chronic small right basal ganglia, right corona radiata and right occipital infarcts.  CTA head and neck showed no acute vascular pathology  Chest x-rays unremarkable  UA is unremarkable  Urine drug screen positive for THC  Neurology consult  MRI brain without contrast - See below  Neurochecks per protocol  Continue on aspirin, statin
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
## 2025-06-04 NOTE — ASSESSMENT & PLAN NOTE
--Recommend Depakote 125 mg every 12 hours  --No indication for behavioral health treatment at this time, no mood or psychotic disorder criteria nor inpatient psychiatric hospitalization criteria

## 2025-06-04 NOTE — PLAN OF CARE
Problem: PAIN - ADULT  Goal: Verbalizes/displays adequate comfort level or baseline comfort level  Description: Interventions:  - Encourage patient to monitor pain and request assistance  - Assess pain using appropriate pain scale  - Administer analgesics as ordered based on type and severity of pain and evaluate response  - Implement non-pharmacological measures as appropriate and evaluate response  - Consider cultural and social influences on pain and pain management  - Notify physician/advanced practitioner if interventions unsuccessful or patient reports new pain  - Educate patient/family on pain management process including their role and importance of  reporting pain   - Provide non-pharmacologic/complimentary pain relief interventions  Outcome: Progressing     Problem: DISCHARGE PLANNING  Goal: Discharge to home or other facility with appropriate resources  Description: INTERVENTIONS:  - Identify barriers to discharge w/patient and caregiver  - Arrange for needed discharge resources and transportation as appropriate  - Identify discharge learning needs (meds, wound care, etc.)  - Arrange for interpretive services to assist at discharge as needed  - Refer to Case Management Department for coordinating discharge planning if the patient needs post-hospital services based on physician/advanced practitioner order or complex needs related to functional status, cognitive ability, or social support system  Outcome: Progressing     Problem: Knowledge Deficit  Goal: Patient/family/caregiver demonstrates understanding of disease process, treatment plan, medications, and discharge instructions  Description: Complete learning assessment and assess knowledge base.  Interventions:  - Provide teaching at level of understanding  - Provide teaching via preferred learning methods  Outcome: Progressing     Problem: Neurological Deficit  Goal: Neurological status is stable or improving  Description: Interventions:  - Monitor  and assess patient's level of consciousness, motor function, sensory function, and level of assistance needed for ADLs.   - Monitor and report changes from baseline. Collaborate with interdisciplinary team to initiate plan and implement interventions as ordered.   - Provide and maintain a safe environment.  - Consider seizure precautions.  - Consider fall precautions.  - Consider aspiration precautions.  - Consider bleeding precautions.  Outcome: Progressing     Problem: Communication Impairment  Goal: Ability to express needs and understand communication  Description: Assess patient's communication skills and ability to understand information.  Patient will demonstrate use of effective communication techniques, alternative methods of communication and understanding even if not able to speak.     - Encourage communication and provide alternate methods of communication as needed.  - Collaborate with case management/ for discharge needs.  - Include patient/family/caregiver in decisions related to communication.  Outcome: Progressing     Problem: Potential for Aspiration  Goal: Non-ventilated patient's risk of aspiration is minimized  Description: Assess and monitor vital signs, respiratory status, and labs (WBC).  Monitor for signs of aspiration (tachypnea, cough, rales, wheezing, cyanosis, fever).    - Assess and monitor patient's ability to swallow.  - Place patient up in chair to eat if possible.  - HOB up at 90 degrees to eat if unable to get patient up into chair.  - Supervise patient during oral intake.   - Instruct patient/ family to take small bites.  - Instruct patient/ family to take small single sips when taking liquids.  - Follow patient-specific strategies generated by speech pathologist.  Outcome: Progressing     Problem: Potential for Aspiration  Goal: Ventilated patient's risk of aspiration is minimized  Description: Assess and monitor vital signs, respiratory status, airway cuff  pressure, and labs (WBC).  Monitor for signs of aspiration (tachypnea, cough, rales, wheezing, cyanosis, fever).    - Elevate head of bed 30 degrees if patient has tube feeding.  - Monitor tube feeding.  Outcome: Progressing     Problem: Nutrition  Goal: Nutrition/Hydration status is improving  Description: Monitor and assess patient's nutrition/hydration status for malnutrition (ex- brittle hair, bruises, dry skin, pale skin and conjunctiva, muscle wasting, smooth red tongue, and disorientation). Collaborate with interdisciplinary team and initiate plan and interventions as ordered.  Monitor patient's weight and dietary intake as ordered or per policy. Utilize nutrition screening tool and intervene per policy. Determine patient's food preferences and provide high-protein, high-caloric foods as appropriate.     - Assist patient with eating.  - Allow adequate time for meals.  - Encourage patient to take dietary supplement as ordered.  - Collaborate with clinical nutritionist.  - Include patient/family/caregiver in decisions related to nutrition.  Outcome: Progressing

## 2025-06-04 NOTE — NURSING NOTE
"Patient keeps getting out of bed when bed alarm on. Patient is alert & oriented to person, place, time, & situation. He states, \" I can't sleep with all the noising around me\". Len has been pulling plugs out of the walls & to \"decrease the noise & light from the room\". He signed the refusal of fall precaution form. Patient was educated that if he continues vandalizing the room, we will have to discuss consequences. \"I'm in a right state of mind, I know I had a stroke & it's not my first one. I just want to leave\". Patient was then reeducated on how it is 1 am & cannot leave until tomorrow morning. Patient verbalized understanding. Oncoming Provider & RN will be notified of events.   "

## 2025-06-04 NOTE — NURSING NOTE
Post MRI, patient has been refusing to wear Telemetry monitor. Patient was educated on the need to keep it on but, still refused. On-call Provider will be notified.
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
## 2025-06-04 NOTE — ASSESSMENT & PLAN NOTE
Patient is noncompliant with his home medications per family  Patient is supposed to be on Coreg, Norvasc and hydrochlorothiazide  On Coreg 6.25 mg twice daily and Norvasc 10 mg daily  Hydralazine as needed

## 2025-06-04 NOTE — PHYSICAL THERAPY NOTE
"                                                                                  PHYSICAL THERAPY SCREEN NOTE      Patient Name: Vlad Gregorio  Today's Date: 6/4/2025 06/04/25 0871   Note Type   Note type Screen   Additional Comments PT orders received, chart review performed. Spoke w/ RN [place], per RN , patient has been \"...walking around his room all night. He signed the fall precaution form...\" Pt w/ baseline cognitive impairments per family. AMPAC of 20 and independently mobilizing around room. Pt w/ no acute PT needs, will DC PT       Yoana Lisa, PT, DPT   "

## 2025-06-04 NOTE — OCCUPATIONAL THERAPY NOTE
Occupational Therapy Screen Note        Patient Name: Vlad Gregorio  Today's Date: 6/4/2025 06/04/25 1443   OT Last Visit   OT Visit Date 06/04/25   Note Type   Note type Screen   Additional Comments OT orders received and chart review completed. Per medical rounds and confirmed LAUREL Hi, pt has signed fall risk form and has been ambulating in room. Pt with no acute OT neeeds at this time. Will DC OT       Sahra Carlos, OTR/L
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
## 2025-06-04 NOTE — ASSESSMENT & PLAN NOTE
"Patient placed on stroke pathway  MRI brain wo contrast 6/3/25:   \"Acute small infarcts in right parietal lobe. Multifocal chronic infarcts with severe chronic microangiopathy\"  Lipid panel, TSH and hemoglobin A1c  Neurology consult appreciated  Patient is nonadherent with his medications  Neurochecks per protocol  PT/OT/ST  Patient is recommended by neurology for anticoagulation.  Will discuss with patient's family  Continue on aspirin and statin  Outpatient ZIO versus loop recorder  "

## 2025-06-04 NOTE — NURSING NOTE
"Patient ripped out two IV's & is refusing for staff to put new one in. States, \" It is not necessary\". Tried educating patient; however, still refused. On call provider notified.   "

## 2025-06-04 NOTE — PLAN OF CARE
Problem: PAIN - ADULT  Goal: Verbalizes/displays adequate comfort level or baseline comfort level  Description: Interventions:  - Encourage patient to monitor pain and request assistance  - Assess pain using appropriate pain scale  - Administer analgesics as ordered based on type and severity of pain and evaluate response  - Implement non-pharmacological measures as appropriate and evaluate response  - Consider cultural and social influences on pain and pain management  - Notify physician/advanced practitioner if interventions unsuccessful or patient reports new pain  - Educate patient/family on pain management process including their role and importance of  reporting pain   - Provide non-pharmacologic/complimentary pain relief interventions  6/4/2025 1557 by Kolton Prieto RN  Outcome: Progressing  6/4/2025 1144 by Kolton Prieto RN  Outcome: Progressing     Problem: DISCHARGE PLANNING  Goal: Discharge to home or other facility with appropriate resources  Description: INTERVENTIONS:  - Identify barriers to discharge w/patient and caregiver  - Arrange for needed discharge resources and transportation as appropriate  - Identify discharge learning needs (meds, wound care, etc.)  - Arrange for interpretive services to assist at discharge as needed  - Refer to Case Management Department for coordinating discharge planning if the patient needs post-hospital services based on physician/advanced practitioner order or complex needs related to functional status, cognitive ability, or social support system  6/4/2025 1557 by Kolton Prieto RN  Outcome: Progressing  6/4/2025 1144 by Kolton Prieto RN  Outcome: Progressing     Problem: Knowledge Deficit  Goal: Patient/family/caregiver demonstrates understanding of disease process, treatment plan, medications, and discharge instructions  Description: Complete learning assessment and assess knowledge base.  Interventions:  - Provide teaching at level of understanding  -  Provide teaching via preferred learning methods  6/4/2025 1557 by Kolton Prieto RN  Outcome: Progressing  6/4/2025 1144 by Kolton Prieto RN  Outcome: Progressing     Problem: Neurological Deficit  Goal: Neurological status is stable or improving  Description: Interventions:  - Monitor and assess patient's level of consciousness, motor function, sensory function, and level of assistance needed for ADLs.   - Monitor and report changes from baseline. Collaborate with interdisciplinary team to initiate plan and implement interventions as ordered.   - Provide and maintain a safe environment.  - Consider seizure precautions.  - Consider fall precautions.  - Consider aspiration precautions.  - Consider bleeding precautions.  6/4/2025 1557 by Kolton Prieto RN  Outcome: Progressing  6/4/2025 1144 by Kolton Prieto RN  Outcome: Progressing     Problem: Activity Intolerance/Impaired Mobility  Goal: Mobility/activity is maintained at optimum level for patient  Description: Interventions:  - Assess and monitor patient  barriers to mobility and need for assistive/adaptive devices.  - Assess patient's emotional response to limitations.  - Collaborate with interdisciplinary team and initiate plans and interventions as ordered.  - Encourage independent activity per ability.  - Maintain proper body alignment.  - Perform active/passive rom as tolerated/ordered.  - Plan activities to conserve energy.  - Turn patient as appropriate  6/4/2025 1557 by Kolton Prieto RN  Outcome: Progressing  6/4/2025 1144 by Kolton Prieto RN  Outcome: Progressing     Problem: Nutrition  Goal: Nutrition/Hydration status is improving  Description: Monitor and assess patient's nutrition/hydration status for malnutrition (ex- brittle hair, bruises, dry skin, pale skin and conjunctiva, muscle wasting, smooth red tongue, and disorientation). Collaborate with interdisciplinary team and initiate plan and interventions as ordered.  Monitor patient's  weight and dietary intake as ordered or per policy. Utilize nutrition screening tool and intervene per policy. Determine patient's food preferences and provide high-protein, high-caloric foods as appropriate.     - Assist patient with eating.  - Allow adequate time for meals.  - Encourage patient to take dietary supplement as ordered.  - Collaborate with clinical nutritionist.  - Include patient/family/caregiver in decisions related to nutrition.  6/4/2025 1557 by Kolton Prieto RN  Outcome: Progressing  6/4/2025 1144 by Kolton Prieto RN  Outcome: Progressing

## 2025-06-04 NOTE — ASSESSMENT & PLAN NOTE
"64 y.o.  male with prior embolic appearing strokes (10/2024), tobacco use, prior alcohol abuse, prior TIA (2023), history of colon cancer, who presented to [facility] on 6/3/25 with AMS.     Workup  - CTA H/N wwo contrast 6/3/25:   \"CT Brain: No acute intracranial abnormality. Chronic and nonemergent findings above.  CT Angiography: No acute vascular pathology in the head or neck. Unchanged mild right right internal carotid and moderate to marked right posterior cerebral artery stenoses.\"  - MRI brain wo contrast 6/3/25:   \"Acute small infarcts in right parietal lobe. Multifocal chronic infarcts with severe chronic microangiopathy\"  - ELICEO 11/2024: EF 55%, no PFO, L atrium normal in size (procedure aborted early due to hypoxia from respiratory distress)  - Labs:  -UDS positive for THC, otherwise negative  -Ammonia: 18  - Ethanol <10  - lipid panel: total cholesterol 114, LDL 64  - hemoglobin A1c  pending   - TSH 0.875    ESUS, second event. Unsure that these punctate R parietal cortical infarcts caused AMS but pt with stroke on imaging and will treat.     Plan  - Stroke pathway  MRI brain completed, see above   No need for Echo as pt had ELICEO in 11/2024  Loop recorder vs Zio Patch as outpatient   With this being his second ESUS event, recommend anticoagulation (preferably Xarelto as this is dosed once a day and pt with history of medication noncompliance; however, ultimately defer to primary team). NO need for antiplatelet in addition to anticoagulation from neurologic perspective.   Continue home Atorvastatin 80 mg  Goal normotension   Euglycemic, normothermic goal  Continue telemetry  PT/OT/ST  Stroke education  Frequent neuro checks. Continue to monitor and notify neurology with any changes.  STAT CT head for any acute change in neuro exam  - Medical management and supportive care per primary team. Correction of any metabolic or infectious disturbances.               "
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
## 2025-06-04 NOTE — TELEPHONE ENCOUNTER
STILL ADMITTED:6/3/2025 - present (1 day)  [facility]      1ST ATTEMPT,     VIA [facility]     Thank you,     Daniela WARNER/ [facility]/ [facility]         LOV WITH , FOR CVA, Mild cognitive impairment  ON 2/4/2025    ----- Message from Kirsten Kowalski PA-C sent at 6/4/2025  7:25 AM EDT -----  Vlad Gregorio will need follow-up in in 6 weeks with neurovascular team for Other in 30 minute appointment. They will not require outpatient neurological testing.

## 2025-06-04 NOTE — QUICK NOTE
MRI brain wo contrast   Acute small infarcts in right parietal lobe.  Multifocal chronic infarcts with severe chronic microangiopathy, as detailed above.    Neuro following   Continue neuro checks  Continue aspirin   Continue tele

## 2025-06-05 VITALS
DIASTOLIC BLOOD PRESSURE: 82 MMHG | TEMPERATURE: 98.2 F | SYSTOLIC BLOOD PRESSURE: 161 MMHG | WEIGHT: 199.08 LBS | HEART RATE: 67 BPM | BODY MASS INDEX: 31.99 KG/M2 | OXYGEN SATURATION: 98 % | HEIGHT: 66 IN | RESPIRATION RATE: 20 BRPM

## 2025-06-05 LAB
ANION GAP SERPL CALCULATED.3IONS-SCNC: 7 MMOL/L (ref 4–13)
BASOPHILS # BLD AUTO: 0.04 THOUSANDS/ÂΜL (ref 0–0.1)
BASOPHILS NFR BLD AUTO: 1 % (ref 0–1)
BUN SERPL-MCNC: 16 MG/DL (ref 5–25)
CALCIUM SERPL-MCNC: 9 MG/DL (ref 8.4–10.2)
CHLORIDE SERPL-SCNC: 101 MMOL/L (ref 96–108)
CO2 SERPL-SCNC: 25 MMOL/L (ref 21–32)
CREAT SERPL-MCNC: 1.02 MG/DL (ref 0.6–1.3)
EOSINOPHIL # BLD AUTO: 0.06 THOUSAND/ÂΜL (ref 0–0.61)
EOSINOPHIL NFR BLD AUTO: 1 % (ref 0–6)
ERYTHROCYTE [DISTWIDTH] IN BLOOD BY AUTOMATED COUNT: 12.6 % (ref 11.6–15.1)
GFR SERPL CREATININE-BSD FRML MDRD: 77 ML/MIN/1.73SQ M
GLUCOSE SERPL-MCNC: 89 MG/DL (ref 65–140)
HCT VFR BLD AUTO: 35.9 % (ref 36.5–49.3)
HGB BLD-MCNC: 12.6 G/DL (ref 12–17)
IMM GRANULOCYTES # BLD AUTO: 0.01 THOUSAND/UL (ref 0–0.2)
IMM GRANULOCYTES NFR BLD AUTO: 0 % (ref 0–2)
LYMPHOCYTES # BLD AUTO: 0.73 THOUSANDS/ÂΜL (ref 0.6–4.47)
LYMPHOCYTES NFR BLD AUTO: 18 % (ref 14–44)
MCH RBC QN AUTO: 28.9 PG (ref 26.8–34.3)
MCHC RBC AUTO-ENTMCNC: 35.1 G/DL (ref 31.4–37.4)
MCV RBC AUTO: 82 FL (ref 82–98)
MONOCYTES # BLD AUTO: 0.46 THOUSAND/ÂΜL (ref 0.17–1.22)
MONOCYTES NFR BLD AUTO: 11 % (ref 4–12)
NEUTROPHILS # BLD AUTO: 2.86 THOUSANDS/ÂΜL (ref 1.85–7.62)
NEUTS SEG NFR BLD AUTO: 69 % (ref 43–75)
NRBC BLD AUTO-RTO: 0 /100 WBCS
PLATELET # BLD AUTO: 158 THOUSANDS/UL (ref 149–390)
PMV BLD AUTO: 9.6 FL (ref 8.9–12.7)
POTASSIUM SERPL-SCNC: 3.9 MMOL/L (ref 3.5–5.3)
RBC # BLD AUTO: 4.36 MILLION/UL (ref 3.88–5.62)
SODIUM SERPL-SCNC: 133 MMOL/L (ref 135–147)
WBC # BLD AUTO: 4.16 THOUSAND/UL (ref 4.31–10.16)

## 2025-06-05 PROCEDURE — 80048 BASIC METABOLIC PNL TOTAL CA: CPT | Performed by: INTERNAL MEDICINE

## 2025-06-05 PROCEDURE — 99239 HOSP IP/OBS DSCHRG MGMT >30: CPT | Performed by: INTERNAL MEDICINE

## 2025-06-05 PROCEDURE — 85025 COMPLETE CBC W/AUTO DIFF WBC: CPT | Performed by: INTERNAL MEDICINE

## 2025-06-05 RX ORDER — AMLODIPINE BESYLATE 10 MG/1
10 TABLET ORAL DAILY
Qty: 30 TABLET | Refills: 0 | Status: SHIPPED | OUTPATIENT
Start: 2025-06-06 | End: 2025-07-06

## 2025-06-05 RX ORDER — CARVEDILOL 6.25 MG/1
6.25 TABLET ORAL 2 TIMES DAILY WITH MEALS
Qty: 60 TABLET | Refills: 0 | Status: SHIPPED | OUTPATIENT
Start: 2025-06-05 | End: 2025-07-05

## 2025-06-05 RX ORDER — LORAZEPAM 0.5 MG/1
0.5 TABLET ORAL ONCE
Status: COMPLETED | OUTPATIENT
Start: 2025-06-05 | End: 2025-06-05

## 2025-06-05 RX ORDER — ATORVASTATIN CALCIUM 80 MG/1
80 TABLET, FILM COATED ORAL
Qty: 30 TABLET | Refills: 0 | Status: SHIPPED | OUTPATIENT
Start: 2025-06-05 | End: 2025-07-05

## 2025-06-05 RX ORDER — DIVALPROEX SODIUM 125 MG/1
125 TABLET, DELAYED RELEASE ORAL EVERY 12 HOURS SCHEDULED
Qty: 60 TABLET | Refills: 0 | Status: SHIPPED | OUTPATIENT
Start: 2025-06-05 | End: 2025-07-05

## 2025-06-05 RX ORDER — ASPIRIN 81 MG/1
81 TABLET, CHEWABLE ORAL DAILY
Qty: 30 TABLET | Refills: 0 | Status: SHIPPED | OUTPATIENT
Start: 2025-06-05

## 2025-06-05 RX ADMIN — AMLODIPINE BESYLATE 10 MG: 5 TABLET ORAL at 08:48

## 2025-06-05 RX ADMIN — LORAZEPAM 0.5 MG: 0.5 TABLET ORAL at 02:32

## 2025-06-05 RX ADMIN — ASPIRIN 81 MG: 81 TABLET, COATED ORAL at 08:47

## 2025-06-05 RX ADMIN — ENOXAPARIN SODIUM 40 MG: 100 INJECTION SUBCUTANEOUS at 08:47

## 2025-06-05 RX ADMIN — CARVEDILOL 6.25 MG: 3.12 TABLET, FILM COATED ORAL at 08:47

## 2025-06-05 RX ADMIN — DIVALPROEX SODIUM 125 MG: 125 TABLET, DELAYED RELEASE ORAL at 08:47

## 2025-06-05 NOTE — CASE MANAGEMENT
Case Management Progress Note    Patient name Vlad Gregorio  Location /-01 MRN [id number]  : 1960 Date 2025       LOS (days): 2  Geometric Mean LOS (GMLOS) (days):   Days to GMLOS:        OBJECTIVE:        Current admission status: Inpatient  Preferred Pharmacy:   UNKNOWN - FOLLOW UP PRIOR TO DISCHARGE TO E-PRESCRIBE  No address on file      [facility] - [place], [place] - [address]  [address]  [place] [address]  Phone: [phone] Fax: [phone]    [facility] [place] [place] - [address]  [address] [address]  Phone: [phone] Fax: [phone]    Primary Care Provider: Arthur Watts MD    Primary Insurance: [employer]IN PENDING  Secondary Insurance:     PROGRESS NOTE:    Notification made to OP CM Handoff: [facility] OP CM regarding discharge planning and disposition.   Spoke with Rhonda STRICKLAND

## 2025-06-05 NOTE — ASSESSMENT & PLAN NOTE
Patient with altered mental status and abnormal behavior at home  CT head showed no acute abnormality.  Chronic small right basal ganglia, right corona radiata and right occipital infarcts.  CTA head and neck showed no acute vascular pathology  Chest x-rays unremarkable  UA is unremarkable  Urine drug screen positive for THC  Neurology consult  MRI brain without contrast - See below  Neurochecks per protocol  Continue on aspirin, statin  Patient will be discharged on Xarelto.  30-day coupon was given by case management  Outpatient follow-up with neurology  Discussed with son and daughter who are in agreement with the discharge plan
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
## 2025-06-05 NOTE — NURSING NOTE
Patient Vlad having increased confusion, AOx2, disoriented to time and place.  Wandering, confusion, hearing tractor noises, incontinent episode.  Patient previously signed fall precautions refusal form.  RN noted change of mental status, SLIM provider notified.  RN overriding form and now having bed alarm on.   RN will re-evaluate patient mental status throughout night and will advise oncoming nurse in morning.    Thank you.

## 2025-06-05 NOTE — CASE MANAGEMENT
Case Management Progress Note    Patient name Vlad Gregorio  Location /-01 MRN [id number]  : 1960 Date 2025       LOS (days): 2  Geometric Mean LOS (GMLOS) (days):   Days to GMLOS:        OBJECTIVE:        Current admission status: Inpatient  Preferred Pharmacy:   UNKNOWN - FOLLOW UP PRIOR TO DISCHARGE TO E-PRESCRIBE  No address on file      [facility] - [place], [place] - [address]  [address]  [place] [address]  Phone: [phone] Fax: [phone]    [facility] [place] [place] - [address]  [address] [facility] [address]  Phone: [phone] Fax: [phone]    Primary Care Provider: Arthur Watts MD    Primary Insurance: ELVIN COLIN PENDING  Secondary Insurance:     PROGRESS NOTE:    Cm was informed that pt is medically stable for dc today. Pt's son to transport home.   [facility] Castro[facility] Pt does not have insurance or prescription coverage. Dr. Castro requested a 30 day free trial card be provided for patient to receive 1st month supply free. Coupon provided to pt's bedside LAUREL Tobar to provide to pt's son at [place].

## 2025-06-05 NOTE — PLAN OF CARE
Problem: Potential for Falls  Goal: Patient will remain free of falls  Description: INTERVENTIONS:  - Educate patient/family on patient safety including physical limitations  - Instruct patient to call for assistance with activity   - Consider consulting OT/PT to assist with strengthening/mobility based on AM PAC & JH-HLM score  - Consult OT/PT to assist with strengthening/mobility   - Keep Call bell within reach  - Keep bed low and locked with side rails adjusted as appropriate  - Keep care items and personal belongings within reach  - Initiate and maintain comfort rounds  - Make Fall Risk Sign visible to staff  - Offer Toileting every 2 Hours, in advance of need  - Obtain necessary fall risk management equipment: yellow socks, walker for ambulation as needed  - Apply yellow socks and bracelet for high fall risk patients  - Consider moving patient to room near nurses station  Outcome: Progressing     Problem: PAIN - ADULT  Goal: Verbalizes/displays adequate comfort level or baseline comfort level  Description: Interventions:  - Encourage patient to monitor pain and request assistance  - Assess pain using appropriate pain scale  - Administer analgesics as ordered based on type and severity of pain and evaluate response  - Implement non-pharmacological measures as appropriate and evaluate response  - Consider cultural and social influences on pain and pain management  - Notify physician/advanced practitioner if interventions unsuccessful or patient reports new pain  - Educate patient/family on pain management process including their role and importance of  reporting pain   - Provide non-pharmacologic/complimentary pain relief interventions  Outcome: Progressing     Problem: DISCHARGE PLANNING  Goal: Discharge to home or other facility with appropriate resources  Description: INTERVENTIONS:  - Identify barriers to discharge w/patient and caregiver  - Arrange for needed discharge resources and transportation as  appropriate  - Identify discharge learning needs (meds, wound care, etc.)  - Arrange for interpretive services to assist at discharge as needed  - Refer to Case Management Department for coordinating discharge planning if the patient needs post-hospital services based on physician/advanced practitioner order or complex needs related to functional status, cognitive ability, or social support system  Outcome: Progressing     Problem: Knowledge Deficit  Goal: Patient/family/caregiver demonstrates understanding of disease process, treatment plan, medications, and discharge instructions  Description: Complete learning assessment and assess knowledge base.  Interventions:  - Provide teaching at level of understanding  - Provide teaching via preferred learning methods  Outcome: Progressing     Problem: Neurological Deficit  Goal: Neurological status is stable or improving  Description: Interventions:  - Monitor and assess patient's level of consciousness, motor function, sensory function, and level of assistance needed for ADLs.   - Monitor and report changes from baseline. Collaborate with interdisciplinary team to initiate plan and implement interventions as ordered.   - Provide and maintain a safe environment.  - Consider seizure precautions.  - Consider fall precautions.  - Consider aspiration precautions.  - Consider bleeding precautions.  Outcome: Not Progressing     Problem: Activity Intolerance/Impaired Mobility  Goal: Mobility/activity is maintained at optimum level for patient  Description: Interventions:  - Assess and monitor patient  barriers to mobility and need for assistive/adaptive devices.  - Assess patient's emotional response to limitations.  - Collaborate with interdisciplinary team and initiate plans and interventions as ordered.  - Encourage independent activity per ability.  - Maintain proper body alignment.  - Perform active/passive rom as tolerated/ordered.  - Plan activities to conserve energy.  -  Turn patient as appropriate  Outcome: Progressing     Problem: Communication Impairment  Goal: Ability to express needs and understand communication  Description: Assess patient's communication skills and ability to understand information.  Patient will demonstrate use of effective communication techniques, alternative methods of communication and understanding even if not able to speak.     - Encourage communication and provide alternate methods of communication as needed.  - Collaborate with case management/ for discharge needs.  - Include patient/family/caregiver in decisions related to communication.  Outcome: Progressing     Problem: Potential for Aspiration  Goal: Non-ventilated patient's risk of aspiration is minimized  Description: Assess and monitor vital signs, respiratory status, and labs (WBC).  Monitor for signs of aspiration (tachypnea, cough, rales, wheezing, cyanosis, fever).    - Assess and monitor patient's ability to swallow.  - Place patient up in chair to eat if possible.  - HOB up at 90 degrees to eat if unable to get patient up into chair.  - Supervise patient during oral intake.   - Instruct patient/ family to take small bites.  - Instruct patient/ family to take small single sips when taking liquids.  - Follow patient-specific strategies generated by speech pathologist.  Outcome: Progressing  Goal: Ventilated patient's risk of aspiration is minimized  Description: Assess and monitor vital signs, respiratory status, airway cuff pressure, and labs (WBC).  Monitor for signs of aspiration (tachypnea, cough, rales, wheezing, cyanosis, fever).    - Elevate head of bed 30 degrees if patient has tube feeding.  - Monitor tube feeding.  Outcome: Progressing     Problem: Nutrition  Goal: Nutrition/Hydration status is improving  Description: Monitor and assess patient's nutrition/hydration status for malnutrition (ex- brittle hair, bruises, dry skin, pale skin and conjunctiva, muscle  wasting, smooth red tongue, and disorientation). Collaborate with interdisciplinary team and initiate plan and interventions as ordered.  Monitor patient's weight and dietary intake as ordered or per policy. Utilize nutrition screening tool and intervene per policy. Determine patient's food preferences and provide high-protein, high-caloric foods as appropriate.     - Assist patient with eating.  - Allow adequate time for meals.  - Encourage patient to take dietary supplement as ordered.  - Collaborate with clinical nutritionist.  - Include patient/family/caregiver in decisions related to nutrition.  Outcome: Progressing

## 2025-06-05 NOTE — DISCHARGE INSTR - AVS FIRST PAGE
Follow up with PCP, neurology and cardiology  Outpatient Zio patch or holter monitor with cardiology

## 2025-06-05 NOTE — ASSESSMENT & PLAN NOTE
Patient is noncompliant with his home medications per family  Patient is supposed to be on Coreg, Norvasc and hydrochlorothiazide  On Coreg 6.25 mg twice daily and Norvasc 10 mg daily  Hydralazine as needed  Outpatient follow-up with PCP

## 2025-06-05 NOTE — ASSESSMENT & PLAN NOTE
"Patient placed on stroke pathway  MRI brain wo contrast 6/3/25:   \"Acute small infarcts in right parietal lobe. Multifocal chronic infarcts with severe chronic microangiopathy\"  Lipid panel, TSH and hemoglobin A1c reviewed  Neurology consult appreciated  Patient is nonadherent with his medications  Neurochecks per protocol  PT/OT/ST  Patient is recommended by neurology for anticoagulation.  Will discuss with patient's family  Continue on aspirin and statin  Outpatient ZIO versus loop recorder  "